# Patient Record
Sex: MALE | Race: WHITE | NOT HISPANIC OR LATINO | Employment: FULL TIME | URBAN - METROPOLITAN AREA
[De-identification: names, ages, dates, MRNs, and addresses within clinical notes are randomized per-mention and may not be internally consistent; named-entity substitution may affect disease eponyms.]

---

## 2017-02-16 ENCOUNTER — OFFICE VISIT (OUTPATIENT)
Dept: FAMILY MEDICINE | Facility: CLINIC | Age: 47
End: 2017-02-16

## 2017-02-16 VITALS
SYSTOLIC BLOOD PRESSURE: 130 MMHG | TEMPERATURE: 97.9 F | DIASTOLIC BLOOD PRESSURE: 81 MMHG | OXYGEN SATURATION: 97 % | WEIGHT: 195 LBS | HEART RATE: 58 BPM | BODY MASS INDEX: 28.18 KG/M2 | RESPIRATION RATE: 20 BRPM

## 2017-02-16 DIAGNOSIS — R05.9 COUGH: Primary | ICD-10-CM

## 2017-02-16 RX ORDER — PREDNISONE 20 MG/1
TABLET ORAL
Qty: 8 TABLET | Refills: 0 | Status: SHIPPED | OUTPATIENT
Start: 2017-02-16 | End: 2017-03-01

## 2017-02-16 NOTE — MR AVS SNAPSHOT
After Visit Summary   2/16/2017    Natalie Ny    MRN: [id number]           Patient Information     Date Of Birth          1970        Visit Information        Provider Department      2/16/2017 4:35 PM Jed Knox MD [facility]        Today's Diagnoses     Cough    -  1       Follow-ups after your visit        Who to contact     Please call your clinic at [phone] to:    Ask questions about your health    Make or cancel appointments    Discuss your medicines    Learn about your test results    Speak to your doctor   If you have compliments or concerns about an experience at your clinic, or if you wish to file a complaint, please contact [facility] Patient Relations at [phone] or email us at [email or web address]         Additional Information About Your Visit        MyChart Information     D[employer] gives you secure access to your electronic health record. If you see a primary care provider, you can also send messages to your care team and make appointments. If you have questions, please call your primary care clinic.  If you do not have a primary care provider, please call [phone] and they will assist you.      Poderopedia is an electronic gateway that provides easy, online access to your medical records. With P[employer], you can request a clinic appointment, read your test results, renew a prescription or communicate with your care team.     To access your existing account, please contact your [facility] or call [phone] for assistance.        Care EveryWhere ID     This is your Care EveryWhere ID. This could be used by other organizations to access your [place] medical records  [id number]        Your Vitals Were     Pulse Temperature Respirations Pulse Oximetry BMI (Body Mass Index)       58 97.9  F (36.6  C) (Oral) 20 97% 28.18 kg/m2        Blood Pressure from Last 3 Encounters:    02/16/17 130/81   10/25/16 135/81   10/19/16 147/84    Weight from Last 3 Encounters:   02/16/17 88.5 kg (195 lb)   10/25/16 89.8 kg (198 lb)   10/19/16 88.5 kg (195 lb)              Today, you had the following     No orders found for display         Today's Medication Changes          These changes are accurate as of: 2/16/17  5:03 PM.  If you have any questions, ask your nurse or doctor.               Start taking these medicines.        Dose/Directions    predniSONE 20 MG tablet   Commonly known as:  DELTASONE   Used for:  Cough        Take 2 po qd   Quantity:  8 tablet   Refills:  0         These medicines have changed or have updated prescriptions.        Dose/Directions    nadolol 20 MG tablet   Commonly known as:  CORGARD   This may have changed:    - when to take this  - reasons to take this   Used for:  Syncope        Dose:  20 mg   Take 1 tablet by mouth daily.   Quantity:  93 tablet   Refills:  3            Where to get your medicines      These medications were sent to [facility] [address] [place] [address] AT [address] [place] [id number]    Hours:  24-hours Phone:  [phone]     predniSONE 20 MG tablet                Primary Care Provider Office Phone # Fax #    Jed Knox [phone]       [facility] [address] [place] [address]        Thank you!     Thank you for choosing [facility]  for your care. Our goal is always to provide you with excellent care. Hearing back from our patients is one way we can continue to improve our services. Please take a few minutes to complete the written survey that you may receive in the mail after your visit with us. Thank you!             Your Updated Medication List - Protect others around you: Learn how to safely use, store and throw away your medicines at www.disposemymeds.org.          This list is accurate as of: 2/16/17   5:03 PM.  Always use your most recent med list.                   Brand Name Dispense Instructions for use    albuterol 108 (90 BASE) MCG/ACT Inhaler    PROAIR HFA/PROVENTIL HFA/VENTOLIN HFA    1 Inhaler    Inhale 2 puffs into the lungs every 6 hours as needed for shortness of breath / dyspnea or wheezing       finasteride 1 MG tablet    PROPECIA    90 tablet    Take 1 tablet (1 mg) by mouth daily       levothyroxine 200 MCG tablet    SYNTHROID/LEVOTHROID    90 tablet    Take 1 tablet daily       nadolol 20 MG tablet    CORGARD    93 tablet    Take 1 tablet by mouth daily.       predniSONE 20 MG tablet    DELTASONE    8 tablet    Take 2 po qd       ZANTAC 150 MG tablet   Generic drug:  ranitidine      Take 150 mg by mouth as needed.

## 2017-02-16 NOTE — NURSING NOTE
Chief Complaint   Patient presents with     Cough     Pt states he has had a cough for 3-4 weeks.      Cathryn Ochoa LPN February 16, 2017 4:40 PM

## 2017-02-16 NOTE — PROGRESS NOTES
SUBJECTIVE:    Pt is a 46 year old male with pmh of     Patient Active Problem List   Diagnosis     Grave's disease     Syncope and collapse       who is here for evaluation of had concerns including Cough.    Here for a cough.    About a mo ago in [place], had fever, runny nose, cough onset. Saw MD there, got Zpak times one, seemed to help.     No ear ache, no GI sx. At first fever and tired, those are gone.    Only remaining sx is cough. No SOB. Over the years, often takes a long time to stop coughing after virus.    Had dtap, flu shot both up to date; discussed could still get pertusis or flu lesser case, did take zpak.    Uses wifes proventil, helps partly/temporarily w/ cough.    Has coughed enough ribs hurt.    Allergies   Allergen Reactions     Amoxicillin Rash     Penicillins Rash           Current Outpatient Prescriptions   Medication Sig Dispense Refill     predniSONE (DELTASONE) 20 MG tablet Take 2 po qd 8 tablet 0     finasteride (PROPECIA) 1 MG tablet Take 1 tablet (1 mg) by mouth daily 90 tablet 3     levothyroxine (SYNTHROID, LEVOTHROID) 200 MCG tablet Take 1 tablet daily 90 tablet 1     albuterol (PROAIR HFA, PROVENTIL HFA, VENTOLIN HFA) 108 (90 BASE) MCG/ACT inhaler Inhale 2 puffs into the lungs every 6 hours as needed for shortness of breath / dyspnea or wheezing 1 Inhaler 0     nadolol (CORGARD) 20 MG tablet Take 1 tablet by mouth daily. (Patient taking differently: Take 20 mg by mouth as needed ) 93 tablet 3     ranitidine (ZANTAC) 150 MG tablet Take 150 mg by mouth as needed.         Social History   Substance Use Topics     Smoking status: Never Smoker     Smokeless tobacco: Never Used     Alcohol use Not on file         OBJECTIVE:  /81  Pulse 58  Temp 97.9  F (36.6  C) (Oral)  Resp 20  Wt 88.5 kg (195 lb)  SpO2 97%  BMI 28.18 kg/m2  GENERAL APPEARANCE: Alert, no acute distress  EYES: PERRL, EOM normal, conjunctiva and lids normal  HENT: Ears and TMs normal, oral mucosa and posterior  oropharynx normal  NECK: No adenopathy,masses or thyromegaly  RESP: lungs clear to auscultation   NEURO: Alert, oriented, speech and mentation normal  PSYCHE: mentation appears normal, affect and mood normal    ASSESSMENT/PLAN:    Cough: post viral vs pertussis (took zpak)  Discussed labs, cxr antoine not helpful  Pred burst, f/u prn, cont prn proventil inhaler    KAITLYNN ABREU MD

## 2017-02-22 DIAGNOSIS — R55 SYNCOPE, UNSPECIFIED SYNCOPE TYPE: Primary | ICD-10-CM

## 2017-02-22 RX ORDER — NADOLOL 20 MG/1
20 TABLET ORAL DAILY
Qty: 93 TABLET | Refills: 3 | Status: SHIPPED | OUTPATIENT
Start: 2017-02-22 | End: 2018-05-14

## 2017-02-24 DIAGNOSIS — R05.9 COUGH: Primary | ICD-10-CM

## 2017-03-01 ENCOUNTER — OFFICE VISIT (OUTPATIENT)
Dept: PULMONOLOGY | Facility: CLINIC | Age: 47
End: 2017-03-01
Attending: INTERNAL MEDICINE
Payer: COMMERCIAL

## 2017-03-01 VITALS — OXYGEN SATURATION: 99 % | SYSTOLIC BLOOD PRESSURE: 138 MMHG | HEART RATE: 64 BPM | DIASTOLIC BLOOD PRESSURE: 75 MMHG

## 2017-03-01 DIAGNOSIS — E03.9 HYPOTHYROIDISM: ICD-10-CM

## 2017-03-01 DIAGNOSIS — R05.9 COUGH: Primary | ICD-10-CM

## 2017-03-01 DIAGNOSIS — R05.9 COUGH: ICD-10-CM

## 2017-03-01 PROCEDURE — 99212 OFFICE O/P EST SF 10 MIN: CPT | Mod: ZF

## 2017-03-01 RX ORDER — FLUTICASONE PROPIONATE 220 UG/1
2 AEROSOL, METERED RESPIRATORY (INHALATION) 2 TIMES DAILY
Qty: 1 INHALER | Refills: 1 | Status: SHIPPED | OUTPATIENT
Start: 2017-03-01 | End: 2017-03-24

## 2017-03-01 RX ORDER — LEVOTHYROXINE SODIUM 200 UG/1
TABLET ORAL
Qty: 90 TABLET | Refills: 2 | Status: SHIPPED | OUTPATIENT
Start: 2017-03-01 | End: 2018-02-15

## 2017-03-01 ASSESSMENT — PAIN SCALES - GENERAL: PAINLEVEL: NO PAIN (0)

## 2017-03-01 NOTE — TELEPHONE ENCOUNTER
levothyroxine  Last Written Prescription Date:  9/6/16  Last Fill Quantity: 90,   # refills: 1  Last Office Visit : 10/25/16  Future Office visit:  No future visit

## 2017-03-01 NOTE — PATIENT INSTRUCTIONS
1. Cough most likely reactive airway disease following pneumonia   2. Flovent HFA 2puffs twice a day for 2 weeks. If better, down to 1puff twice a day   3. F/u in general pulmonary clinic in 4-6weeks   4. CT chest without contrast to r/o other causes   5. Oral rinse before and after inhaler use

## 2017-03-01 NOTE — LETTER
3/1/2017       RE: Natalie Ny  [address]  [place] [address]     Dear Colleague,    Thank you for referring your patient, Natalie Ny, to the [facility] at [facility]. Please see a copy of my visit note below.    [facility]   PULMONARY OUTPATIENT CONSULT NOTE    Natalie Ny MRN# [id number]   Age: 46 year old YOB: 1970     Reason for Consultation:     Chronic cough  Requesting Physician:         Jed Knox MD  [facility]  [address] [place] [address] [address]  [place] [address]       Assessment and Plan:    1. Chronic cough - most likely tracheobronchitis/RAD following bout of PNA. CXR without any airspace opacity. PFT are normal. At this point, it appears his sx are improving, although gradually, and interfering with work. Plan is to prescribe steroid inhaler for RAD (flovent HFA 2p BID) for 2 weeks then taper to 1p BID if sx are improving. Additionally, we will get chest ct w/o contrast to r/o other etiologies for cough. Plan is to see back in clinic in 4-6 weeks to re-evaluate sx. He is planning to get the ct done this week and I will call him with my impression.     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 He saw urgent care and was given z-liam with modest improvement.   - Over the past 2 weeks, his cough is dry and exacerbated by talking. He denies fever or chills. He was prescribed 40mg prednisone for 5 days which had some improvement however sx returned a few days after.   - He denies recollection of aspiration, fever/chills, chest pain or dyspnea.            Past Medical  History:    No past medical history on file.          Past Surgical History:    No past surgical history on file.         Social History:     Social History   Substance Use Topics     Smoking status: Never Smoker     Smokeless tobacco: Never Used     Alcohol use Not on file            Family History:   No family history on file.          Allergies:   Please see allergy list which was reviewed this admission.         Medications:     .  Current Outpatient Prescriptions   Medication     fluticasone (FLOVENT HFA) 220 MCG/ACT Inhaler     albuterol (PROAIR HFA/PROVENTIL HFA/VENTOLIN HFA) 108 (90 BASE) MCG/ACT Inhaler     nadolol (CORGARD) 20 MG tablet     finasteride (PROPECIA) 1 MG tablet     [DISCONTINUED] levothyroxine (SYNTHROID, LEVOTHROID) 200 MCG tablet     ranitidine (ZANTAC) 150 MG tablet     levothyroxine (SYNTHROID/LEVOTHROID) 200 MCG tablet     No current facility-administered medications for this visit.           Review of Systems:   CONSTITUTIONAL: negative for fever, chills, change in weight  INTEGUMENTARY/SKIN: no rash or obvious new lesions  ENT/MOUTH: no sore throat, new sinus pain or nasal drainage  RESP: see interval history  CV: negative for chest pain, palpitations or peripheral edema  GI: no nausea, vomiting, change in stools  : no dysuria  MUSCULOSKELETAL: no myalgias, arthralgias  ENDOCRINE: blood sugars with adequate control  PSYCHIATRIC: mood stable  LYMPHATIC: no new lymphadenopathy  HEME: no bleeding or easy bruisability  NEURO: no numbness, weakness, headaches         Physical Exam:   Pulse:  [64] 64  BP: (138)/(75) 138/75  SpO2:  [99 %] 99 %    Wt Readings from Last 4 Encounters:   02/16/17 88.5 kg (195 lb)   10/25/16 89.8 kg (198 lb)   10/19/16 88.5 kg (195 lb)   03/03/16 86.8 kg (191 lb 4.8 oz)       Constitutional:   Awake, alert and in no apparent distress     Eyes:   Nonicteric, CHATO     ENT:    oral mucosa moist without lesions     Neck:   Supple without supraclavicular or cervical  lymphadenopathy     Lungs:   Good air flow.  No crackles. No rhonchi.  No wheezes.     Cardiovascular:   Normal S1 and S2.  RRR.  No murmur, gallop or rub.  Radial, DP and PT pulses normal and symmetric     Abdomen:   NABS, soft, nontender, nondistended.  No HSM.     Musculoskeletal:   No edema. Strength 5/5 and symmetric     Neurologic:   Alert and conversant. Cranial nerves II-XII intact.       Skin:   Warm, dry.  No rash on limited exam.           Current Laboratory Data:   All laboratory and imaging data reviewed.    Results for orders placed or performed in visit on 03/01/17 (from the past 24 hour(s))   PFT General Lab Testing   Result Value Ref Range    FVC-Pred 5.12 L    FVC-Pre 5.10 L    FVC-%Pred-Pre 99 %    FEV1-Pre 3.87 L    FEV1-%Pred-Pre 95 %    FEV1FVC-Pred 80 %    FEV1FVC-Pre 76 %    FEFMax-Pred 10.01 L/sec    FEFMax-Pre 12.29 L/sec    FEFMax-%Pred-Pre 122 %    FEF2575-Pred 3.83 L/sec    FEF2575-Pre 3.01 L/sec    RGM5564-%Pred-Pre 78 %    FEF2575-Post 4.13 L/sec    HPA7220-%Pred-Post 107 %    ExpTime-Pre 6.67 sec    FIFMax-Pre 9.14 L/sec    VC-Pred 5.12 L    VC-Pre 5.48 L    VC-%Pred-Pre 107 %    IC-Pred 3.95 L    IC-Pre 4.41 L    IC-%Pred-Pre 111 %    ERV-Pred 1.17 L    ERV-Pre 1.07 L    ERV-%Pred-Pre 91 %    FEV1FEV6-Pred 81 %    FEV1FEV6-Pre 76 %    FRCPleth-Pred 3.48 L    FRCPleth-Pre 3.82 L    FRCPleth-%Pred-Pre 109 %    RVPleth-Pred 2.10 L    RVPleth-Pre 2.75 L    RVPleth-%Pred-Pre 131 %    TLCPleth-Pred 7.13 L    TLCPleth-Pre 8.23 L    TLCPleth-%Pred-Pre 115 %    DLCOunc-Pred 31.74 ml/min/mmHg    DLCOunc-Pre 38.42 ml/min/mmHg    DLCOunc-%Pred-Pre 121 %    VA-Pre 7.24 L    VA-%Pred-Pre 106 %    FEV1SVC-Pred 79 %    FEV1SVC-Pre 71 %          Previous Pulmonary Function Testing     FVC-Pred   Date Value Ref Range Status   03/01/2017 5.12 L      FVC-Pre   Date Value Ref Range Status   03/01/2017 5.10 L      FVC-%Pred-Pre   Date Value Ref Range Status   03/01/2017 99 %      FEV1-Pre   Date Value Ref  Range Status   03/01/2017 3.87 L      FEV1-%Pred-Pre   Date Value Ref Range Status   03/01/2017 95 %      FEV1FVC-Pred   Date Value Ref Range Status   03/01/2017 80 %      FEV1FVC-Pre   Date Value Ref Range Status   03/01/2017 76 %      No results found for: 987[address]  FEFMax-Pred   Date Value Ref Range Status   03/01/2017 10.01 L/sec      FEFMax-Pre   Date Value Ref Range Status   03/01/2017 12.29 L/sec      FEFMax-%Pred-Pre   Date Value Ref Range Status   03/01/2017 122 %      ExpTime-Pre   Date Value Ref Range Status   03/01/2017 6.67 sec      FIFMax-Pre   Date Value Ref Range Status   03/01/2017 9.14 L/sec      FEV1FEV6-Pred   Date Value Ref Range Status   03/01/2017 81 %      FEV1FEV6-Pre   Date Value Ref Range Status   03/01/2017 76 %            Previous Chest Imaging   Exam: XR CHEST 2 VW, 2/24/2017 2:46 PM     Indication: Cough     Comparison: None     Findings:   PA and lateral view of the chest. No pleural effusion. No  pneumothorax. Cardiac silhouette and pulmonary vasculature is within  normal limits. Visualized osseous structures and upper abdomen are  unremarkable.         Impression: No acute airspace disease.     I have personally reviewed the examination and initial interpretation  and I agree with the findings.     SCOOBY ANN MD         Again, thank you for allowing me to participate in the care of your patient.      Sincerely,    Pulmonary Consult

## 2017-03-01 NOTE — MR AVS SNAPSHOT
After Visit Summary   3/1/2017    Natalie Ny    MRN: [id number]           Patient Information     Date Of Birth          1970        Visit Information        Provider Department      3/1/2017 2:00 PM [facility] PULMONARY CONSULT [facility]        Today's Diagnoses     Cough    -  1      Care Instructions    1. Cough most likely reactive airway disease following pneumonia   2. Flovent HFA 2puffs twice a day for 2 weeks. If better, down to 1puff twice a day   3. F/u in general pulmonary clinic in 4-6weeks   4. CT chest without contrast to r/o other causes   5. Oral rinse before and after inhaler use             Follow-ups after your visit        Future tests that were ordered for you today     Open Future Orders        Priority Expected Expires Ordered    CT Chest w/o Contrast Routine 3/1/2018 3/1/2019 3/1/2017            Who to contact     If you have questions or need follow up information about today's clinic visit or your schedule please contact [facility] [employer] directly at [phone].  Normal or non-critical lab and imaging results will be communicated to you by Playbooxhart, letter or phone within 4 business days after the clinic has received the results. If you do not hear from us within 7 days, please contact the clinic through [employer] or phone. If you have a critical or abnormal lab result, we will notify you by phone as soon as possible.  Submit refill requests through [employer] or call your pharmacy and they will forward the refill request to us. Please allow 3 business days for your refill to be completed.          Additional Information About Your Visit        Playbooxhart Information     [employer] gives you secure access to your electronic health record. If you see a primary care provider, you can also send messages to your care team and make appointments. If you have questions, please call your primary care clinic.  If you do not have a  primary care provider, please call [phone] and they will assist you.        Care EveryWhere ID     This is your Care EveryWhere ID. This could be used by other organizations to access your [place] medical records  [id number]        Your Vitals Were     Pulse Pulse Oximetry                64 99%           Blood Pressure from Last 3 Encounters:   03/01/17 138/75   02/16/17 130/81   10/25/16 135/81    Weight from Last 3 Encounters:   02/16/17 88.5 kg (195 lb)   10/25/16 89.8 kg (198 lb)   10/19/16 88.5 kg (195 lb)                 Today's Medication Changes          These changes are accurate as of: 3/1/17  2:24 PM.  If you have any questions, ask your nurse or doctor.               Start taking these medicines.        Dose/Directions    fluticasone 220 MCG/ACT Inhaler   Commonly known as:  FLOVENT HFA   Used for:  Cough        Dose:  2 puff   Inhale 2 puffs into the lungs 2 times daily   Quantity:  1 Inhaler   Refills:  1         These medicines have changed or have updated prescriptions.        Dose/Directions    nadolol 20 MG tablet   Commonly known as:  CORGARD   This may have changed:    - when to take this  - reasons to take this   Used for:  Syncope, unspecified syncope type        Dose:  20 mg   Take 1 tablet (20 mg) by mouth daily   Quantity:  93 tablet   Refills:  3            Where to get your medicines      These medications were sent to [facility] [address] [place] [address] AT [address] [place] [id number]    Hours:  24-hours Phone:  [phone]     fluticasone 220 MCG/ACT Inhaler                Primary Care Provider Office Phone # Fax #    Jed Knox [phone]       [facility] [address] [place] [address]        Thank you!     Thank you for choosing [facility]  for your care. Our goal is always to provide you with excellent care. Hearing back  from our patients is one way we can continue to improve our services. Please take a few minutes to complete the written survey that you may receive in the mail after your visit with us. Thank you!             Your Updated Medication List - Protect others around you: Learn how to safely use, store and throw away your medicines at www.disposemymeds.org.          This list is accurate as of: 3/1/17  2:24 PM.  Always use your most recent med list.                   Brand Name Dispense Instructions for use    albuterol 108 (90 BASE) MCG/ACT Inhaler    PROAIR HFA/PROVENTIL HFA/VENTOLIN HFA    1 Inhaler    Inhale 2 puffs into the lungs every 6 hours as needed for shortness of breath / dyspnea or wheezing       finasteride 1 MG tablet    PROPECIA    90 tablet    Take 1 tablet (1 mg) by mouth daily       fluticasone 220 MCG/ACT Inhaler    FLOVENT HFA    1 Inhaler    Inhale 2 puffs into the lungs 2 times daily       levothyroxine 200 MCG tablet    SYNTHROID/LEVOTHROID    90 tablet    Take 1 tablet daily       nadolol 20 MG tablet    CORGARD    93 tablet    Take 1 tablet (20 mg) by mouth daily       ZANTAC 150 MG tablet   Generic drug:  ranitidine      Take 150 mg by mouth as needed.

## 2017-03-01 NOTE — PROGRESS NOTES
[facility]   PULMONARY OUTPATIENT CONSULT NOTE    Natalie Ny MRN# [id number]   Age: 46 year old YOB: 1970     Reason for Consultation:     Chronic cough  Requesting Physician:         Jed Knox MD  [facility]  [address] [place] [id number]       Assessment and Plan:    1. Chronic cough - most likely tracheobronchitis/RAD following bout of PNA. CXR without any airspace opacity. PFT are normal. At this point, it appears his sx are improving, although gradually, and interfering with work. Plan is to prescribe steroid inhaler for RAD (flovent HFA 2p BID) for 2 weeks then taper to 1p BID if sx are improving. Additionally, we will get chest ct w/o contrast to r/o other etiologies for cough. Plan is to see back in clinic in 4-6 weeks to re-evaluate sx. He is planning to get the ct done this week and I will call him with my impression.     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 He saw urgent care and was given z-liam with modest improvement.   - Over the past 2 weeks, his cough is dry and exacerbated by talking. He denies fever or chills. He was prescribed 40mg prednisone for 5 days which had some improvement however sx returned a few days after.   - He denies recollection of aspiration, fever/chills, chest pain or dyspnea.            Past Medical History:    No past medical history on file.          Past Surgical History:    No past surgical history on file.         Social History:     Social History   Substance Use Topics     Smoking status: Never Smoker     Smokeless tobacco: Never Used     Alcohol use Not on file            Family History:   No family history  on file.          Allergies:   Please see allergy list which was reviewed this admission.         Medications:     .  Current Outpatient Prescriptions   Medication     fluticasone (FLOVENT HFA) 220 MCG/ACT Inhaler     albuterol (PROAIR HFA/PROVENTIL HFA/VENTOLIN HFA) 108 (90 BASE) MCG/ACT Inhaler     nadolol (CORGARD) 20 MG tablet     finasteride (PROPECIA) 1 MG tablet     [DISCONTINUED] levothyroxine (SYNTHROID, LEVOTHROID) 200 MCG tablet     ranitidine (ZANTAC) 150 MG tablet     levothyroxine (SYNTHROID/LEVOTHROID) 200 MCG tablet     No current facility-administered medications for this visit.           Review of Systems:   CONSTITUTIONAL: negative for fever, chills, change in weight  INTEGUMENTARY/SKIN: no rash or obvious new lesions  ENT/MOUTH: no sore throat, new sinus pain or nasal drainage  RESP: see interval history  CV: negative for chest pain, palpitations or peripheral edema  GI: no nausea, vomiting, change in stools  : no dysuria  MUSCULOSKELETAL: no myalgias, arthralgias  ENDOCRINE: blood sugars with adequate control  PSYCHIATRIC: mood stable  LYMPHATIC: no new lymphadenopathy  HEME: no bleeding or easy bruisability  NEURO: no numbness, weakness, headaches         Physical Exam:   Pulse:  [64] 64  BP: (138)/(75) 138/75  SpO2:  [99 %] 99 %    Wt Readings from Last 4 Encounters:   02/16/17 88.5 kg (195 lb)   10/25/16 89.8 kg (198 lb)   10/19/16 88.5 kg (195 lb)   03/03/16 86.8 kg (191 lb 4.8 oz)       Constitutional:   Awake, alert and in no apparent distress     Eyes:   Nonicteric, CHATO     ENT:    oral mucosa moist without lesions     Neck:   Supple without supraclavicular or cervical lymphadenopathy     Lungs:   Good air flow.  No crackles. No rhonchi.  No wheezes.     Cardiovascular:   Normal S1 and S2.  RRR.  No murmur, gallop or rub.  Radial, DP and PT pulses normal and symmetric     Abdomen:   NABS, soft, nontender, nondistended.  No HSM.     Musculoskeletal:   No edema. Strength 5/5 and  symmetric     Neurologic:   Alert and conversant. Cranial nerves II-XII intact.       Skin:   Warm, dry.  No rash on limited exam.           Current Laboratory Data:   All laboratory and imaging data reviewed.    Results for orders placed or performed in visit on 03/01/17 (from the past 24 hour(s))   PFT General Lab Testing   Result Value Ref Range    FVC-Pred 5.12 L    FVC-Pre 5.10 L    FVC-%Pred-Pre 99 %    FEV1-Pre 3.87 L    FEV1-%Pred-Pre 95 %    FEV1FVC-Pred 80 %    FEV1FVC-Pre 76 %    FEFMax-Pred 10.01 L/sec    FEFMax-Pre 12.29 L/sec    FEFMax-%Pred-Pre 122 %    FEF2575-Pred 3.83 L/sec    FEF2575-Pre 3.01 L/sec    LCR5737-%Pred-Pre 78 %    FEF2575-Post 4.13 L/sec    CIC0654-%Pred-Post 107 %    ExpTime-Pre 6.67 sec    FIFMax-Pre 9.14 L/sec    VC-Pred 5.12 L    VC-Pre 5.48 L    VC-%Pred-Pre 107 %    IC-Pred 3.95 L    IC-Pre 4.41 L    IC-%Pred-Pre 111 %    ERV-Pred 1.17 L    ERV-Pre 1.07 L    ERV-%Pred-Pre 91 %    FEV1FEV6-Pred 81 %    FEV1FEV6-Pre 76 %    FRCPleth-Pred 3.48 L    FRCPleth-Pre 3.82 L    FRCPleth-%Pred-Pre 109 %    RVPleth-Pred 2.10 L    RVPleth-Pre 2.75 L    RVPleth-%Pred-Pre 131 %    TLCPleth-Pred 7.13 L    TLCPleth-Pre 8.23 L    TLCPleth-%Pred-Pre 115 %    DLCOunc-Pred 31.74 ml/min/mmHg    DLCOunc-Pre 38.42 ml/min/mmHg    DLCOunc-%Pred-Pre 121 %    VA-Pre 7.24 L    VA-%Pred-Pre 106 %    FEV1SVC-Pred 79 %    FEV1SVC-Pre 71 %          Previous Pulmonary Function Testing     FVC-Pred   Date Value Ref Range Status   03/01/2017 5.12 L      FVC-Pre   Date Value Ref Range Status   03/01/2017 5.10 L      FVC-%Pred-Pre   Date Value Ref Range Status   03/01/2017 99 %      FEV1-Pre   Date Value Ref Range Status   03/01/2017 3.87 L      FEV1-%Pred-Pre   Date Value Ref Range Status   03/01/2017 95 %      FEV1FVC-Pred   Date Value Ref Range Status   03/01/2017 80 %      FEV1FVC-Pre   Date Value Ref Range Status   03/01/2017 76 %      No results found for: 20029  FEFMax-Pred   Date Value Ref Range Status    03/01/2017 10.01 L/sec      FEFMax-Pre   Date Value Ref Range Status   03/01/2017 12.29 L/sec      FEFMax-%Pred-Pre   Date Value Ref Range Status   03/01/2017 122 %      ExpTime-Pre   Date Value Ref Range Status   03/01/2017 6.67 sec      FIFMax-Pre   Date Value Ref Range Status   03/01/2017 9.14 L/sec      FEV1FEV6-Pred   Date Value Ref Range Status   03/01/2017 81 %      FEV1FEV6-Pre   Date Value Ref Range Status   03/01/2017 76 %            Previous Chest Imaging   Exam: XR CHEST 2 VW, 2/24/2017 2:46 PM     Indication: Cough     Comparison: None     Findings:   PA and lateral view of the chest. No pleural effusion. No  pneumothorax. Cardiac silhouette and pulmonary vasculature is within  normal limits. Visualized osseous structures and upper abdomen are  unremarkable.         Impression: No acute airspace disease.     I have personally reviewed the examination and initial interpretation  and I agree with the findings.     SCOOBY ANN MD

## 2017-03-01 NOTE — NURSING NOTE
Chief Complaint   Patient presents with     Breathing Problem     Patient is being seen for follow up      Lakesha Juárez CMA at 4:45 PM on 3/1/2017

## 2017-03-15 LAB
DLCOUNC-%PRED-PRE: 121 %
DLCOUNC-PRE: 38.42 ML/MIN/MMHG
DLCOUNC-PRED: 31.74 ML/MIN/MMHG
ERV-%PRED-PRE: 91 %
ERV-PRE: 1.07 L
ERV-PRED: 1.17 L
EXPTIME-PRE: 6.67 SEC
FEF2575-%PRED-POST: 107 %
FEF2575-%PRED-PRE: 78 %
FEF2575-POST: 4.13 L/SEC
FEF2575-PRE: 3.01 L/SEC
FEF2575-PRED: 3.83 L/SEC
FEFMAX-%PRED-PRE: 122 %
FEFMAX-PRE: 12.29 L/SEC
FEFMAX-PRED: 10.01 L/SEC
FEV1-%PRED-PRE: 95 %
FEV1-PRE: 3.87 L
FEV1FEV6-PRE: 76 %
FEV1FEV6-PRED: 81 %
FEV1FVC-PRE: 76 %
FEV1FVC-PRED: 80 %
FEV1SVC-PRE: 71 %
FEV1SVC-PRED: 79 %
FIFMAX-PRE: 9.14 L/SEC
FRCPLETH-%PRED-PRE: 109 %
FRCPLETH-PRE: 3.82 L
FRCPLETH-PRED: 3.48 L
FVC-%PRED-PRE: 99 %
FVC-PRE: 5.1 L
FVC-PRED: 5.12 L
IC-%PRED-PRE: 111 %
IC-PRE: 4.41 L
IC-PRED: 3.95 L
RVPLETH-%PRED-PRE: 131 %
RVPLETH-PRE: 2.75 L
RVPLETH-PRED: 2.1 L
TLCPLETH-%PRED-PRE: 115 %
TLCPLETH-PRE: 8.23 L
TLCPLETH-PRED: 7.13 L
VA-%PRED-PRE: 106 %
VA-PRE: 7.24 L
VC-%PRED-PRE: 107 %
VC-PRE: 5.48 L
VC-PRED: 5.12 L

## 2017-03-24 ENCOUNTER — OFFICE VISIT (OUTPATIENT)
Dept: PULMONOLOGY | Facility: CLINIC | Age: 47
End: 2017-03-24
Attending: INTERNAL MEDICINE
Payer: COMMERCIAL

## 2017-03-24 VITALS
RESPIRATION RATE: 16 BRPM | HEART RATE: 58 BPM | OXYGEN SATURATION: 97 % | DIASTOLIC BLOOD PRESSURE: 73 MMHG | SYSTOLIC BLOOD PRESSURE: 118 MMHG

## 2017-03-24 DIAGNOSIS — R91.8 PULMONARY NODULES: Primary | ICD-10-CM

## 2017-03-24 DIAGNOSIS — R05.9 COUGH: ICD-10-CM

## 2017-03-24 PROCEDURE — 99212 OFFICE O/P EST SF 10 MIN: CPT | Mod: ZF

## 2017-03-24 RX ORDER — FLUTICASONE PROPIONATE 220 UG/1
2 AEROSOL, METERED RESPIRATORY (INHALATION) 2 TIMES DAILY
Qty: 1 INHALER | Refills: 3 | Status: SHIPPED | OUTPATIENT
Start: 2017-03-24 | End: 2020-10-19

## 2017-03-24 ASSESSMENT — PAIN SCALES - GENERAL: PAINLEVEL: NO PAIN (0)

## 2017-03-24 NOTE — LETTER
3/24/2017     RE: Natalie Ny  [address]  [facility] [id number]     Dear Colleague,    Thank you for referring your patient, Natalie Ny, to the [facility] at [facility]. Please see a copy of my visit note below.    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    ## Prolonged Cough  ## Reactive airways  Most likely tracheobronchitis/RAD following bout infectious bronchitis. Has a history of prolonged cough following infections. Normal imaging and PFT's. Symptoms improving, though not resolved yet.     - Continue flovent, titrate off as able   - Refilled today  - Resume flovent if infections occur in the future in an attempt to reduce this prolonged sequelae      ## Pulmonary nodules  Multiple sub 8mm nodules, incidentally noted on CT for evaluation of cough. Low risk patient.   - Repeat CT one year following initial CT (ordered for March 2018)        RTC: PRN      Patient seen and discussed with Dr. Lizandro Nolen M.D.  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    Attended conference in [place] in January and developed productive cough and fever. He saw urgent care and was given z-liam with modest improvement.     Since he was last seen his cough has substantially, but not completely improved. He was started on flovent 2puffs BID early in the course. Since then he has decreased  this to 1 puff QD to BID, with frequent missed days but despite this his symptoms have continued to resolve.   He has occasional mild cough but no dyspnea. He continues to swim laps without dyspnea or wheeze, though coughing does make swimming difficult on occasion.    Otherwise asymptomatic without fevers or malaise.    Intermittent mild post nasal drip which responds to nasal rinses and zyrtec. Also has mild GERD treated with ranitidine.               Review of Systems:     Review of Systems   Constitutional: Negative for chills and fever.   HENT: Negative for congestion.         Intermittent post nasal drip   Eyes: Negative.    Respiratory: Positive for cough. Negative for hemoptysis and shortness of breath.    Cardiovascular: Negative for chest pain.   Gastrointestinal:        Intermittent GERD well controlled with pantoprazole   Genitourinary: Negative.    Musculoskeletal: Negative.    Skin: Negative.    Neurological: Negative.    Endo/Heme/Allergies: Negative.             Physical Examination:     /73 (BP Location: Left arm, Patient Position: Chair, Cuff Size: Adult Regular)  Pulse 58  Resp 16  SpO2 97%    Physical Exam   Constitutional: He is oriented to person, place, and time and well-developed, well-nourished, and in no distress. No distress.   HENT:   Head: Normocephalic and atraumatic.   Mouth/Throat: Oropharynx is clear and moist. No oropharyngeal exudate.   Eyes: Conjunctivae and EOM are normal. Pupils are equal, round, and reactive to light. No scleral icterus.   Neck: Normal range of motion.   Cardiovascular: Normal rate and regular rhythm.    No murmur heard.  Pulmonary/Chest: Effort normal and breath sounds normal.   Lymphadenopathy:     He has no cervical adenopathy.   Neurological: He is alert and oriented to person, place, and time.   Skin: Skin is warm and dry.   Psychiatric: Affect and judgment normal.   Nursing note and vitals reviewed.          Data:     PFTs reviewed           Medications:     Current Outpatient Prescriptions   Medication     fluticasone (FLOVENT HFA) 220 MCG/ACT Inhaler     levothyroxine (SYNTHROID/LEVOTHROID) 200 MCG tablet     albuterol (PROAIR HFA/PROVENTIL HFA/VENTOLIN HFA) 108 (90 BASE) MCG/ACT Inhaler     nadolol (CORGARD) 20 MG tablet     finasteride (PROPECIA) 1 MG tablet     ranitidine (ZANTAC) 150 MG tablet     No current facility-administered medications for this visit.      Attending statement:    The patient was seen and examined by me with the pulmonary fellow, Dr Nolen.  The case was discussed at length.  Vitals, lab results and imaging from our visit were reviewed.  The note written by Dr Nolen above reflects our joint assessment and plan.    Govind Bone MD                          Again, thank you for allowing me to participate in the care of your patient.      Sincerely,    Cristian Nolen MD

## 2017-03-24 NOTE — PROGRESS NOTES
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    ## Prolonged Cough  ## Reactive airways  Most likely tracheobronchitis/RAD following bout infectious bronchitis. Has a history of prolonged cough following infections. Normal imaging and PFT's. Symptoms improving, though not resolved yet.     - Continue flovent, titrate off as able   - Refilled today  - Resume flovent if infections occur in the future in an attempt to reduce this prolonged sequelae      ## Pulmonary nodules  Multiple sub 8mm nodules, incidentally noted on CT for evaluation of cough. Low risk patient.   - Repeat CT one year following initial CT (ordered for March 2018)        RTC: PRN      Patient seen and discussed with Dr. Lizandro Nolen M.D.  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    Attended conference in [place] in January and developed productive cough and fever. He saw urgent care and was given z-liam with modest improvement.     Since he was last seen his cough has substantially, but not completely improved. He was started on flovent 2puffs BID early in the course. Since then he has decreased this to 1 puff QD to BID, with frequent missed days but despite this his symptoms have continued to resolve.   He has occasional mild cough but no dyspnea. He continues to swim laps without dyspnea or wheeze, though coughing does make swimming difficult on occasion.    Otherwise asymptomatic without fevers or  malaise.    Intermittent mild post nasal drip which responds to nasal rinses and zyrtec. Also has mild GERD treated with ranitidine.               Review of Systems:     Review of Systems   Constitutional: Negative for chills and fever.   HENT: Negative for congestion.         Intermittent post nasal drip   Eyes: Negative.    Respiratory: Positive for cough. Negative for hemoptysis and shortness of breath.    Cardiovascular: Negative for chest pain.   Gastrointestinal:        Intermittent GERD well controlled with pantoprazole   Genitourinary: Negative.    Musculoskeletal: Negative.    Skin: Negative.    Neurological: Negative.    Endo/Heme/Allergies: Negative.             Physical Examination:     /73 (BP Location: Left arm, Patient Position: Chair, Cuff Size: Adult Regular)  Pulse 58  Resp 16  SpO2 97%    Physical Exam   Constitutional: He is oriented to person, place, and time and well-developed, well-nourished, and in no distress. No distress.   HENT:   Head: Normocephalic and atraumatic.   Mouth/Throat: Oropharynx is clear and moist. No oropharyngeal exudate.   Eyes: Conjunctivae and EOM are normal. Pupils are equal, round, and reactive to light. No scleral icterus.   Neck: Normal range of motion.   Cardiovascular: Normal rate and regular rhythm.    No murmur heard.  Pulmonary/Chest: Effort normal and breath sounds normal.   Lymphadenopathy:     He has no cervical adenopathy.   Neurological: He is alert and oriented to person, place, and time.   Skin: Skin is warm and dry.   Psychiatric: Affect and judgment normal.   Nursing note and vitals reviewed.          Data:     PFTs reviewed          Medications:     Current Outpatient Prescriptions   Medication     fluticasone (FLOVENT HFA) 220 MCG/ACT Inhaler     levothyroxine (SYNTHROID/LEVOTHROID) 200 MCG tablet     albuterol (PROAIR HFA/PROVENTIL HFA/VENTOLIN HFA) 108 (90 BASE) MCG/ACT Inhaler     nadolol (CORGARD) 20 MG tablet     finasteride (PROPECIA) 1  MG tablet     ranitidine (ZANTAC) 150 MG tablet     No current facility-administered medications for this visit.          Attending statement:    The patient was seen and examined by me with the pulmonary fellow, Dr Nolen.  The case was discussed at length.  Vitals, lab results and imaging from our visit were reviewed.  The note written by Dr Nolen above reflects our joint assessment and plan.    Govind Bone MD

## 2017-03-24 NOTE — NURSING NOTE
Chief Complaint   Patient presents with     Breathing Problem     Patient is being seen for follow up of breathing issues      Lakesha Juárez CMA at 4:53 PM on 3/24/2017

## 2017-03-24 NOTE — MR AVS SNAPSHOT
After Visit Summary   3/24/2017    Natalie Ny    MRN: [id number]           Patient Information     Date Of Birth          1970        Visit Information        Provider Department      3/24/2017 4:30 PM Cristian Nolen MD [facility]        Today's Diagnoses     Pulmonary nodules    -  1    Cough           Follow-ups after your visit        Follow-up notes from your care team     Return if symptoms worsen or fail to improve.      Future tests that were ordered for you today     Open Future Orders        Priority Expected Expires Ordered    CT Chest w/o contrast Routine 3/1/2018 3/24/2018 3/24/2017            Who to contact     If you have questions or need follow up information about today's clinic visit or your schedule please contact [facility] directly at [phone].  Normal or non-critical lab and imaging results will be communicated to you by [employer], letter or phone within 4 business days after the clinic has received the results. If you do not hear from us within 7 days, please contact the clinic through [employer] or phone. If you have a critical or abnormal lab result, we will notify you by phone as soon as possible.  Submit refill requests through [employer] or call your pharmacy and they will forward the refill request to us. Please allow 3 business days for your refill to be completed.          Additional Information About Your Visit        MyChart Information     [employer] gives you secure access to your electronic health record. If you see a primary care provider, you can also send messages to your care team and make appointments. If you have questions, please call your primary care clinic.  If you do not have a primary care provider, please call [phone] and they will assist you.        Care EveryWhere ID     This is your Care EveryWhere ID. This could be used by other organizations to access your [place]  medical records  [id number]        Your Vitals Were     Pulse Respirations Pulse Oximetry             58 16 97%          Blood Pressure from Last 3 Encounters:   03/24/17 118/73   03/01/17 138/75   02/16/17 130/81    Weight from Last 3 Encounters:   02/16/17 88.5 kg (195 lb)   10/25/16 89.8 kg (198 lb)   10/19/16 88.5 kg (195 lb)                 Today's Medication Changes          These changes are accurate as of: 3/24/17  5:02 PM.  If you have any questions, ask your nurse or doctor.               These medicines have changed or have updated prescriptions.        Dose/Directions    nadolol 20 MG tablet   Commonly known as:  CORGARD   This may have changed:    - when to take this  - reasons to take this   Used for:  Syncope, unspecified syncope type        Dose:  20 mg   Take 1 tablet (20 mg) by mouth daily   Quantity:  93 tablet   Refills:  3            Where to get your medicines      Some of these will need a paper prescription and others can be bought over the counter.  Ask your nurse if you have questions.     Bring a paper prescription for each of these medications     fluticasone 220 MCG/ACT Inhaler                Primary Care Provider Office Phone # Fax #    Jed Knox [phone]       [facility] [address] [place] [id number]        Thank you!     Thank you for choosing [facility]  for your care. Our goal is always to provide you with excellent care. Hearing back from our patients is one way we can continue to improve our services. Please take a few minutes to complete the written survey that you may receive in the mail after your visit with us. Thank you!             Your Updated Medication List - Protect others around you: Learn how to safely use, store and throw away your medicines at www.disposemymeds.org.          This list is accurate as of: 3/24/17  5:02 PM.  Always use your most recent med list.                    Brand Name Dispense Instructions for use    albuterol 108 (90 BASE) MCG/ACT Inhaler    PROAIR HFA/PROVENTIL HFA/VENTOLIN HFA    1 Inhaler    Inhale 2 puffs into the lungs every 6 hours as needed for shortness of breath / dyspnea or wheezing       finasteride 1 MG tablet    PROPECIA    90 tablet    Take 1 tablet (1 mg) by mouth daily       fluticasone 220 MCG/ACT Inhaler    FLOVENT HFA    1 Inhaler    Inhale 2 puffs into the lungs 2 times daily       levothyroxine 200 MCG tablet    SYNTHROID/LEVOTHROID    90 tablet    Take 1 tablet daily       nadolol 20 MG tablet    CORGARD    93 tablet    Take 1 tablet (20 mg) by mouth daily       ZANTAC 150 MG tablet   Generic drug:  ranitidine      Take 150 mg by mouth as needed.

## 2017-03-26 ASSESSMENT — ENCOUNTER SYMPTOMS
EYES NEGATIVE: 1
FEVER: 0
MUSCULOSKELETAL NEGATIVE: 1
NEUROLOGICAL NEGATIVE: 1
COUGH: 1
SHORTNESS OF BREATH: 0
CHILLS: 0
HEMOPTYSIS: 0

## 2017-10-16 DIAGNOSIS — E05.00 GRAVES DISEASE: ICD-10-CM

## 2017-10-16 LAB
T4 FREE SERPL-MCNC: 0.94 NG/DL (ref 0.76–1.46)
TSH SERPL DL<=0.005 MIU/L-ACNC: 31.86 MU/L (ref 0.4–4)

## 2017-11-18 DIAGNOSIS — E03.4 HYPOTHYROIDISM DUE TO ACQUIRED ATROPHY OF THYROID: Primary | ICD-10-CM

## 2017-12-04 DIAGNOSIS — R05.9 COUGH: Primary | ICD-10-CM

## 2017-12-07 ENCOUNTER — OFFICE VISIT (OUTPATIENT)
Dept: PULMONOLOGY | Facility: CLINIC | Age: 47
End: 2017-12-07
Payer: COMMERCIAL

## 2017-12-07 VITALS
SYSTOLIC BLOOD PRESSURE: 127 MMHG | OXYGEN SATURATION: 94 % | RESPIRATION RATE: 16 BRPM | BODY MASS INDEX: 28.02 KG/M2 | DIASTOLIC BLOOD PRESSURE: 73 MMHG | HEART RATE: 85 BPM | WEIGHT: 195.7 LBS | HEIGHT: 70 IN

## 2017-12-07 DIAGNOSIS — R05.8 POST-VIRAL COUGH SYNDROME: Primary | ICD-10-CM

## 2017-12-07 DIAGNOSIS — Z87.19 HISTORY OF GASTROESOPHAGEAL REFLUX (GERD): ICD-10-CM

## 2017-12-07 DIAGNOSIS — R05.9 COUGH: ICD-10-CM

## 2017-12-07 DIAGNOSIS — R91.8 PULMONARY NODULES: ICD-10-CM

## 2017-12-07 PROCEDURE — 99212 OFFICE O/P EST SF 10 MIN: CPT | Mod: ZF

## 2017-12-07 ASSESSMENT — PAIN SCALES - GENERAL: PAINLEVEL: NO PAIN (0)

## 2017-12-07 NOTE — PROGRESS NOTES
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     He was last seen in the clinic in March 2017 for prolonged cough after respiratory infection in January. The cough was thought to be from reactive airways secondary to URI. Since March, the symptoms seem to improve. However, he had another URI in September and the cough has started again. It is a non-productive cough, better in the morning and worsens in the afternoon. There is no difference of cough between workplace and home. He initially tried Flovent but his cough got worse so he stopped using it. There is no definite trigger of his cough that he can identify except nadolol which he needs to take it before performing the surgery to reduce his tremor. He denied fever, chills, SOB, rash or postnasal drip. He reports occasional chest tightness. He tolerates exercise well without symptoms. His GERD has worsens recently. However, his cough does not get worse after waking up.    He does not smoke and has no pets.    Review Of Systems  Skin: negative  Eyes: negative  Ears/Nose/Throat: positive for dry nose, negative for sinusitis symptoms  Respiratory: per HPI  Cardiovascular: negative  Gastrointestinal: positive for GERD  Genitourinary: negative  Musculoskeletal: negative  Neurologic: negative  Psychiatric: negative  Hematologic/Lymphatic/Immunologic: negative  Endocrine: negative    Current Outpatient Prescriptions   Medication     levothyroxine (SYNTHROID/LEVOTHROID) 200 MCG tablet     finasteride (PROPECIA) 1 MG tablet     ranitidine (ZANTAC) 150 MG tablet     fluticasone (FLOVENT HFA) 220 MCG/ACT Inhaler     albuterol (PROAIR HFA/PROVENTIL HFA/VENTOLIN HFA) 108 (90 BASE) MCG/ACT Inhaler     nadolol (CORGARD) 20 MG tablet     No current facility-administered medications for this visit.      Physical Exam    Constitutional: He is oriented to person, place, and time and well-developed, well-nourished, and in no distress. No distress.   HENT:   Head: Normocephalic and atraumatic.   Right Ear: External ear normal.   Left Ear: External ear normal.   Nose: Nose normal.   Mouth/Throat: Oropharynx is clear and moist.   Eyes: Conjunctivae are normal. Pupils are equal, round, and reactive to light. Left eye exhibits no discharge. No scleral icterus.   Cardiovascular: Normal rate, regular rhythm and normal heart sounds.  Exam reveals no gallop and no friction rub.    No murmur heard.  Pulmonary/Chest: Effort normal and breath sounds normal. No respiratory distress. He has no wheezes. He has no rales. He exhibits no tenderness.   Neurological: He is alert and oriented to person, place, and time.   Skin: No rash noted. No cyanosis. Nails show no clubbing.     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     # Chronic non-productive cough   usually has cough following URI. The most likely cause of his cough so far is a postviral infection cough which can prolonged in some individuals. His today's PFT is normal. In 03/2017, he demonstrated some response to bronchodilator, however, the improvement in his FEV1 did not meet the criteria to diagnose asthma. At this time, we plan to observe his symptoms and if cough persists, we may consider obtaining methacholine challenge test. If asthma is diagnosed, he may need an inhale corticosteroid. GERD is also another factor that worsens his postviral cough. However, it usually cause worsening cough in the morning which he does not experience that.    # Pulmonary nodule  2 solid pulmonary nodules in the lower lobes, measuring up to 8 mm were noted on CT chest in 03/2017. He is a  non-smoker and symptomatic other than postviral cough. We plan to follow up CT chest again in February 2018.     We plan to see him back in the clinic in 3 months.    Patient seen and discussed with .    Emily Major MD  PGY-1 Internal Medicine  Pager [phone]      Physician Attestation   I, Thad Gamez, saw this patient with the resident and agree with the resident s findings and plan of care as documented in the resident s note.      I personally reviewed vital signs, medications, labs and imaging.    Key findings:   Chronic cough - mostly likely post-viral. We will need to continue to follow this.  At this point, not sure that he has diagnosis of asthma. Airway reversibility/hyperresponsiveness needs to be shown. Will continue to follow.  Plan to repeat chest CT in February to follow nodules.     Thad Gamez  Date of Service (when I saw the patient): 12/7/17

## 2017-12-07 NOTE — MR AVS SNAPSHOT
"              After Visit Summary   12/7/2017    Natalie Ny    MRN: [id number]           Patient Information     Date Of Birth          1970        Visit Information        Provider Department      12/7/2017 4:35 PM Thad Gamez MD [facility]        Today's Diagnoses     Post-viral cough syndrome    -  1    Pulmonary nodules        History of gastroesophageal reflux (GERD)           Follow-ups after your visit        Who to contact     If you have questions or need follow up information about today's clinic visit or your schedule please contact [facility] directly at [phone].  Normal or non-critical lab and imaging results will be communicated to you by [employer], letter or phone within 4 business days after the clinic has received the results. If you do not hear from us within 7 days, please contact the clinic through [employer] or phone. If you have a critical or abnormal lab result, we will notify you by phone as soon as possible.  Submit refill requests through [employer] or call your pharmacy and they will forward the refill request to us. Please allow 3 business days for your refill to be completed.          Additional Information About Your Visit        MyChart Information     [employer] gives you secure access to your electronic health record. If you see a primary care provider, you can also send messages to your care team and make appointments. If you have questions, please call your primary care clinic.  If you do not have a primary care provider, please call [phone] and they will assist you.        Care EveryWhere ID     This is your Care EveryWhere ID. This could be used by other organizations to access your [place] medical records  [id number]        Your Vitals Were     Pulse Respirations Height Pulse Oximetry BMI (Body Mass Index)       85 16 1.778 m (5' 10\") 94% 28.08 kg/m2        Blood Pressure from Last 3 " Encounters:   12/07/17 127/73   03/24/17 118/73   03/01/17 138/75    Weight from Last 3 Encounters:   12/07/17 88.8 kg (195 lb 11.2 oz)   02/16/17 88.5 kg (195 lb)   10/25/16 89.8 kg (198 lb)              Today, you had the following     No orders found for display       Primary Care Provider Office Phone # Fax #    Jed Knox [phone]       [address] [address]        Equal Access to Services     [facility]: Hadii aad ku hadasho Soomaali, waaxda luqadaha, qaybta kaalmada adegerald, handy isaac . So [facility] [phone].    ATENCIÓN: Si habla español, tiene a ribera disposición servicios gratuitos de asistencia lingüística. [facility] [phone].    We comply with applicable federal civil rights laws and [place] laws. We do not discriminate on the basis of race, color, national origin, age, disability, sex, sexual orientation, or gender identity.            Thank you!     Thank you for choosing [facility]  for your care. Our goal is always to provide you with excellent care. Hearing back from our patients is one way we can continue to improve our services. Please take a few minutes to complete the written survey that you may receive in the mail after your visit with us. Thank you!             Your Updated Medication List - Protect others around you: Learn how to safely use, store and throw away your medicines at www.disposemymeds.org.          This list is accurate as of: 12/7/17 11:59 PM.  Always use your most recent med list.                   Brand Name Dispense Instructions for use Diagnosis    albuterol 108 (90 BASE) MCG/ACT Inhaler    PROAIR HFA/PROVENTIL HFA/VENTOLIN HFA    1 Inhaler    Inhale 2 puffs into the lungs every 6 hours as needed for shortness of breath / dyspnea or wheezing    Cough       finasteride 1 MG tablet    PROPECIA    90 tablet    Take 1 tablet (1 mg) by mouth daily    Hair loss,  Acquired hypothyroidism, High blood cholesterol       fluticasone 220 MCG/ACT Inhaler    FLOVENT HFA    1 Inhaler    Inhale 2 puffs into the lungs 2 times daily    Cough       levothyroxine 200 MCG tablet    SYNTHROID/LEVOTHROID    90 tablet    Take 1 tablet daily    Hypothyroidism       nadolol 20 MG tablet    CORGARD    93 tablet    Take 1 tablet (20 mg) by mouth daily    Syncope, unspecified syncope type       ZANTAC 150 MG tablet   Generic drug:  ranitidine      Take 150 mg by mouth as needed.

## 2017-12-07 NOTE — LETTER
12/7/2017       RE: Natalie Ny  [address]  [facility] [address]     Dear Colleague,    Thank you for referring your patient, Natalie Ny, to the [facility] at [facility]. Please see a copy of my visit note below.    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     He was last seen in the clinic in March 2017 for prolonged cough after respiratory infection in January. The cough was thought to be from reactive airways secondary to URI. Since March, the symptoms seem to improve. However, he had another URI in September and the cough has started again. It is a non-productive cough, better in the morning and worsens in the afternoon. There is no difference of cough between workplace and home. He initially tried Flovent but his cough got worse so he stopped using it. There is no definite trigger of his cough that he can identify except nadolol which he needs to take it before performing the surgery to reduce his tremor. He denied fever, chills, SOB, rash or postnasal drip. He reports occasional chest tightness. He tolerates exercise well without symptoms. His GERD has worsens recently. However, his cough does not get worse after waking up.    He does not smoke and has no pets.    Review Of Systems  Skin: negative  Eyes: negative  Ears/Nose/Throat: positive for dry nose, negative for sinusitis symptoms  Respiratory: per HPI  Cardiovascular: negative  Gastrointestinal: positive for GERD  Genitourinary: negative  Musculoskeletal: negative  Neurologic: negative  Psychiatric: negative  Hematologic/Lymphatic/Immunologic: negative  Endocrine: negative    Current Outpatient Prescriptions   Medication     levothyroxine (SYNTHROID/LEVOTHROID) 200 MCG tablet      finasteride (PROPECIA) 1 MG tablet     ranitidine (ZANTAC) 150 MG tablet     fluticasone (FLOVENT HFA) 220 MCG/ACT Inhaler     albuterol (PROAIR HFA/PROVENTIL HFA/VENTOLIN HFA) 108 (90 BASE) MCG/ACT Inhaler     nadolol (CORGARD) 20 MG tablet     No current facility-administered medications for this visit.      Physical Exam   Constitutional: He is oriented to person, place, and time and well-developed, well-nourished, and in no distress. No distress.   HENT:   Head: Normocephalic and atraumatic.   Right Ear: External ear normal.   Left Ear: External ear normal.   Nose: Nose normal.   Mouth/Throat: Oropharynx is clear and moist.   Eyes: Conjunctivae are normal. Pupils are equal, round, and reactive to light. Left eye exhibits no discharge. No scleral icterus.   Cardiovascular: Normal rate, regular rhythm and normal heart sounds.  Exam reveals no gallop and no friction rub.    No murmur heard.  Pulmonary/Chest: Effort normal and breath sounds normal. No respiratory distress. He has no wheezes. He has no rales. He exhibits no tenderness.   Neurological: He is alert and oriented to person, place, and time.   Skin: No rash noted. No cyanosis. Nails show no clubbing.     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     # Chronic non-productive cough   usually has cough following URI. The most likely cause of his cough so far is a postviral infection cough which can prolonged in some individuals. His today's PFT is normal. In 03/2017, he demonstrated some response to bronchodilator, however, the improvement in his FEV1 did not meet the criteria to diagnose asthma. At this time, we plan to observe his symptoms and if cough persists, we may consider obtaining methacholine challenge test. If asthma is diagnosed, he may  need an inhale corticosteroid. GERD is also another factor that worsens his postviral cough. However, it usually cause worsening cough in the morning which he does not experience that.    # Pulmonary nodule  2 solid pulmonary nodules in the lower lobes, measuring up to 8 mm were noted on CT chest in 03/2017. He is a non-smoker and symptomatic other than postviral cough. We plan to follow up CT chest again in February 2018.     We plan to see him back in the clinic in 3 months.    Patient seen and discussed with .    Emily Major MD  PGY-1 Internal Medicine  Pager [phone]      Physician Attestation   I, Thad Gamez, saw this patient with the resident and agree with the resident s findings and plan of care as documented in the resident s note.      I personally reviewed vital signs, medications, labs and imaging.    Key findings:   Chronic cough - mostly likely post-viral. We will need to continue to follow this.  At this point, not sure that he has diagnosis of asthma. Airway reversibility/hyperresponsiveness needs to be shown. Will continue to follow.  Plan to repeat chest CT in February to follow nodules.     Thad Gamez  Date of Service (when I saw the patient): 12/7/17      Again, thank you for allowing me to participate in the care of your patient.      Sincerely,    Thad Gamez MD

## 2017-12-07 NOTE — NURSING NOTE
Chief Complaint   Patient presents with     Cough     Patient is being seen for follow up of cough.      Lakesha Juárez  CMA at 4:00 PM on 12/7/2017

## 2017-12-18 LAB
DLCOUNC-%PRED-PRE: 131 %
DLCOUNC-PRE: 41.51 ML/MIN/MMHG
DLCOUNC-PRED: 31.56 ML/MIN/MMHG
ERV-%PRED-PRE: 101 %
ERV-PRE: 1.39 L
ERV-PRED: 1.37 L
EXPTIME-PRE: 7.53 SEC
FEF2575-%PRED-PRE: 94 %
FEF2575-PRE: 3.56 L/SEC
FEF2575-PRED: 3.78 L/SEC
FEFMAX-%PRED-PRE: 128 %
FEFMAX-PRE: 12.82 L/SEC
FEFMAX-PRED: 9.98 L/SEC
FEV1-%PRED-PRE: 100 %
FEV1-PRE: 4.06 L
FEV1FEV6-PRE: 80 %
FEV1FEV6-PRED: 81 %
FEV1FVC-PRE: 79 %
FEV1FVC-PRED: 80 %
FEV1SVC-PRE: 72 %
FEV1SVC-PRED: 76 %
FIFMAX-PRE: 9.87 L/SEC
FVC-%PRED-PRE: 101 %
FVC-PRE: 5.16 L
FVC-PRED: 5.09 L
IC-%PRED-PRE: 107 %
IC-PRE: 4.24 L
IC-PRED: 3.94 L
VA-%PRED-PRE: 113 %
VA-PRE: 7.72 L
VC-%PRED-PRE: 106 %
VC-PRE: 5.63 L
VC-PRED: 5.31 L

## 2017-12-19 DIAGNOSIS — R05.9 COUGH: Primary | ICD-10-CM

## 2017-12-19 DIAGNOSIS — R93.89 ABNORMAL FINDING ON IMAGING: ICD-10-CM

## 2018-01-22 ENCOUNTER — RADIANT APPOINTMENT (OUTPATIENT)
Dept: CT IMAGING | Facility: CLINIC | Age: 48
End: 2018-01-22
Payer: COMMERCIAL

## 2018-01-22 DIAGNOSIS — E03.4 HYPOTHYROIDISM DUE TO ACQUIRED ATROPHY OF THYROID: ICD-10-CM

## 2018-01-22 DIAGNOSIS — R05.9 COUGH: ICD-10-CM

## 2018-01-22 DIAGNOSIS — R93.89 ABNORMAL FINDING ON IMAGING: ICD-10-CM

## 2018-01-22 LAB — TSH SERPL DL<=0.005 MIU/L-ACNC: 4.14 MU/L (ref 0.4–4)

## 2018-01-23 DIAGNOSIS — L65.9 HAIR LOSS: ICD-10-CM

## 2018-01-23 DIAGNOSIS — E03.9 ACQUIRED HYPOTHYROIDISM: ICD-10-CM

## 2018-01-23 DIAGNOSIS — E78.00 HIGH BLOOD CHOLESTEROL: ICD-10-CM

## 2018-01-24 RX ORDER — FINASTERIDE 1 MG/1
1 TABLET, FILM COATED ORAL DAILY
Qty: 30 TABLET | Refills: 0 | Status: SHIPPED | OUTPATIENT
Start: 2018-01-24 | End: 2018-01-30

## 2018-01-26 ENCOUNTER — MYC MEDICAL ADVICE (OUTPATIENT)
Dept: FAMILY MEDICINE | Facility: CLINIC | Age: 48
End: 2018-01-26

## 2018-01-26 DIAGNOSIS — E78.00 HIGH BLOOD CHOLESTEROL: ICD-10-CM

## 2018-01-26 DIAGNOSIS — L65.9 HAIR LOSS: ICD-10-CM

## 2018-01-26 DIAGNOSIS — E03.4 HYPOTHYROIDISM DUE TO ACQUIRED ATROPHY OF THYROID: Primary | ICD-10-CM

## 2018-01-26 DIAGNOSIS — E03.9 ACQUIRED HYPOTHYROIDISM: ICD-10-CM

## 2018-01-29 RX ORDER — FINASTERIDE 1 MG/1
1 TABLET, FILM COATED ORAL DAILY
Qty: 90 TABLET | Refills: 0 | Status: CANCELLED | OUTPATIENT
Start: 2018-01-29

## 2018-01-29 NOTE — TELEPHONE ENCOUNTER
finasteride (PROPECIA) 1 MG tablet      Last Written Prescription Date:  1/24/18  Last Fill Quantity: 30,   # refills: 0  Last Office Visit : 2/16/17  Future Office visit:  none    Routing refill request to provider for review/approval because:  Drug not on the [facility] protocol

## 2018-02-01 RX ORDER — FINASTERIDE 1 MG/1
1 TABLET, FILM COATED ORAL DAILY
Qty: 90 TABLET | Refills: 0 | Status: SHIPPED | OUTPATIENT
Start: 2018-02-01 | End: 2018-04-04

## 2018-02-15 DIAGNOSIS — E03.9 HYPOTHYROIDISM: ICD-10-CM

## 2018-02-15 RX ORDER — LEVOTHYROXINE SODIUM 200 UG/1
TABLET ORAL
Qty: 90 TABLET | Refills: 2 | Status: SHIPPED | OUTPATIENT
Start: 2018-02-15 | End: 2018-02-15

## 2018-02-15 RX ORDER — LEVOTHYROXINE SODIUM 200 UG/1
TABLET ORAL
Qty: 90 TABLET | Refills: 2 | Status: SHIPPED | OUTPATIENT
Start: 2018-02-15 | End: 2019-05-01

## 2018-02-15 RX ORDER — LEVOTHYROXINE SODIUM 200 UG/1
TABLET ORAL
Qty: 90 TABLET | Refills: 2 | Status: SHIPPED | OUTPATIENT
Start: 2018-02-15 | End: 2019-02-07

## 2018-04-04 ENCOUNTER — OFFICE VISIT (OUTPATIENT)
Dept: FAMILY MEDICINE | Facility: CLINIC | Age: 48
End: 2018-04-04
Payer: COMMERCIAL

## 2018-04-04 VITALS
BODY MASS INDEX: 27.43 KG/M2 | HEART RATE: 82 BPM | WEIGHT: 191.6 LBS | DIASTOLIC BLOOD PRESSURE: 71 MMHG | SYSTOLIC BLOOD PRESSURE: 107 MMHG | HEIGHT: 70 IN

## 2018-04-04 DIAGNOSIS — E03.9 ACQUIRED HYPOTHYROIDISM: ICD-10-CM

## 2018-04-04 DIAGNOSIS — Z00.00 ROUTINE GENERAL MEDICAL EXAMINATION AT A HEALTH CARE FACILITY: Primary | ICD-10-CM

## 2018-04-04 DIAGNOSIS — E78.00 HIGH BLOOD CHOLESTEROL: ICD-10-CM

## 2018-04-04 DIAGNOSIS — L65.9 HAIR LOSS: ICD-10-CM

## 2018-04-04 RX ORDER — FINASTERIDE 1 MG/1
1 TABLET, FILM COATED ORAL DAILY
Qty: 90 TABLET | Refills: 3 | Status: SHIPPED | OUTPATIENT
Start: 2018-04-04 | End: 2019-04-09

## 2018-04-04 ASSESSMENT — PAIN SCALES - GENERAL: PAINLEVEL: NO PAIN (0)

## 2018-04-04 NOTE — NURSING NOTE
Chief Complaint   Patient presents with     Physical     pt here for physical     Rosenda Spears CMA at 2:44 PM on 4/4/2018.

## 2018-04-04 NOTE — MR AVS SNAPSHOT
After Visit Summary   4/4/2018    Natalie Ny    MRN: [id number]           Patient Information     Date Of Birth          1970        Visit Information        Provider Department      4/4/2018 2:35 PM Jed Knox MD [facility]        Today's Diagnoses     Routine general medical examination at a health care facility    -  1    Hair loss        Acquired hypothyroidism        High blood cholesterol          Care Instructions    Primary Care Center: [phone]     Primary Care Center Medication Refill Request Information:  * Please contact your pharmacy regarding ANY request for medication refills.  ** [facility] Prescription Fax = [phone]  * Please allow 3 business days for routine medication refills.  * Please allow 5 business days for controlled substance medication refills.     Primary Care Center Test Result notification information:  *You will be notified with in 7-10 days of your appointment day regarding the results of your test.  If you are on MyChart you will be notified as soon as the provider has reviewed the results and signed off on them.            Follow-ups after your visit        Who to contact     Please call your clinic at [phone] to:    Ask questions about your health    Make or cancel appointments    Discuss your medicines    Learn about your test results    Speak to your doctor            Additional Information About Your Visit        MyChart Information     [employer] gives you secure access to your electronic health record. If you see a primary care provider, you can also send messages to your care team and make appointments. If you have questions, please call your primary care clinic.  If you do not have a primary care provider, please call [phone] and they will assist you.      [employer] is an electronic gateway that provides easy, online access to your medical records. With [employer], you can request a clinic appointment, read your test  "results, renew a prescription or communicate with your care team.     To access your existing account, please contact your [facility] or call [phone] for assistance.        Care EveryWhere ID     This is your Care EveryWhere ID. This could be used by other organizations to access your [place] medical records  [id number]        Your Vitals Were     Pulse Height BMI (Body Mass Index)             82 1.77 m (5' 9.69\") 27.74 kg/m2          Blood Pressure from Last 3 Encounters:   04/04/18 107/71   12/07/17 127/73   03/24/17 118/73    Weight from Last 3 Encounters:   04/04/18 86.9 kg (191 lb 9.6 oz)   12/07/17 88.8 kg (195 lb 11.2 oz)   02/16/17 88.5 kg (195 lb)                 Today's Medication Changes          These changes are accurate as of 4/4/18 11:59 PM.  If you have any questions, ask your nurse or doctor.               These medicines have changed or have updated prescriptions.        Dose/Directions    finasteride 1 MG tablet   Commonly known as:  PROPECIA   This may have changed:  additional instructions   Used for:  Hair loss, Acquired hypothyroidism, High blood cholesterol   Changed by:  Jed Knox MD        Dose:  1 mg   Take 1 tablet (1 mg) by mouth daily   Quantity:  90 tablet   Refills:  3            Where to get your medicines      These medications were sent to [facility] - [place], [place] - [address]  [address] [facility] [address]     Phone:  [phone]     finasteride 1 MG tablet                Primary Care Provider Office Phone # Fax #    Jed Knox [phone]       [address] [address]        Equal Access to Services     [facility] : Samantha Iniguez, trav andino, handy romo. So [facility] [phone].    ATENCIÓN: Si habla español, tiene a ribera disposición " servicios gratuitos de asistencia lingüística. Donald myers [phone].    We comply with applicable federal civil rights laws and [place] laws. We do not discriminate on the basis of race, color, national origin, age, disability, sex, sexual orientation, or gender identity.            Thank you!     Thank you for choosing [facility]  for your care. Our goal is always to provide you with excellent care. Hearing back from our patients is one way we can continue to improve our services. Please take a few minutes to complete the written survey that you may receive in the mail after your visit with us. Thank you!             Your Updated Medication List - Protect others around you: Learn how to safely use, store and throw away your medicines at www.disposemymeds.org.          This list is accurate as of 4/4/18 11:59 PM.  Always use your most recent med list.                   Brand Name Dispense Instructions for use Diagnosis    albuterol 108 (90 Base) MCG/ACT Inhaler    PROAIR HFA/PROVENTIL HFA/VENTOLIN HFA    1 Inhaler    Inhale 2 puffs into the lungs every 6 hours as needed for shortness of breath / dyspnea or wheezing    Cough       finasteride 1 MG tablet    PROPECIA    90 tablet    Take 1 tablet (1 mg) by mouth daily    Hair loss, Acquired hypothyroidism, High blood cholesterol       fluticasone 220 MCG/ACT Inhaler    FLOVENT HFA    1 Inhaler    Inhale 2 puffs into the lungs 2 times daily    Cough       * levothyroxine 200 MCG tablet    SYNTHROID/LEVOTHROID    90 tablet    Take 1 tablet daily    Hypothyroidism       * levothyroxine 200 MCG tablet    SYNTHROID/LEVOTHROID    90 tablet    Take 1 tablet daily    Hypothyroidism       nadolol 20 MG tablet    CORGARD    93 tablet    Take 1 tablet (20 mg) by mouth daily    Syncope, unspecified syncope type       ZANTAC 150 MG tablet   Generic drug:  ranitidine      Take 150 mg by mouth as needed.        * Notice:  This list has 2 medication(s) that are  the same as other medications prescribed for you. Read the directions carefully, and ask your doctor or other care provider to review them with you.

## 2018-04-04 NOTE — PROGRESS NOTES
[facility]  Primary Care Center   Jed Knox MD  04/04/2018      Chief Complaint:   Physical Examination.     History of Present Illness:   Natalie Ny is a 47 year old male with a history of GERD and grave's disease who presents alone for a physical examination.     Cough: The patient and I last met on 2/16/17 for a cough. He went to the pulmonologist where CT imaging was performed and incidental nodules were found. He had a repeat scan on 1/22/18 and found his nodules were stable. He has had multiple PPDs since. He was prescribed fluticasone in March of 2018 and he reports feeling improvement, but tests, per his report, showed no actual improvement. He experienced a prolonged cough after a respiratory infection in December of 2017, but is no longer taking fluticasone.    Thyroid: The patient's TSH was check on 1/22/18 in which his value was a 4.14; he reports not taking his levothyroxin as frequently as he should have. He is scheduled to have this repeated in May.     Exercise: He exercises 1-2 times a week, often times swimming.    Medications: The patient reports using nadolol on days he does surgery.      Health Maintenance:  Lipid panel: every 5 years, yearly if risk factors - Recommended  Immunizations (Tetanus every 10 years, Influenza yearly, Pneumonia PPV-23 and PCV-13 age ? 65 or sooner if risk factors) - Up to Date   Immunization History   Administered Date(s) Administered     HEPA 08/12/2010, 01/11/2013     Influenza (IIV3) PF 09/22/2009, 12/01/2012     Meningococcal (Menomune ) 08/12/2010     TDAP Vaccine (Boostrix) 01/11/2013     Typhoid IM 08/12/2010      Review of Systems:   Pertinent items are noted in HPI, remainder of complete ROS is negative.      Active Medications:      levothyroxine (SYNTHROID/LEVOTHROID) 200 MCG tablet, Take 1 tablet daily, Disp: 90 tablet, Rfl: 2     levothyroxine (SYNTHROID/LEVOTHROID) 200 MCG tablet, Take 1 tablet daily, Disp: 90 tablet, Rfl: 2     finasteride  "(PROPECIA) 1 MG tablet, Take 1 tablet (1 mg) by mouth daily . Patient needs to see primary provider for further refills., Disp: 90 tablet, Rfl: 0     fluticasone (FLOVENT HFA) 220 MCG/ACT Inhaler, Inhale 2 puffs into the lungs 2 times daily (Patient not taking: Reported on 12/7/2017), Disp: 1 Inhaler, Rfl: 3     albuterol (PROAIR HFA/PROVENTIL HFA/VENTOLIN HFA) 108 (90 BASE) MCG/ACT Inhaler, Inhale 2 puffs into the lungs every 6 hours as needed for shortness of breath / dyspnea or wheezing (Patient not taking: Reported on 12/7/2017), Disp: 1 Inhaler, Rfl: 1     nadolol (CORGARD) 20 MG tablet, Take 1 tablet (20 mg) by mouth daily (Patient not taking: Reported on 12/7/2017), Disp: 93 tablet, Rfl: 3     ranitidine (ZANTAC) 150 MG tablet, Take 150 mg by mouth as needed., Disp: , Rfl:       Allergies:   Amoxicillin and Penicillins      Past Medical History:  Grave's disease  Syncope and collapse    Past Surgical History:   The patient reports no past surgeries.    Family History:   The patient reports no notable family history.      Social History:   Patient is  with 3 kids and a non-smoker. Denies alcohol use.      Physical Exam:   /71  Pulse 82  Ht 1.77 m (5' 9.69\")  Wt 86.9 kg (191 lb 9.6 oz)  BMI 27.74 kg/m2   Constitutional: Alert. In no distress.  Head: Normocephalic. No masses, lesions, tenderness or abnormalities.  ENT: No neck nodes or sinus tenderness.  Cardiovascular: RRR. No murmurs, clicks, gallops, or rub.  Respiratory: Clear to auscultation bilaterally, no wheezes or crackles.  Gastrointestinal: Abdomen soft. Non-tender. BS normal. No masses or organomegaly.  Musculoskeletal: Extremities normal. No gross deformities noted. Normal muscle tone.  Skin: No suspicious lesions. No rashes.  Neurologic: Gait normal. Reflexes normal and symmetric. Sensation grossly WNL.  Psychiatric: Mentation appears normal. Normal affect.   Hematologic/Lymphatic/Immunologic: Normal cervical lymph nodes. "   Genitourinary: Penis and testes normal. No inguinal hernia noted.      Assessment and Plan:    Hair loss  Acquired hypothyroidism  High blood cholesterol  Routine general medical examination at a health care facility    Refill of finasteride was provided. We will obtain routine laboratory testing, including a hemoglobin A1c and a lipid panel. He follows with endocrinology for management of hypothyroidism.     - Lipid panel reflex to direct LDL Fasting  - Hemoglobin A1c  - finasteride (PROPECIA) 1 MG tablet  Dispense: 90 tablet; Refill: 3    Follow-up: One year for routine physical examination.          Scribe Disclosure:   I, Dylan Sinha, am serving as a scribe to document services personally performed by Jed Knox MD at this visit, based upon the provider's statements to me. All documentation has been reviewed by the aforementioned provider prior to being entered into the official medical record.     Portions of this medical record were completed by a scribe. UPON MY REVIEW AND AUTHENTICATION BY ELECTRONIC SIGNATURE, this confirms (a) I performed the applicable clinical services, and (b) the record is accurate.   Jed Knox MD

## 2018-04-04 NOTE — PATIENT INSTRUCTIONS
[facility]: [phone]     [facility] Medication Refill Request Information:  * Please contact your pharmacy regarding ANY request for medication refills.  ** [facility] Prescription Fax = [phone]  * Please allow 3 business days for routine medication refills.  * Please allow 5 business days for controlled substance medication refills.     [facility] Test Result notification information:  *You will be notified with in 7-10 days of your appointment day regarding the results of your test.  If you are on MyChart you will be notified as soon as the provider has reviewed the results and signed off on them.

## 2018-05-14 DIAGNOSIS — R55 SYNCOPE, UNSPECIFIED SYNCOPE TYPE: ICD-10-CM

## 2018-05-14 RX ORDER — NADOLOL 20 MG/1
20 TABLET ORAL DAILY
Qty: 93 TABLET | Refills: 3 | Status: SHIPPED | OUTPATIENT
Start: 2018-05-14 | End: 2019-04-30

## 2018-10-19 ENCOUNTER — MYC MEDICAL ADVICE (OUTPATIENT)
Dept: FAMILY MEDICINE | Facility: CLINIC | Age: 48
End: 2018-10-19

## 2018-10-19 DIAGNOSIS — K62.5 RECTAL BLEEDING: Primary | ICD-10-CM

## 2018-10-19 NOTE — TELEPHONE ENCOUNTER
GI could see him but usually takes a while to get in. Could just be hemorrhoids, but quicker would be (assuming it was red blood, not melena or black), to get into Colorectal, they could check for those first and are usually quick to see. In meantime could do CBC/diff, INR labs for rectal bleed. I'd be happy to see him too if anything takes long.

## 2018-10-22 DIAGNOSIS — K62.5 RECTAL BLEEDING: ICD-10-CM

## 2018-10-22 DIAGNOSIS — E03.4 HYPOTHYROIDISM DUE TO ACQUIRED ATROPHY OF THYROID: ICD-10-CM

## 2018-10-22 LAB
BASOPHILS # BLD AUTO: 0 10E9/L (ref 0–0.2)
BASOPHILS NFR BLD AUTO: 0.5 %
DIFFERENTIAL METHOD BLD: ABNORMAL
EOSINOPHIL # BLD AUTO: 0.1 10E9/L (ref 0–0.7)
EOSINOPHIL NFR BLD AUTO: 1.8 %
ERYTHROCYTE [DISTWIDTH] IN BLOOD BY AUTOMATED COUNT: 12.3 % (ref 10–15)
HCT VFR BLD AUTO: 42.1 % (ref 40–53)
HGB BLD-MCNC: 14.1 G/DL (ref 13.3–17.7)
IMM GRANULOCYTES # BLD: 0 10E9/L (ref 0–0.4)
IMM GRANULOCYTES NFR BLD: 0.2 %
INR PPP: 1.14 (ref 0.86–1.14)
LYMPHOCYTES # BLD AUTO: 2 10E9/L (ref 0.8–5.3)
LYMPHOCYTES NFR BLD AUTO: 33.7 %
MCH RBC QN AUTO: 30.3 PG (ref 26.5–33)
MCHC RBC AUTO-ENTMCNC: 33.5 G/DL (ref 31.5–36.5)
MCV RBC AUTO: 90 FL (ref 78–100)
MONOCYTES # BLD AUTO: 0.4 10E9/L (ref 0–1.3)
MONOCYTES NFR BLD AUTO: 7.1 %
NEUTROPHILS # BLD AUTO: 3.4 10E9/L (ref 1.6–8.3)
NEUTROPHILS NFR BLD AUTO: 56.7 %
NRBC # BLD AUTO: 0 10*3/UL
NRBC BLD AUTO-RTO: 0 /100
PLATELET # BLD AUTO: 139 10E9/L (ref 150–450)
RBC # BLD AUTO: 4.66 10E12/L (ref 4.4–5.9)
T4 FREE SERPL-MCNC: 1.33 NG/DL (ref 0.76–1.46)
TSH SERPL DL<=0.005 MIU/L-ACNC: 4.05 MU/L (ref 0.4–4)
WBC # BLD AUTO: 6.1 10E9/L (ref 4–11)

## 2019-02-05 ENCOUNTER — MYC MEDICAL ADVICE (OUTPATIENT)
Dept: FAMILY MEDICINE | Facility: CLINIC | Age: 49
End: 2019-02-05

## 2019-02-07 ENCOUNTER — ANCILLARY PROCEDURE (OUTPATIENT)
Dept: GENERAL RADIOLOGY | Facility: CLINIC | Age: 49
End: 2019-02-07
Attending: FAMILY MEDICINE
Payer: COMMERCIAL

## 2019-02-07 ENCOUNTER — OFFICE VISIT (OUTPATIENT)
Dept: FAMILY MEDICINE | Facility: CLINIC | Age: 49
End: 2019-02-07
Payer: COMMERCIAL

## 2019-02-07 VITALS
BODY MASS INDEX: 28.42 KG/M2 | DIASTOLIC BLOOD PRESSURE: 85 MMHG | HEART RATE: 56 BPM | RESPIRATION RATE: 20 BRPM | OXYGEN SATURATION: 100 % | WEIGHT: 196.3 LBS | SYSTOLIC BLOOD PRESSURE: 132 MMHG

## 2019-02-07 DIAGNOSIS — R07.89 CHEST WALL PAIN: Primary | ICD-10-CM

## 2019-02-07 DIAGNOSIS — R07.89 CHEST WALL PAIN: ICD-10-CM

## 2019-02-07 ASSESSMENT — PAIN SCALES - GENERAL: PAINLEVEL: NO PAIN (0)

## 2019-02-07 NOTE — NURSING NOTE
"Chief Complaint   Patient presents with     Fall     Fell at home and \" Hit center of my chest and left side of chest.\"   Soila Vicente LPN 11:25 AM on 2/7/2019      "

## 2019-02-07 NOTE — PROGRESS NOTES
[facility]  Primary Care Center   Jed Knox MD  02/07/2019      Chief Complaint:   Fall       History of Present Illness:   Natalie Ny is a 48 year old male with a history of GERD and Grave's disease who presents for evaluation of chest pain secondary to a fall. Last Tuesday, 1/29, the patient was playing with his children when he spun around trying to catch his kid and tripped over a piece of furniture, which resulted in him hitting his chest on the arm of the couch. At first he felt okay, just stunned. Throughout the week he went swimming and felt okay doing this, just a little bit of pain. Over the weekend, he felt it started to get worse. The pain increases with coughing or sneezing, extending of the back, lifting his kids and bending down to tie shoes. It is also hard for him to sleep at night trying to find a comfortable position. He reports no SOB or other breathing problems. He has been taking one or two tablets of Aleve every 12 hours. He is concerned today it might be a hairline fracture.    The patient got a colonoscopy last week. Discussed results with him and impression is below.     Colonoscopy Impression 1/28/2019:           - Normal colon and terminal ileum.  - The bleeding may have been from a hemorrhoid or anal fissure which has now healed.  - No specimens collected.  The codes documented in this report are preliminary and   upon  review may be revised to meet current   compliance requirements.  Nacho Fritz MD       Review of Systems:   Pertinent items are noted in HPI, remainder of complete ROS is negative.      Active Medications:     Current Outpatient Medications:      albuterol (PROAIR HFA/PROVENTIL HFA/VENTOLIN HFA) 108 (90 BASE) MCG/ACT Inhaler, Inhale 2 puffs into the lungs every 6 hours as needed for shortness of breath / dyspnea or wheezing, Disp: 1 Inhaler, Rfl: 1     finasteride (PROPECIA) 1 MG tablet, Take 1 tablet (1 mg) by mouth daily, Disp: 90 tablet, Rfl:  3     fluticasone (FLOVENT HFA) 220 MCG/ACT Inhaler, Inhale 2 puffs into the lungs 2 times daily, Disp: 1 Inhaler, Rfl: 3     levothyroxine (SYNTHROID/LEVOTHROID) 200 MCG tablet, Take 1 tablet daily, Disp: 90 tablet, Rfl: 2     nadolol (CORGARD) 20 MG tablet, Take 1 tablet (20 mg) by mouth daily, Disp: 93 tablet, Rfl: 3     ranitidine (ZANTAC) 150 MG tablet, Take 150 mg by mouth as needed., Disp: , Rfl:       Allergies:   Amoxicillin and Penicillins      Past Medical History:  Grave's disease  Syncope and collapse     Past Surgical History:  No past surgical histories.    Family History:   No chronic family history.      Social History:   Tobacco Use: none  Alcohol Use: none  PCP: Jed Knox      Physical Exam:   /85 (BP Location: Right arm, Patient Position: Sitting, Cuff Size: Adult Regular)   Pulse 56   Resp 20   Wt 89 kg (196 lb 4.8 oz)   SpO2 100%   BMI 28.42 kg/m     Constitutional: Alert. In no distress.  Head: Normocephalic. No masses, lesions, tenderness or abnormalities.  Cardiovascular: RRR. No murmurs, clicks, gallops, or rub.  Respiratory: Clear to auscultation bilaterally, no wheezes or crackles.  Musculoskeletal: Extremities normal. No gross deformities noted. Normal muscle tone. Mild tenderness over anterior mid chest, mild bruising left anterior chest along exterior border   Neurologic: Gait normal. Reflexes normal and symmetric. Sensation grossly WNL.  Psychiatric: Mentation appears normal. Normal affect.      Assessment and Plan:  Chest wall pain  Contusion vs fracture. Awaiting X-ray report. Plan is to treat conservatively.   - XR Chest 2 Views  - XR Sternum 2 Views       Follow-up: If pain worsens.          Scribe Disclosure:   Regina VALLEJO, am serving as a scribe to document services personally performed by Jed Knox MD at this visit, based upon the provider's statements to me. All documentation has been reviewed by the aforementioned provider prior to being  entered into the official medical record.     Portions of this medical record were completed by a scribe. UPON MY REVIEW AND AUTHENTICATION BY ELECTRONIC SIGNATURE, this confirms (a) I performed the applicable clinical services, and (b) the record is accurate.     Discussed by phone w/ radiology then w/ Sports Med curbside then w/ pt by phone, if no better by next week he will let me know and I'll arrange non contrast chest ct (could double check nodules then too). Avoid strenous motions eg swimming. This was all after pt left as xray shows fracture.  Jed Knox MD

## 2019-04-09 DIAGNOSIS — E03.9 ACQUIRED HYPOTHYROIDISM: ICD-10-CM

## 2019-04-09 DIAGNOSIS — L65.9 HAIR LOSS: ICD-10-CM

## 2019-04-09 DIAGNOSIS — E78.00 HIGH BLOOD CHOLESTEROL: ICD-10-CM

## 2019-04-13 NOTE — TELEPHONE ENCOUNTER
" finasteride (PROPECIA) 1 MG tablet  Last Written Prescription Date:  4/4/18  Last Fill Quantity: 90,   # refills: 3  Last Office Visit :2/7/19  Future Office visit:  None    4/4/18  Abilio. Refill of finasteride was provided. We will obtain routine laboratory testing, including a hemoglobin A1c and a lipid panel    \" Refill request is not for Imiquimod, 5-Fluorouracil, or Finasteride       If Imiquimod, 5-Fluorouracil, or Finasteride, may refill if indicated in progress notes.        Routing because: ,mentioned in note 4/4/18. Labs needed for rf?  "

## 2019-04-16 RX ORDER — FINASTERIDE 1 MG/1
1 TABLET, FILM COATED ORAL DAILY
Qty: 90 TABLET | Refills: 0 | Status: SHIPPED | OUTPATIENT
Start: 2019-04-16 | End: 2020-04-20

## 2019-04-30 DIAGNOSIS — R55 SYNCOPE, UNSPECIFIED SYNCOPE TYPE: ICD-10-CM

## 2019-04-30 RX ORDER — NADOLOL 20 MG/1
20 TABLET ORAL DAILY
Qty: 93 TABLET | Refills: 0 | Status: SHIPPED | OUTPATIENT
Start: 2019-04-30 | End: 2019-07-27

## 2019-04-30 NOTE — TELEPHONE ENCOUNTER
Last Clinic Visit: 3/3/2016  [facility]  Plan RTC in 2 years.  Message sent to clinic coordinator for scheduling.

## 2019-05-02 ENCOUNTER — TELEPHONE (OUTPATIENT)
Dept: ENDOCRINOLOGY | Facility: CLINIC | Age: 49
End: 2019-05-02

## 2019-05-02 NOTE — TELEPHONE ENCOUNTER
----- Message from Char ROBLES Link sent at 5/2/2019  1:27 PM CDT -----  Regarding: RE: needs to be seen   Sent mychart message.  ----- Message -----  From: Annika Linton RN  Sent: 5/2/2019   9:46 AM  To: Clinic [id number]  Subject: needs to be seen                                 Last saw Lubna 2016  Please help schedule with Lubna stokes or  maybe his PCP is managing.

## 2019-05-07 ENCOUNTER — TELEPHONE (OUTPATIENT)
Dept: CARDIOLOGY | Facility: CLINIC | Age: 49
End: 2019-05-07

## 2019-05-07 NOTE — TELEPHONE ENCOUNTER
----- Message from Vy Emery RN sent at 4/30/2019  2:54 PM CDT -----  Regarding: Appoiontment  Please call to schedule.  Last Clinic Visit: 3/3/2016  [facility]    [facility] RTC in 2 years.       Thanks    I do not need any follow up on the scheduling of this appointment unless the patient will no longer be receiving care in our clinic.

## 2019-05-07 NOTE — TELEPHONE ENCOUNTER
Patient due to follow up with Dr. Kirkland in clinic prior to receiving additional medication refills. Left message on 5/7/19 requesting patient call main call center to schedule.

## 2019-06-13 ENCOUNTER — OFFICE VISIT (OUTPATIENT)
Dept: CARDIOLOGY | Facility: CLINIC | Age: 49
End: 2019-06-13
Attending: INTERNAL MEDICINE
Payer: COMMERCIAL

## 2019-06-13 ENCOUNTER — PRE VISIT (OUTPATIENT)
Dept: CARDIOLOGY | Facility: CLINIC | Age: 49
End: 2019-06-13

## 2019-06-13 VITALS
WEIGHT: 195.4 LBS | BODY MASS INDEX: 27.97 KG/M2 | SYSTOLIC BLOOD PRESSURE: 118 MMHG | HEART RATE: 63 BPM | HEIGHT: 70 IN | DIASTOLIC BLOOD PRESSURE: 78 MMHG | OXYGEN SATURATION: 96 %

## 2019-06-13 DIAGNOSIS — R55 VASOVAGAL SYNCOPE: Primary | ICD-10-CM

## 2019-06-13 PROCEDURE — 93005 ELECTROCARDIOGRAM TRACING: CPT | Mod: ZF

## 2019-06-13 PROCEDURE — G0463 HOSPITAL OUTPT CLINIC VISIT: HCPCS | Mod: 25,ZF

## 2019-06-13 PROCEDURE — 93010 ELECTROCARDIOGRAM REPORT: CPT | Mod: ZP | Performed by: INTERNAL MEDICINE

## 2019-06-13 PROCEDURE — 99213 OFFICE O/P EST LOW 20 MIN: CPT | Mod: 25 | Performed by: INTERNAL MEDICINE

## 2019-06-13 ASSESSMENT — PAIN SCALES - GENERAL: PAINLEVEL: NO PAIN (0)

## 2019-06-13 ASSESSMENT — MIFFLIN-ST. JEOR: SCORE: 1767.58

## 2019-06-13 NOTE — PROGRESS NOTES
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    Continues to use for tremor control nadolol on days that he operates     ECG today WNL     No intercurrent illnesses of note    PAST MEDICAL HISTORY:  No past medical history on file.    CURRENT MEDICATIONS:  Current Outpatient Medications   Medication Sig Dispense Refill     albuterol (PROAIR HFA/PROVENTIL HFA/VENTOLIN HFA) 108 (90 BASE) MCG/ACT Inhaler Inhale 2 puffs into the lungs every 6 hours as needed for shortness of breath / dyspnea or wheezing 1 Inhaler 1     finasteride (PROPECIA) 1 MG tablet Take 1 tablet (1 mg) by mouth daily Please have annual check up with primary provider for further refills. 90 tablet 0     fluticasone (FLOVENT HFA) 220 MCG/ACT Inhaler Inhale 2 puffs into the lungs 2 times daily 1 Inhaler 3     levothyroxine (SYNTHROID/LEVOTHROID) 200 MCG tablet Take 1 tablet daily 90 tablet 0     nadolol (CORGARD) 20 MG tablet Take 1 tablet (20 mg) by mouth daily 93 tablet 0     ranitidine (ZANTAC) 150 MG tablet Take 150 mg by mouth as needed.         PAST SURGICAL HISTORY:  No past surgical history on file.    ALLERGIES:     Allergies   Allergen Reactions     Amoxicillin Rash     Penicillins Rash       SOCIAL HISTORY:  Social History     Tobacco Use     Smoking status: Never Smoker     Smokeless tobacco: Never Used   Substance Use Topics     Alcohol use: None     Drug use: None       ROS:   Constitutional: No fever, chills, or sweats. Weight stable.   ENT: No visual disturbance, ear ache, epistaxis, sore throat.   Cardiovascular: As per HPI.   Respiratory: No cough, hemoptysis.    GI: No nausea, vomiting, hematemesis, melena, or  "hematochezia.   : No hematuria.   Integument: Negative.   Psychiatric: Negative.   Hematologic: no easy bleeding.  Neuro: Negative.   Endocrinology: No significant heat or cold intolerance   Musculoskeletal: No myalgia.    Exam:  /78 (BP Location: Left arm, Patient Position: Chair, Cuff Size: Adult Regular)   Pulse 63   Ht 1.778 m (5' 10\")   Wt 88.6 kg (195 lb 6.4 oz)   SpO2 96%   BMI 28.04 kg/m   Repeat Blood Pressure:  BP Pulse Site Cuff Size Time Date   118/78 63 Left arm Adult Regular  4:39 PM 6/13/2019     Pain Information  Score Location Time Date   No Pain (0) ---  4:39 PM 6/13/2019   Comment: No SOB   No orthostatic vitals data filed.  No peak flow data filed.    GENERAL APPEARANCE: healthy appearance, alert and no distress  HEENT: no icterus, no xanthelasmas, normal pupil size  NECK: no adenopathy, no asymmetry, masses, or scars, thyroid normal JVP not elevated  RESPIRATORY: lungs clear to auscultation - no rales, rhonchi or wheezes   CARDIOVASCULAR: regular rhythm, normal S1 with physiologic split S2  ABDOMEN:non tender,bowel sounds normal, aorta not enlarged by palpation, no abdominal bruits  EXTREMITIES: peripheral pulses normal, no edema, no bruits  NEURO: normal speech, gait and affect  VASC: pulses are normalNo bruits are heard.  SKIN: no ecchymoses, no rashes  Constitutional system no abnormalities    Labs:  CBC RESULTS:   Lab Results   Component Value Date    WBC 6.1 10/22/2018    RBC 4.66 10/22/2018    HGB 14.1 10/22/2018    HCT 42.1 10/22/2018    MCV 90 10/22/2018    MCH 30.3 10/22/2018    MCHC 33.5 10/22/2018    RDW 12.3 10/22/2018     (L) 10/22/2018       BMP RESULTS:  Lab Results   Component Value Date     01/29/2013    POTASSIUM 4.8 01/29/2013    CHLORIDE 102 01/29/2013    CO2 30 01/29/2013    ANIONGAP 11 01/29/2013     (H) 01/29/2013    BUN 13 01/29/2013    CR 0.92 01/29/2013    GFRESTIMATED >90 01/29/2013    GFRESTBLACK >90 01/29/2013    KOBY 9.2 01/29/2013    "     INR RESULTS:  Lab Results   Component Value Date    INR 1.14 10/22/2018       Procedures:  No echocardiogram in EMR      Assessment and Plan:   1. Remote solitary VVS......No recurrence  2.  Remains on low dose beta-blocker for surgical instrument control  3. Follow-up for reasons noted above approximately 2 years    RTC 2 year or earlier if circumstances warrant    I very much appreciated the opportunity to see and assess Dr Natalie Ny in the clinic today. Please do not hesitate to contact my office if you have any questions or concerns.      Javad Kirkland MD  Cardiac Arrhythmia Service  [facility]  [phone]      CC  Patient Care Team:  Jed Knox MD as PCP - General (Family Practice)  Jane Calvo MD as MD (Internal Medicine)  Pavithra Muñiz, RN as Specialty Care Coordinator (Cardiology)  REFERRING AVANI GUERRERO

## 2019-06-13 NOTE — PATIENT INSTRUCTIONS
You were seen in the Electrophysiology Clinic today by: Dr. Kirkland    Plan:       Follow up visit: Follow up in 2 years    Further Instructions:    Your Care Team:  EP Cardiology   Telephone Number     Pavithra Muñiz RN [phone]     For scheduling appts or procedures:    Sehila Osorio   [phone]   For the Device Clinic (Pacemakers, ICDs, Loop Recorders)    During business hours: [phone]  After business hours:   [phone]- select option 4 and ask for job code [phone].       Cardiovascular Clinic:   [address] [place] [address]. [place] [address]      As always, Thank you for trusting us with your health care needs!

## 2019-06-13 NOTE — LETTER
6/13/2019      RE: Natalie Ny  [address] [address]       Dear Colleague,    Thank you for the opportunity to participate in the care of your patient, Natalie Ny, at the [facility]. Please see a copy of my visit note below.    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    Continues to use for tremor control nadolol on days that he operates     ECG today WNL     No intercurrent illnesses of note    PAST MEDICAL HISTORY:  No past medical history on file.    CURRENT MEDICATIONS:  Current Outpatient Medications   Medication Sig Dispense Refill     albuterol (PROAIR HFA/PROVENTIL HFA/VENTOLIN HFA) 108 (90 BASE) MCG/ACT Inhaler Inhale 2 puffs into the lungs every 6 hours as needed for shortness of breath / dyspnea or wheezing 1 Inhaler 1     finasteride (PROPECIA) 1 MG tablet Take 1 tablet (1 mg) by mouth daily Please have annual check up with primary provider for further refills. 90 tablet 0     fluticasone (FLOVENT HFA) 220 MCG/ACT Inhaler Inhale 2 puffs into the lungs 2 times daily 1 Inhaler 3     levothyroxine (SYNTHROID/LEVOTHROID) 200 MCG tablet Take 1 tablet daily 90 tablet 0     nadolol (CORGARD) 20 MG tablet Take 1 tablet (20 mg) by mouth daily 93 tablet 0     ranitidine (ZANTAC) 150 MG tablet Take 150 mg by mouth as needed.         PAST SURGICAL HISTORY:  No past surgical history on file.    ALLERGIES:     Allergies   Allergen Reactions     Amoxicillin Rash     Penicillins Rash       SOCIAL HISTORY:  Social History     Tobacco Use     Smoking status: Never Smoker     Smokeless tobacco: Never Used   Substance  "Use Topics     Alcohol use: None     Drug use: None       ROS:   Constitutional: No fever, chills, or sweats. Weight stable.   ENT: No visual disturbance, ear ache, epistaxis, sore throat.   Cardiovascular: As per HPI.   Respiratory: No cough, hemoptysis.    GI: No nausea, vomiting, hematemesis, melena, or hematochezia.   : No hematuria.   Integument: Negative.   Psychiatric: Negative.   Hematologic: no easy bleeding.  Neuro: Negative.   Endocrinology: No significant heat or cold intolerance   Musculoskeletal: No myalgia.    Exam:  /78 (BP Location: Left arm, Patient Position: Chair, Cuff Size: Adult Regular)   Pulse 63   Ht 1.778 m (5' 10\")   Wt 88.6 kg (195 lb 6.4 oz)   SpO2 96%   BMI 28.04 kg/m    Repeat Blood Pressure:  BP Pulse Site Cuff Size Time Date   118/78 63 Left arm Adult Regular  4:39 PM 6/13/2019     Pain Information  Score Location Time Date   No Pain (0) ---  4:39 PM 6/13/2019   Comment: No SOB   No orthostatic vitals data filed.  No peak flow data filed.    GENERAL APPEARANCE: healthy appearance, alert and no distress  HEENT: no icterus, no xanthelasmas, normal pupil size  NECK: no adenopathy, no asymmetry, masses, or scars, thyroid normal JVP not elevated  RESPIRATORY: lungs clear to auscultation - no rales, rhonchi or wheezes   CARDIOVASCULAR: regular rhythm, normal S1 with physiologic split S2  ABDOMEN:non tender,bowel sounds normal, aorta not enlarged by palpation, no abdominal bruits  EXTREMITIES: peripheral pulses normal, no edema, no bruits  NEURO: normal speech, gait and affect  VASC: pulses are normalNo bruits are heard.  SKIN: no ecchymoses, no rashes  Constitutional system no abnormalities    Labs:  CBC RESULTS:   Lab Results   Component Value Date    WBC 6.1 10/22/2018    RBC 4.66 10/22/2018    HGB 14.1 10/22/2018    HCT 42.1 10/22/2018    MCV 90 10/22/2018    MCH 30.3 10/22/2018    MCHC 33.5 10/22/2018    RDW 12.3 10/22/2018     (L) 10/22/2018       BMP RESULTS:  Lab " Results   Component Value Date     01/29/2013    POTASSIUM 4.8 01/29/2013    CHLORIDE 102 01/29/2013    CO2 30 01/29/2013    ANIONGAP 11 01/29/2013     (H) 01/29/2013    BUN 13 01/29/2013    CR 0.92 01/29/2013    GFRESTIMATED >90 01/29/2013    GFRESTBLACK >90 01/29/2013    KOBY 9.2 01/29/2013        INR RESULTS:  Lab Results   Component Value Date    INR 1.14 10/22/2018       Procedures:  No echocardiogram in EMR      Assessment and Plan:   1. Remote solitary VVS......No recurrence  2.  Remains on low dose beta-blocker for surgical instrument control  3. Follow-up for reasons noted above approximately 2 years    RTC 2 year or earlier if circumstances warrant    I very much appreciated the opportunity to see and assess Dr Natalie Ny in the clinic today. Please do not hesitate to contact my office if you have any questions or concerns.      Javad Kirkland MD  Cardiac Arrhythmia Service  [facility]  [phone]      CC  Patient Care Team:  Jed Knox MD as PCP - General (Family Practice)  Jane Calvo MD as MD (Internal Medicine)  Pavithra Muñiz, RN as Specialty Care Coordinator (Cardiology)  REFERRING AVANI GUERRERO

## 2019-06-13 NOTE — NURSING NOTE
Chief Complaint   Patient presents with     New Patient     2-3 year f/up for single syncopal event     Vitals were taken, medications reconciled and EKG performed.    Bob Hoyos, AYLIN    4:49 PM

## 2019-06-14 LAB — INTERPRETATION ECG - MUSE: NORMAL

## 2019-07-16 ENCOUNTER — OFFICE VISIT (OUTPATIENT)
Dept: ENDOCRINOLOGY | Facility: CLINIC | Age: 49
End: 2019-07-16
Payer: COMMERCIAL

## 2019-07-16 VITALS
SYSTOLIC BLOOD PRESSURE: 130 MMHG | DIASTOLIC BLOOD PRESSURE: 88 MMHG | BODY MASS INDEX: 28.65 KG/M2 | WEIGHT: 199.7 LBS | HEART RATE: 59 BPM

## 2019-07-16 DIAGNOSIS — E03.4 HYPOTHYROIDISM DUE TO ACQUIRED ATROPHY OF THYROID: Primary | ICD-10-CM

## 2019-07-16 DIAGNOSIS — E03.4 HYPOTHYROIDISM DUE TO ACQUIRED ATROPHY OF THYROID: ICD-10-CM

## 2019-07-16 LAB — TSH SERPL DL<=0.005 MIU/L-ACNC: 1.05 MU/L (ref 0.4–4)

## 2019-07-16 ASSESSMENT — PAIN SCALES - GENERAL: PAINLEVEL: NO PAIN (0)

## 2019-07-16 NOTE — PROGRESS NOTES
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  He is status post radioactive iodine ablation in 2007, and has been on levothyroxine replacement ever since. He currently is on  200 daily.  He has no trouble taking his medication.  Today he feels well.  Energy level is good.  He has no heat or cold intolerance.  He denies palpitations.  Weight is stable.  He has no problems with his eyes.    Patient Active Problem List   Diagnosis     Grave's disease     Syncope and collapse       Current Outpatient Medications on File Prior to Visit:  albuterol (PROAIR HFA/PROVENTIL HFA/VENTOLIN HFA) 108 (90 BASE) MCG/ACT Inhaler Inhale 2 puffs into the lungs every 6 hours as needed for shortness of breath / dyspnea or wheezing   finasteride (PROPECIA) 1 MG tablet Take 1 tablet (1 mg) by mouth daily Please have annual check up with primary provider for further refills.   levothyroxine (SYNTHROID/LEVOTHROID) 200 MCG tablet Take 1 tablet daily   nadolol (CORGARD) 20 MG tablet Take 1 tablet (20 mg) by mouth daily   ranitidine (ZANTAC) 150 MG tablet Take 150 mg by mouth as needed.   fluticasone (FLOVENT HFA) 220 MCG/ACT Inhaler Inhale 2 puffs into the lungs 2 times daily (Patient not taking: Reported on 7/16/2019)     No current facility-administered medications on file prior to visit.     ROS: 10 point ROS neg other than the symptoms noted above in the HPI.  So Hx -  with a 3 year old and 2 year old twins    Vital signs:   /88   Pulse 59   Wt 90.6 kg (199 lb 11.2 oz)   BMI 28.65 kg/m    Estimated body mass index is 28.65 kg/m  as calculated from the following:    Height as of 6/13/19: 1.778 m (5' 10\").    Weight as of this encounter: 90.6 kg (199 lb 11.2 oz).    VSS  NAD  Eyes - no periorbital edema, conjunctival injection, scleral icterus  Neck - no thyromegaly  Lungs - clear  CV - RRR.  Normal S1, S2 w/o murmur or gallop.  No edema  Neuro .  DTR 2/4 biceps  Skin - normal texture   Feet - no ulcers   Mood - " not depressed    ENDO THYROID LABS-[facility] Latest Ref Rng & Units 10/22/2018 1/22/2018   TSH 0.40 - 4.00 mU/L 4.05 (H) 4.14 (H)   T4 FREE 0.76 - 1.46 ng/dL 1.33    TRIIODOTHYRONINE(T3) 60 - 181 ng/dL       ENDO THYROID LABS-[facility] Latest Ref Rng & Units 10/16/2017 10/19/2016   TSH 0.40 - 4.00 mU/L 31.86 (H) 0.42   T4 FREE 0.76 - 1.46 ng/dL 0.94    TRIIODOTHYRONINE(T3) 60 - 181 ng/dL       ENDO THYROID LABS-[facility] Latest Ref Rng & Units 12/22/2015   TSH 0.40 - 4.00 mU/L 1.26   T4 FREE 0.76 - 1.46 ng/dL    TRIIODOTHYRONINE(T3) 60 - 181 ng/dL        Assessment and plan:    Natalie  has been on thyroid hormone replacement for more than a decade.  Most recent thyroid function tests were just above target.  He reports he has been taking his medication daily.  He now has a pillbox.  I will repeat his thyroid function test today and adjust the dose as necessary.  If things are stable, I will plan to see him back in 1 year.    Jane Calvo MD

## 2019-07-16 NOTE — LETTER
"7/16/2019       RE: Natalie Ny  [address] [address]     Dear Colleague,    Thank you for referring your patient, Natalie Ny, to the [facility] ENDOCRINOLOGY at [facility]. Please see a copy of my visit note below.    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  He is status post radioactive iodine ablation in 2007, and has been on levothyroxine replacement ever since. He currently is on  200 daily.  He has no trouble taking his medication.  Today he feels well.  Energy level is good.  He has no heat or cold intolerance.  He denies palpitations.  Weight is stable.  He has no problems with his eyes.    Patient Active Problem List   Diagnosis     Grave's disease     Syncope and collapse       Current Outpatient Medications on File Prior to Visit:  albuterol (PROAIR HFA/PROVENTIL HFA/VENTOLIN HFA) 108 (90 BASE) MCG/ACT Inhaler Inhale 2 puffs into the lungs every 6 hours as needed for shortness of breath / dyspnea or wheezing   finasteride (PROPECIA) 1 MG tablet Take 1 tablet (1 mg) by mouth daily Please have annual check up with primary provider for further refills.   levothyroxine (SYNTHROID/LEVOTHROID) 200 MCG tablet Take 1 tablet daily   nadolol (CORGARD) 20 MG tablet Take 1 tablet (20 mg) by mouth daily   ranitidine (ZANTAC) 150 MG tablet Take 150 mg by mouth as needed.   fluticasone (FLOVENT HFA) 220 MCG/ACT Inhaler Inhale 2 puffs into the lungs 2 times daily (Patient not taking: Reported on 7/16/2019)     No current facility-administered medications on file prior to visit.     ROS: 10 point ROS neg other than the symptoms noted above in the HPI.  So Hx -  with a 3 year old and 2 year old twins    Vital signs:   /88   Pulse 59   Wt 90.6 kg (199 lb 11.2 oz)   BMI 28.65 kg/m     Estimated body mass index is 28.65 kg/m  as calculated from the following:    Height as of 6/13/19: 1.778 m (5' 10\").    Weight as of this " encounter: 90.6 kg (199 lb 11.2 oz).    VSS  NAD  Eyes - no periorbital edema, conjunctival injection, scleral icterus  Neck - no thyromegaly  Lungs - clear  CV - RRR.  Normal S1, S2 w/o murmur or gallop.  No edema  Neuro .  DTR 2/4 biceps  Skin - normal texture   Feet - no ulcers   Mood - not depressed    ENDO THYROID LABS-[facility] Latest Ref Rng & Units 10/22/2018 1/22/2018   TSH 0.40 - 4.00 mU/L 4.05 (H) 4.14 (H)   T4 FREE 0.76 - 1.46 ng/dL 1.33    TRIIODOTHYRONINE(T3) 60 - 181 ng/dL       ENDO THYROID LABS-[facility] Latest Ref Rng & Units 10/16/2017 10/19/2016   TSH 0.40 - 4.00 mU/L 31.86 (H) 0.42   T4 FREE 0.76 - 1.46 ng/dL 0.94    TRIIODOTHYRONINE(T3) 60 - 181 ng/dL       ENDO THYROID LABS-[facility] Latest Ref Rng & Units 12/22/2015   TSH 0.40 - 4.00 mU/L 1.26   T4 FREE 0.76 - 1.46 ng/dL    TRIIODOTHYRONINE(T3) 60 - 181 ng/dL        Assessment and plan:    Natalie  has been on thyroid hormone replacement for more than a decade.  Most recent thyroid function tests were just above target.  He reports he has been taking his medication daily.  He now has a pillbox.  I will repeat his thyroid function test today and adjust the dose as necessary.  If things are stable, I will plan to see him back in 1 year.    Jane Calvo MD

## 2019-07-24 ENCOUNTER — MYC MEDICAL ADVICE (OUTPATIENT)
Dept: DERMATOLOGY | Facility: CLINIC | Age: 49
End: 2019-07-24

## 2019-07-25 DIAGNOSIS — R55 SYNCOPE, UNSPECIFIED SYNCOPE TYPE: ICD-10-CM

## 2019-07-27 RX ORDER — NADOLOL 20 MG/1
20 TABLET ORAL DAILY
Qty: 90 TABLET | Refills: 3 | Status: SHIPPED | OUTPATIENT
Start: 2019-07-27 | End: 2020-07-01

## 2019-07-27 NOTE — TELEPHONE ENCOUNTER
Last Clinic Visit: 6/13/19    High med alert    Warnings Override History for nadolol (CORGARD) 20 MG tablet [[id number]]     Overridden by Vy Emery RN on Apr 30, 2019 2:56 PM   Drug-Drug   1. BETA-ADRENERGIC BLOCKERS / SYMPATHOMIMETICS [Level: Major] [Reason: RN Reviewed]   Comment: Previous overrides by cardiology   Other Orders: albuterol (PROAIR HFA/PROVENTIL HFA/VENTOLIN HFA) 108 (90 BASE) MCG/ACT Inhaler

## 2019-08-01 DIAGNOSIS — E03.9 HYPOTHYROIDISM: ICD-10-CM

## 2019-08-01 RX ORDER — LEVOTHYROXINE SODIUM 200 UG/1
200 TABLET ORAL DAILY
Qty: 90 TABLET | Refills: 3 | Status: SHIPPED | OUTPATIENT
Start: 2019-08-01 | End: 2020-07-02

## 2019-08-07 ENCOUNTER — OFFICE VISIT (OUTPATIENT)
Dept: DERMATOLOGY | Facility: CLINIC | Age: 49
End: 2019-08-07
Payer: COMMERCIAL

## 2019-08-07 DIAGNOSIS — L60.3 NAIL DYSTROPHY: Primary | ICD-10-CM

## 2019-08-07 DIAGNOSIS — B35.1 ONYCHOMYCOSIS: ICD-10-CM

## 2019-08-07 PROCEDURE — 99202 OFFICE O/P NEW SF 15 MIN: CPT | Performed by: DERMATOLOGY

## 2019-08-07 PROCEDURE — 88313 SPECIAL STAINS GROUP 2: CPT | Performed by: DERMATOLOGY

## 2019-08-07 PROCEDURE — 88304 TISSUE EXAM BY PATHOLOGIST: CPT | Mod: TC | Performed by: DERMATOLOGY

## 2019-08-07 ASSESSMENT — PAIN SCALES - GENERAL: PAINLEVEL: NO PAIN (0)

## 2019-08-07 NOTE — LETTER
"    8/7/2019         RE: Natalie Ny  [address] [address]        Dear Colleague,    Thank you for referring your patient, Natalie Ny, to the [facility]. Please see a copy of my visit note below.    [facility] Dermatology Note      Dermatology Problem List:  1.Onychomycosis vs traumatic changes to the toenails  - left great toe, s/p nail clipping 8/7/19    Encounter Date: Aug 7, 2019    CC:  Chief Complaint   Patient presents with     Toenail     Natalie is visiting to discuss toenail is discolored and \"loose\"         History of Present Illness:  Mr. Natalie Ny is a 48 year old male who presents in self referral for a loose and discolored left great toenail. It has been this way for about a month, and he thinks he may have injured it. When this began, the nail was very discolored but still full length but then progressed to be loose and short. He has never had problems with the nails before. He does not have history of athlete's foot or onychomycosis to his knowledge. No other concerns addressed today.      Past Medical History:   Patient Active Problem List   Diagnosis     Grave's disease     Syncope and collapse     No past medical history on file.  No past surgical history on file.    Social History:  Patient is an ophthalomologist at the , retina specialist     Family History:  No family history of skin cancer      Medications:  Current Outpatient Medications   Medication Sig Dispense Refill     albuterol (PROAIR HFA/PROVENTIL HFA/VENTOLIN HFA) 108 (90 BASE) MCG/ACT Inhaler Inhale 2 puffs into the lungs every 6 hours as needed for shortness of breath / dyspnea or wheezing 1 Inhaler 1     finasteride (PROPECIA) 1 MG tablet Take 1 tablet (1 mg) by mouth daily Please have annual check up with primary provider for further refills. 90 tablet 0     fluticasone (FLOVENT HFA) 220 MCG/ACT Inhaler Inhale 2 puffs into the lungs 2 times daily (Patient " not taking: Reported on 7/16/2019) 1 Inhaler 3     levothyroxine (SYNTHROID/LEVOTHROID) 200 MCG tablet Take 1 tablet (200 mcg) by mouth daily 90 tablet 3     nadolol (CORGARD) 20 MG tablet Take 1 tablet (20 mg) by mouth daily 90 tablet 3     ranitidine (ZANTAC) 150 MG tablet Take 150 mg by mouth as needed.         Allergies   Allergen Reactions     Amoxicillin Rash     Penicillins Rash       Review of Systems:  -Constitutional: Patient is otherwise feeling well, in usual state of health.   -Skin: As above in HPI. No additional skin concerns.    Physical exam:  Vitals: There were no vitals taken for this visit.  GEN: This is a well developed, well-nourished male in no acute distress, in a pleasant mood.    SKIN: Focused examination of the feet, hands, head/neck was performed.  - Carpio Type II  - left great toenail thickened, off whit,e and significant subungual debris and distal onycholysis   - right great toenail: distal superficial onycholysis   - left and right 5th toenails: off white and slightly thickened  - scaling at the bilateral heels  - no maceration in the toe web spaces   - no other lesions of concern on areas examined.       Impression/Plan:    1. Nail dystrophy - Onychomycosis vs traumatic changes to the toenails. Due to the discoloration of both great toes and scaly heals, a sample is needed to determine if changes are due to onychomycosis. I suspect it will show onychomycosis. Discussed treatment with oral terbinafine with a 12 week course. Discussed side effects of terbinafine in detail including taste changes and liver damage. Discussed need for baseline labs and labs at half way point during treatment. Discussed need to avoid/minimize alcohol intake during treatment course. Discussed that it will take around a year after treatment to see full effects.     Nail clipping was taken for pathology (PAS).     Will call patient with results.       Follow-up PRN.       Staff  Involved:  Scribe/Staff    Scribe Disclosure  I, Katrina Vicente, am serving as a scribe to document services personally performed by Dr. Galilea Bliss MD, based on data collection and the provider's statements to me.     Provider Disclosure:   The documentation recorded by the scribe accurately reflects the services I personally performed and the decisions made by me.    Galilea Bliss MD    Department of Dermatology  [facility]: Phone: [phone], Fax:[phone]  [facility]: Phone: [phone], Fax: [phone]              Again, thank you for allowing me to participate in the care of your patient.        Sincerely,        Galilea Bliss MD

## 2019-08-07 NOTE — NURSING NOTE
"Natalie Ny's goals for this visit include:   Chief Complaint   Patient presents with     Toenail     Natalie is visiting to discuss toenail is discolored and \"loose\"     He requests these members of his care team be copied on today's visit information:     PCP: Jed Knox    Referring Provider:  Referred Self, MD  No address on file    There were no vitals taken for this visit.    Do you need any medication refills at today's visit? No    Mamta Constantino LPN    "

## 2019-08-07 NOTE — PROGRESS NOTES
"[facility] Dermatology Note      Dermatology Problem List:  1.Onychomycosis vs traumatic changes to the toenails  - left great toe, s/p nail clipping 8/7/19    Encounter Date: Aug 7, 2019    CC:  Chief Complaint   Patient presents with     Toenail     Natalie is visiting to discuss toenail is discolored and \"loose\"         History of Present Illness:  Mr. Natalie Ny is a 48 year old male who presents in self referral for a loose and discolored left great toenail. It has been this way for about a month, and he thinks he may have injured it. When this began, the nail was very discolored but still full length but then progressed to be loose and short. He has never had problems with the nails before. He does not have history of athlete's foot or onychomycosis to his knowledge. No other concerns addressed today.      Past Medical History:   Patient Active Problem List   Diagnosis     Grave's disease     Syncope and collapse     No past medical history on file.  No past surgical history on file.    Social History:  Patient is an ophthalomologist at the , retina specialist     Family History:  No family history of skin cancer      Medications:  Current Outpatient Medications   Medication Sig Dispense Refill     albuterol (PROAIR HFA/PROVENTIL HFA/VENTOLIN HFA) 108 (90 BASE) MCG/ACT Inhaler Inhale 2 puffs into the lungs every 6 hours as needed for shortness of breath / dyspnea or wheezing 1 Inhaler 1     finasteride (PROPECIA) 1 MG tablet Take 1 tablet (1 mg) by mouth daily Please have annual check up with primary provider for further refills. 90 tablet 0     fluticasone (FLOVENT HFA) 220 MCG/ACT Inhaler Inhale 2 puffs into the lungs 2 times daily (Patient not taking: Reported on 7/16/2019) 1 Inhaler 3     levothyroxine (SYNTHROID/LEVOTHROID) 200 MCG tablet Take 1 tablet (200 mcg) by mouth daily 90 tablet 3     nadolol (CORGARD) 20 MG tablet Take 1 tablet (20 mg) by mouth daily 90 tablet 3     " ranitidine (ZANTAC) 150 MG tablet Take 150 mg by mouth as needed.         Allergies   Allergen Reactions     Amoxicillin Rash     Penicillins Rash       Review of Systems:  -Constitutional: Patient is otherwise feeling well, in usual state of health.   -Skin: As above in HPI. No additional skin concerns.    Physical exam:  Vitals: There were no vitals taken for this visit.  GEN: This is a well developed, well-nourished male in no acute distress, in a pleasant mood.    SKIN: Focused examination of the feet, hands, head/neck was performed.  - Carpio Type II  - left great toenail thickened, off whit,e and significant subungual debris and distal onycholysis   - right great toenail: distal superficial onycholysis   - left and right 5th toenails: off white and slightly thickened  - scaling at the bilateral heels  - no maceration in the toe web spaces   - no other lesions of concern on areas examined.       Impression/Plan:    1. Nail dystrophy - Onychomycosis vs traumatic changes to the toenails. Due to the discoloration of both great toes and scaly heals, a sample is needed to determine if changes are due to onychomycosis. I suspect it will show onychomycosis. Discussed treatment with oral terbinafine with a 12 week course. Discussed side effects of terbinafine in detail including taste changes and liver damage. Discussed need for baseline labs and labs at half way point during treatment. Discussed need to avoid/minimize alcohol intake during treatment course. Discussed that it will take around a year after treatment to see full effects.     Nail clipping was taken for pathology (PAS).     Will call patient with results.       Follow-up PRN.       Staff Involved:  Scribe/Staff    Scribe Disclosure  I, Katrina Vicente, am serving as a scribe to document services personally performed by Dr. Galilea Bliss MD, based on data collection and the provider's statements to me.     Provider Disclosure:   The documentation  recorded by the scribe accurately reflects the services I personally performed and the decisions made by me.    Galilea Bliss MD    Department of Dermatology  [facility]: Phone: [phone], Fax:[phone]  [facility]: Phone: [phone], Fax: [phone]

## 2019-08-07 NOTE — LETTER
August 29, 2019        Mr. Jody Berrios  [address] [address]        Dear Mr. Berrios,    We are writing to inform you of your test results.  We attempted to reach you by phone and MyChart.    The clipping did show a fungus.     Since you have so few nails affected, you have a real chance of clearing if we treat now. Sometimes things are too far gone and the medication is not helpful. If you would like to treat, the medication would be oral terbinafine 250mg daily for 12 weeks. The risks of this medication include liver injury, blood count abnormalities, metallic taste, and loss of taste. We check baseline liver enzymes and CBC to ensure there are no problems before starting the medication. Liver injury is almost always reversible if caught early, so we recheck labs shelter through treatment (6 weeks) to ensure no changes have occurred. You should limit alcohol while on the medication to no more than 1-2 drinks per week to reduce other potential causes of liver injury.       Please call my nurse at [phone] if you would like to start treatment.  She can order the baseline lab work.    Resulted Orders   Dermatological path order and indications   Result Value Ref Range    Copath Report       Patient Name: JODY BERRIOS  MR#: [id number]  Specimen #: [id number]  Collected: 8/7/2019  Received: 8/7/2019  Reported: 8/14/2019 18:55  Ordering Phy(s): DAVID ZHANG    For improved result formatting, select 'View Enhanced Report Format' under   Linked Documents section.    SPECIMEN(S):  Left great toenail    FINAL DIAGNOSIS:  Nail, left great toenail:  - Onychomycosis - (see description)    I have personally reviewed all specimens  and/or slides, including the   listed special stains, and used them  with my medical judgement to determine or confirm the final diagnosis.    Electronically signed out by:  Syed Sanches M.D., PhD, Physicians    CLINICAL HISTORY:  The patient is a  "48-year-old male    GROSS:  The specimen is received in formalin with proper patient identification,   labeled \"L great toenail\".  The  specimen consists of 2 tan yellow, rigid nail fragments measuring 0.6 and   1.1 cm in greatest dimension. The  specimen is wrapped and entirely submitted in A1. (D ictated by: HOLLIE Llanos 8/8/2019 10:08 AM)    MICROSCOPIC:  The specimen consists of nailplate fragments with scale crust and PAS   positive hyphae.    The technical component of this testing was completed at the [facility], with the professional component performed   at the [facility], [address] [place] [id number] ([phone])    CPT Codes:  A: 91966-XZ6.P, 07734-VD1.T, 43981-INM, 44898-PMH <CR>, 88799-MEVY    COLLECTION SITE:  Client: [facility]  Location: [facility])         Sincerely,        Galilea Bliss MD      Department of Dermatology   [facility]: Phone: [phone], Fax:[phone]   [facility]: Phone: [phone], Fax: [phone]       "

## 2019-08-14 LAB — COPATH REPORT: NORMAL

## 2019-08-29 ENCOUNTER — TELEPHONE (OUTPATIENT)
Dept: DERMATOLOGY | Facility: CLINIC | Age: 49
End: 2019-08-29

## 2020-02-24 ENCOUNTER — HEALTH MAINTENANCE LETTER (OUTPATIENT)
Age: 50
End: 2020-02-24

## 2020-03-19 NOTE — TELEPHONE ENCOUNTER
RECORDS RECEIVED FROM: Internal   DATE RECEIVED: 6-13   NOTES STATUS DETAILS   OFFICE NOTE from referring provider    N/A    OFFICE NOTE from other cardiologists   and neurologists N/A    DISCHARGE SUMMARY from hospital    N/A    DISCHARGE REPORT from the ER   N/A    OPERATIVE REPORT    N/A    MEDICATION LIST   Internal    LABS     BMP   N/A    CBC   Internal 10-22-18   CMP   N/A    Lipids   N/A    TSH   Internal 1-22-18   DIAGNOSTIC PROCEDURES     EKG  Strips *important N/A    Monitor Reports  Strips *important N/A    Cardioversions   N/A    ICD/pacemaker implant       Tilt table studies   N/A    IMAGING (DISC & REPORT)      ECHO's   N/A    Stress Tests   N/A    Cath   N/A    CT/CTA   N/A    MRI/MRA   N/A      
Quality 130: Documentation Of Current Medications In The Medical Record: Current Medications Documented
Quality 226: Preventive Care And Screening: Tobacco Use: Screening And Cessation Intervention: Patient screened for tobacco use and is an ex/non-smoker
Detail Level: Detailed

## 2020-04-17 DIAGNOSIS — L65.9 HAIR LOSS: ICD-10-CM

## 2020-04-17 DIAGNOSIS — E78.00 HIGH BLOOD CHOLESTEROL: ICD-10-CM

## 2020-04-17 DIAGNOSIS — E03.9 ACQUIRED HYPOTHYROIDISM: ICD-10-CM

## 2020-04-20 NOTE — TELEPHONE ENCOUNTER
finasteride (PROPECIA) 1 MG tablet  Last Written Prescription Date:  4/16/19  Last Fill Quantity: 90,   # refills: 0  Last Office Visit : 2/7/19  Future Office visit:  None    Scheduling has been notified to contact the pt for appointment.    Routing refill request to provider for review/approval because:   Miscellaneous Dermatologic Agents Failed   Recent (12 mo) or future (30 days) visit within the authorizing provider's specialty Protocol Details    Refill request is not for Imiquimod, 5-Fluorouracil, or Finasteride

## 2020-04-21 RX ORDER — FINASTERIDE 1 MG/1
1 TABLET, FILM COATED ORAL DAILY
Qty: 90 TABLET | Refills: 0 | Status: SHIPPED | OUTPATIENT
Start: 2020-04-21 | End: 2020-07-14

## 2020-06-11 DIAGNOSIS — E03.9 HYPOTHYROIDISM: ICD-10-CM

## 2020-06-12 RX ORDER — LEVOTHYROXINE SODIUM 200 UG/1
200 TABLET ORAL DAILY
Qty: 90 TABLET | Refills: 3 | OUTPATIENT
Start: 2020-06-12

## 2020-06-28 DIAGNOSIS — R55 SYNCOPE, UNSPECIFIED SYNCOPE TYPE: ICD-10-CM

## 2020-06-30 DIAGNOSIS — E03.9 HYPOTHYROIDISM: ICD-10-CM

## 2020-07-01 RX ORDER — NADOLOL 20 MG/1
20 TABLET ORAL DAILY
Qty: 90 TABLET | Refills: 0 | Status: SHIPPED | OUTPATIENT
Start: 2020-07-01 | End: 2020-09-25

## 2020-07-01 NOTE — TELEPHONE ENCOUNTER
Last Clinic Visit: 6/13/2019  [facility]  Scheduling has been notified to contact the pt for appointment.

## 2020-07-01 NOTE — TELEPHONE ENCOUNTER
Overridden by Lakesha Vann RN on May 14, 2018 11:42 AM    Drug-Drug    1. BETA-ADRENERGIC BLOCKERS / SYMPATHOMIMETICS [Level: Major]    Other Orders:  nadolol (CORGARD) 20 MG tablet

## 2020-07-02 RX ORDER — LEVOTHYROXINE SODIUM 200 UG/1
200 TABLET ORAL DAILY
Qty: 90 TABLET | Refills: 0 | Status: SHIPPED | OUTPATIENT
Start: 2020-07-02 | End: 2020-08-13

## 2020-07-10 DIAGNOSIS — L65.9 HAIR LOSS: ICD-10-CM

## 2020-07-10 DIAGNOSIS — E03.9 ACQUIRED HYPOTHYROIDISM: ICD-10-CM

## 2020-07-10 DIAGNOSIS — E78.00 HIGH BLOOD CHOLESTEROL: ICD-10-CM

## 2020-07-14 RX ORDER — FINASTERIDE 1 MG/1
1 TABLET, FILM COATED ORAL DAILY
Qty: 30 TABLET | Refills: 0 | Status: SHIPPED | OUTPATIENT
Start: 2020-07-14 | End: 2020-07-27

## 2020-07-14 NOTE — TELEPHONE ENCOUNTER
finasteride (PROPECIA) 1 MG tablet   Take 1 tablet (1 mg) by mouth daily      Last Written Prescription Date:  4/21/20  Last Fill Quantity: 90,   # refills: 0  Last Office Visit : 2/7/19  Future Office visit:  none    Routing refill request to provider for review/approval because:  Failed protocol - if medication is Imiquimod, 5-Fluorouracil, or Finasteride refer to provider for refill.     Overdue office visit - Scheduling has been notified to contact the pt for appointment.

## 2020-07-20 ENCOUNTER — MYC MEDICAL ADVICE (OUTPATIENT)
Dept: FAMILY MEDICINE | Facility: CLINIC | Age: 50
End: 2020-07-20

## 2020-07-20 DIAGNOSIS — M25.539 WRIST PAIN: ICD-10-CM

## 2020-07-20 DIAGNOSIS — E03.9 ACQUIRED HYPOTHYROIDISM: ICD-10-CM

## 2020-07-20 DIAGNOSIS — K64.9 HEMORRHOIDS, UNSPECIFIED HEMORRHOID TYPE: Primary | ICD-10-CM

## 2020-07-20 DIAGNOSIS — L65.9 HAIR LOSS: ICD-10-CM

## 2020-07-20 DIAGNOSIS — E78.00 HIGH BLOOD CHOLESTEROL: ICD-10-CM

## 2020-07-22 NOTE — PATIENT INSTRUCTIONS
[facility] Teledermatology Visit    Thank you for allowing us to participate in your care. Your findings, instructions and follow-up plan are as follows:    1. Onychomycosis - confirmed via nail clipping and positive PAS 8/7/19  - Discussed treatment with oral terbinafine with a 12 week course. Discussed side effects of terbinafine in detail including taste changes and liver damage. Discussed need for baseline labs and labs at half way point during treatment. Discussed need to avoid/minimize alcohol intake during treatment course. Discussed that it will take around a year after treatment to see full effects.   -Baseline labs ordered- AST/ALT, plan to repeat in 6 weeks  -Start terbinafine 250mg po every day x 12 weeks  -Advised to keep nails trimmed short    When should I call my doctor?    If you are worsening or not improving, please, contact us or seek urgent care as noted below.     Who should I call with questions (adults)?    [facility] (adult and pediatric): [phone]     [facility] (adult): [phone]    For urgent needs outside of business hours call the [facility] at [phone] and ask for the dermatology resident on call    If this is a medical emergency and you are unable to reach an ER, Call [phone]      Who should I call with questions (pediatric)?  [facility]- Pediatric Dermatology  Dr. Mckenna Leonardo, Dr. Makayla Bocanegra, Dr. Inga Ornelas, Karen Belcher, PA  Dr. Felecia Gustafson, Dr. Amalia Kurtz & Dr. Javad Zaragoza  Non Urgent  Nurse Triage Line; [phone]- Gina and Hina SCHNEIDER Care Coordinators   Kailey (/Complex ) [phone]    If you need a prescription refill, please contact your pharmacy. Refills are approved or denied by our Physicians during normal business hours, Monday through Fridays  Per office policy, refills will not be granted if you  have not been seen within the past year (or sooner depending on your child's condition)    Scheduling Information:  Pediatric Appointment Scheduling and Call Center [phone]  Radiology Scheduling- [phone]  Sedation Unit Scheduling- [phone]  [place] Scheduling- General [phone]; Pediatric Dermatology [phone]  Main  Services: [phone]  English: [phone]  Ugandan: [phone]  Hmong/Palestinian/Panamanian: [phone]  Preadmission Nursing Department Fax Number: [phone] (Fax all pre-operative paperwork to this number)    For urgent matters arising during evenings, weekends, or holidays that cannot wait for normal business hours please call [phone] and ask for the Dermatology Resident On-Call to be paged.

## 2020-07-22 NOTE — TELEPHONE ENCOUNTER
RECORDS RECEIVED FROM: [facility]- Dr. Jed Knox    DATE RECEIVED: 8/20/2020   NOTES STATUS DETAILS   OFFICE NOTE from referring provider  Internal 7/20/2020 Office visit with Dr. Knox    OFFICE NOTE from other specialist   N/A    DISCHARGE SUMMARY from hospital  N/A    DISCHARGE REPORT from the ER N/A    OPERATIVE REPORT  Care Everywhere    MEDICATION LIST Internal    LABS     PFC TESTING N/A    ANAL PAP N/A    BIOPSIES/PATHOLOGY RELATED TO DIAGNOSIS N/A    DIAGNOSTIC PROCEDURES     [facility]    UPPER ENDOSCOPY (EGD) N/A    FLEX SIGMOIDOSCOPY  N/A    ERCP N/A    IMAGING (DISC & REPORT)      CT  N/A    MRI N/A    XRAY N/A    ULTRASOUND (ENDOANAL/ENDORECTAL) N/A

## 2020-07-22 NOTE — PROGRESS NOTES
[facility]:  Invitation sent via [employer]    Impression and Recommendations (Patient Counseled on the Following):  1. Onychomycosis - confirmed via nail clipping and positive PAS 8/7/19  - Discussed treatment with oral terbinafine with a 12 week course. Discussed side effects of terbinafine in detail including taste changes and liver damage. Discussed need for baseline labs and labs at half way point during treatment. Discussed need to avoid/minimize alcohol intake during treatment course. Discussed that it will take around a year after treatment to see full effects.   -Baseline labs ordered- AST/ALT, plan to repeat in 6 weeks  -Start terbinafine 250mg po every day x 12 weeks  -Advised to keep nails trimmed short    Follow-up:   Follow-up with dermatology in approximately 12 weeks. Earlier for new or changing lesions or rash.   CC Dr. Brown on close of this encounter.     Staff only    All risks, benefits and alternatives were discussed with patient.  Patient is in agreement and understands the assessment and plan.  All questions were answered.    Nidia Agarwal PA-C, MPAS  [facility] [place]: Phone: [phone], Fax: [phone]  [facility]: Phone: [phone],  Fax: [phone]  ____________________________________________________________________________    Dermatology Problem List:  1.Onychomycosis vs traumatic changes to the toenails  - left great toe, s/p nail clipping 8/7/19  -terbinafine 250mg po every day x 12 weeks initiated 7/29/20  -baseline LFTs ordered 7/29/20    Encounter Date: Jul 22, 2020    CC:   Chief Complaint   Patient presents with     Derm Problem       History of Present Illness:  I have reviewed the teledermatology information and the nursing intake corresponding to this issue. Natalie Ny is a 49 year old male who presents via teledermatology for onychomycosis follow-up. Notes that  since last year he has not noticed improvement in his toenails. He had a nail clipping done about 1 year about with Dr. Bliss which was positive for fungus. States nails initially seemed to be improving but it became clear this spring that it hasn't really changed and is just as bad as ever; Covid then happened and so he had to push his toenail issue to the back burner. He is finally bothered enough by the appearance that he is interested in trying a systemic anti fungal drug. He is feeling well today. No major medical problems aside from listed in his chart. He is an eye doctor.     ROS: Patient is generally feeling well today  -Constitutional: no unintended weight loss/gain, no night sweats or fevers  -GI: no nausea, abdominal pain, vomiting, diarrhea or blood in the stool  -Skin: as per HPI    Physical Examination:  General: Well-appearing, appropriately-developed individual.  Skin: Focused examination within the teledermatology photograph(s) including face and bilateral dorsal feet and toenails was performed.   -L great toenail with discoloration and thickened hyperkeratosis, appears to possibly effect the second toenail as well    Labs:  LFTs ordered, will repeat in 6 weeks    Past Medical History:   Patient Active Problem List   Diagnosis     Grave's disease     Syncope and collapse     No past medical history on file.  No past surgical history on file.    Social History:  Patient reports that he has never smoked. He has never used smokeless tobacco.     Patient is an opthamologist.    Family History:  No family history on file.    Medications:  Current Outpatient Medications   Medication     albuterol (PROAIR HFA/PROVENTIL HFA/VENTOLIN HFA) 108 (90 BASE) MCG/ACT Inhaler     finasteride (PROPECIA) 1 MG tablet     fluticasone (FLOVENT HFA) 220 MCG/ACT Inhaler     levothyroxine (SYNTHROID/LEVOTHROID) 200 MCG tablet     nadolol (CORGARD) 20 MG tablet     ranitidine (ZANTAC) 150 MG tablet     No current  "facility-administered medications for this visit.      Allergies   Allergen Reactions     Amoxicillin Rash     Penicillins Rash   ___________________________________________________________________________    Teledermatology information:  - Location of patient: [place]  - Patient presented as: return  - Location of teledermatologist:  ([facility] )  - Reason teledermatology is appropriate:  of National Emergency Regarding Coronavirus disease (COVID 19) Outbreak  - Image quality and interpretability: acceptable  - Physician has received verbal consent for a Video/Photos Visit from the patient? Yes  - In-person dermatology visit recommendation: no  - Date of images: 7/20/20  - Length of call: 6min  - Date of report: 7/29/2020     Teledermatology Nurse Call for RETURN patients seen within the last 3 years:      The patient was contacted by phone and we reviewed they have a visit in teledermatology upcoming with an MD or PA-C;  Importantly, \"a teledermatology visit may not be as thorough as an in-person visit, and the quality of the photograph and/or video sent may not be of the same quality as that taken by the dermatology clinic.\"     We have found that certain health care needs can be provided without the need for an in-person physical exam.   If a prescription is necessary we can send it directly to your pharmacy.  If lab work is needed we can place an order for that and you can then stop by our lab to have the test done at a later time.If during the course of the call the physician/provider feels a video visit is not appropriate, you will not be charged for this service.Visits are billed at different rates depending on your insurance coverage. Please reach out to your insurance provider with any questions.    The patient chose to:                                                                                                                                                                         "                                            Consent to a teledermatology visit with [employer] photos. Patient told by nursing these are already uploaded. Instructions sent to patient via [employer]. They verified they will be in the state of [place] at the time of the encounter.                                                                                                                                                                                                                                 The patient will send photographs via [employer] for review. Instructions for photography are being sent to the patient via [employer] messaging.       The patient verified the location of the photo/video visit to be [place].(The physician must be notified by nurse if the patient is not in the state of [place] during the encounter)    The patient denied skin pain, fever, mucosal symptoms(lesions, blisters, sores in the mouth, nose, eyes, or genitals)  IF PATIENT ENDORSES ANY OF THESE STOP AND PAGE ON CALL ATTENDING      Natalie Ny's goals for this visit include:   Chief Complaint   Patient presents with     Derm Problem     He requests these members of his care team be copied on today's visit information: Yes    PCP: Jed Knox    Referring Provider:  No referring provider defined for this encounter.    There were no vitals taken for this visit.    Do you need any medication refills at today's visit? No    -Patient is cautious about previous medications recommended to him for treatment. Patient was uploaded this morning under [employer] message originally dated 7/20/2020.    Rosanne Sow LPN

## 2020-07-27 RX ORDER — FINASTERIDE 1 MG/1
1 TABLET, FILM COATED ORAL DAILY
Qty: 90 TABLET | Refills: 0 | Status: SHIPPED | OUTPATIENT
Start: 2020-07-27 | End: 2020-10-19

## 2020-07-29 ENCOUNTER — VIRTUAL VISIT (OUTPATIENT)
Dept: DERMATOLOGY | Facility: CLINIC | Age: 50
End: 2020-07-29
Payer: COMMERCIAL

## 2020-07-29 ENCOUNTER — TELEPHONE (OUTPATIENT)
Dept: DERMATOLOGY | Facility: CLINIC | Age: 50
End: 2020-07-29

## 2020-07-29 DIAGNOSIS — B35.1 ONYCHOMYCOSIS: ICD-10-CM

## 2020-07-29 DIAGNOSIS — Z79.899 MEDICATION MANAGEMENT: Primary | ICD-10-CM

## 2020-07-29 PROCEDURE — 99213 OFFICE O/P EST LOW 20 MIN: CPT | Mod: TEL | Performed by: PHYSICIAN ASSISTANT

## 2020-07-29 RX ORDER — TERBINAFINE HYDROCHLORIDE 250 MG/1
250 TABLET ORAL DAILY
Qty: 84 TABLET | Refills: 0 | Status: SHIPPED | OUTPATIENT
Start: 2020-07-29 | End: 2022-05-23

## 2020-07-29 NOTE — PROGRESS NOTES
7/29 Provided phone number [phone] to schedule a 12 week telephone visit around 9/9 with Suze Tran   Procedure    Ortho/Sports Med/Pod/Ent/Eye/Surgical Specialties  [facility] [place]   [phone]

## 2020-07-29 NOTE — TELEPHONE ENCOUNTER
7/29 Provided phone number [phone] to schedule in about 3 months (around 10/29/2020).    Nelida Tran   Procedure    Ortho/Sports Med/Pod/Ent/Eye/Surgical Specialties  [facility] [place]   [phone]

## 2020-07-29 NOTE — LETTER
7/29/2020         RE: Natalie Ny  [address] [address]        Dear Colleague,    Thank you for referring your patient, Natalie Ny, to the [facility]. Please see a copy of my visit note below.    [facility]:  Invitation sent via Ruth Kunstad[employer] â€“ [employer]    Impression and Recommendations (Patient Counseled on the Following):  1. Onychomycosis - confirmed via nail clipping and positive PAS 8/7/19  - Discussed treatment with oral terbinafine with a 12 week course. Discussed side effects of terbinafine in detail including taste changes and liver damage. Discussed need for baseline labs and labs at half way point during treatment. Discussed need to avoid/minimize alcohol intake during treatment course. Discussed that it will take around a year after treatment to see full effects.   -Baseline labs ordered- AST/ALT, plan to repeat in 6 weeks  -Start terbinafine 250mg po every day x 12 weeks  -Advised to keep nails trimmed short    Follow-up:   Follow-up with dermatology in approximately 12 weeks. Earlier for new or changing lesions or rash.   CC Dr. Brown on close of this encounter.     Staff only    All risks, benefits and alternatives were discussed with patient.  Patient is in agreement and understands the assessment and plan.  All questions were answered.    Nidia Agarwal PA-C, MPAS  [facility] [place]: Phone: [phone], Fax: [phone]  [facility]: Phone: [phone],  Fax: [phone]  ____________________________________________________________________________    Dermatology Problem List:  1.Onychomycosis vs traumatic changes to the toenails  - left great toe, s/p nail clipping 8/7/19  -terbinafine 250mg po every day x 12 weeks initiated 7/29/20  -baseline LFTs ordered 7/29/20    Encounter Date: Jul 22, 2020    CC:   Chief Complaint   Patient presents with     Derm  Problem       History of Present Illness:  I have reviewed the teledermatology information and the nursing intake corresponding to this issue. Natalie Ny is a 49 year old male who presents via teledermatology for onychomycosis follow-up. Notes that since last year he has not noticed improvement in his toenails. He had a nail clipping done about 1 year about with Dr. Bliss which was positive for fungus. States nails initially seemed to be improving but it became clear this spring that it hasn't really changed and is just as bad as ever; Covid then happened and so he had to push his toenail issue to the back burner. He is finally bothered enough by the appearance that he is interested in trying a systemic anti fungal drug. He is feeling well today. No major medical problems aside from listed in his chart. He is an eye doctor.     ROS: Patient is generally feeling well today  -Constitutional: no unintended weight loss/gain, no night sweats or fevers  -GI: no nausea, abdominal pain, vomiting, diarrhea or blood in the stool  -Skin: as per HPI    Physical Examination:  General: Well-appearing, appropriately-developed individual.  Skin: Focused examination within the teledermatology photograph(s) including face and bilateral dorsal feet and toenails was performed.   -L great toenail with discoloration and thickened hyperkeratosis, appears to possibly effect the second toenail as well    Labs:  LFTs ordered, will repeat in 6 weeks    Past Medical History:   Patient Active Problem List   Diagnosis     Grave's disease     Syncope and collapse     No past medical history on file.  No past surgical history on file.    Social History:  Patient reports that he has never smoked. He has never used smokeless tobacco.     Patient is an opthamologist.    Family History:  No family history on file.    Medications:  Current Outpatient Medications   Medication     albuterol (PROAIR HFA/PROVENTIL HFA/VENTOLIN HFA) 108 (90 BASE)  "MCG/ACT Inhaler     finasteride (PROPECIA) 1 MG tablet     fluticasone (FLOVENT HFA) 220 MCG/ACT Inhaler     levothyroxine (SYNTHROID/LEVOTHROID) 200 MCG tablet     nadolol (CORGARD) 20 MG tablet     ranitidine (ZANTAC) 150 MG tablet     No current facility-administered medications for this visit.      Allergies   Allergen Reactions     Amoxicillin Rash     Penicillins Rash   ___________________________________________________________________________    Teledermatology information:  - Location of patient: [place]  - Patient presented as: return  - Location of teledermatologist:  ([facility] )  - Reason teledermatology is appropriate:  of National Emergency Regarding Coronavirus disease (COVID 19) Outbreak  - Image quality and interpretability: acceptable  - Physician has received verbal consent for a Video/Photos Visit from the patient? Yes  - In-person dermatology visit recommendation: no  - Date of images: 7/20/20  - Length of call: 6min  - Date of report: 7/29/2020     Teledermatology Nurse Call for RETURN patients seen within the last 3 years:      The patient was contacted by phone and we reviewed they have a visit in teledermatology upcoming with an MD or ADILSON;  Importantly, \"a teledermatology visit may not be as thorough as an in-person visit, and the quality of the photograph and/or video sent may not be of the same quality as that taken by the dermatology clinic.\"     We have found that certain health care needs can be provided without the need for an in-person physical exam.   If a prescription is necessary we can send it directly to your pharmacy.  If lab work is needed we can place an order for that and you can then stop by our lab to have the test done at a later time.If during the course of the call the physician/provider feels a video visit is not appropriate, you will not be charged for this service.Visits are billed at different rates depending on your insurance coverage. Please " reach out to your insurance provider with any questions.    The patient chose to:                                                                                                                                                                                                                    Consent to a teledermatology visit with [employer] photos. Patient told by nursing these are already uploaded. Instructions sent to patient via [employer]. They verified they will be in the state of [place] at the time of the encounter.                                                                                                                                                                                                                                 The patient will send photographs via [employer] for review. Instructions for photography are being sent to the patient via [employer] messaging.       The patient verified the location of the photo/video visit to be [place].(The physician must be notified by nurse if the patient is not in the state of [place] during the encounter)    The patient denied skin pain, fever, mucosal symptoms(lesions, blisters, sores in the mouth, nose, eyes, or genitals)  IF PATIENT ENDORSES ANY OF THESE STOP AND PAGE ON CALL ATTENDING      Natalie Ny's goals for this visit include:   Chief Complaint   Patient presents with     Derm Problem     He requests these members of his care team be copied on today's visit information: Yes    PCP: Jed Knox    Referring Provider:  No referring provider defined for this encounter.    There were no vitals taken for this visit.    Do you need any medication refills at today's visit? No    -Patient is cautious about previous medications recommended to him for treatment. Patient was uploaded this morning under [id number] message originally dated 7/20/2020.    Rosanne Sow LPN                                                                                                                                                                                                                                    7/29 Provided phone number [phone] to schedule a 12 week telephone visit around 9/9 with Stefano.        Nelida [facility]   Procedure    Ortho/Sports Med/Pod/Ent/Eye/Surgical Specialties  [facility] [place]   [phone]      Again, thank you for allowing me to participate in the care of your patient.        Sincerely,        Nidia Agarwal PA-C

## 2020-07-30 ENCOUNTER — TELEPHONE (OUTPATIENT)
Dept: DERMATOLOGY | Facility: CLINIC | Age: 50
End: 2020-07-30

## 2020-07-30 DIAGNOSIS — Z79.899 MEDICATION MANAGEMENT: ICD-10-CM

## 2020-07-30 LAB
ALT SERPL W P-5'-P-CCNC: 44 U/L (ref 0–70)
AST SERPL W P-5'-P-CCNC: 24 U/L (ref 0–45)

## 2020-07-30 PROCEDURE — 84450 TRANSFERASE (AST) (SGOT): CPT | Performed by: PHYSICIAN ASSISTANT

## 2020-07-30 PROCEDURE — 84460 ALANINE AMINO (ALT) (SGPT): CPT | Performed by: PHYSICIAN ASSISTANT

## 2020-07-30 PROCEDURE — 36415 COLL VENOUS BLD VENIPUNCTURE: CPT | Performed by: PHYSICIAN ASSISTANT

## 2020-07-30 NOTE — TELEPHONE ENCOUNTER
Spoke to patient regarding results. Scheduled follow up with Nidia in 12 weeks. No further questions or concerns at this time.   ALEX Amezcua Britney D, ADILSON   7/30/2020 12:10 PM       Labs look great, ok to start oral terbinafine 250mg daily

## 2020-08-05 NOTE — TELEPHONE ENCOUNTER
DIAGNOSIS: R wrist pain, no outside records, appt per pt   APPOINTMENT DATE: 8/13   NOTES STATUS DETAILS   OFFICE NOTE from referring provider N/A    OFFICE NOTE from other specialist N/A    DISCHARGE SUMMARY from hospital N/A    DISCHARGE REPORT from the ER N/A    OPERATIVE REPORT N/A    MEDICATION LIST Internal    MRI N/A    CT SCAN N/A    XRAYS (IMAGES & REPORTS) N/A

## 2020-08-11 DIAGNOSIS — L65.9 HAIR LOSS: ICD-10-CM

## 2020-08-11 DIAGNOSIS — E03.9 ACQUIRED HYPOTHYROIDISM: ICD-10-CM

## 2020-08-11 DIAGNOSIS — E78.00 HIGH BLOOD CHOLESTEROL: ICD-10-CM

## 2020-08-13 ENCOUNTER — MYC REFILL (OUTPATIENT)
Dept: CARDIOLOGY | Facility: CLINIC | Age: 50
End: 2020-08-13

## 2020-08-13 ENCOUNTER — PRE VISIT (OUTPATIENT)
Dept: ORTHOPEDICS | Facility: CLINIC | Age: 50
End: 2020-08-13

## 2020-08-13 ENCOUNTER — MYC REFILL (OUTPATIENT)
Dept: ENDOCRINOLOGY | Facility: CLINIC | Age: 50
End: 2020-08-13

## 2020-08-13 ENCOUNTER — ANCILLARY PROCEDURE (OUTPATIENT)
Dept: GENERAL RADIOLOGY | Facility: CLINIC | Age: 50
End: 2020-08-13
Attending: FAMILY MEDICINE
Payer: COMMERCIAL

## 2020-08-13 ENCOUNTER — OFFICE VISIT (OUTPATIENT)
Dept: ORTHOPEDICS | Facility: CLINIC | Age: 50
End: 2020-08-13
Payer: COMMERCIAL

## 2020-08-13 VITALS — HEIGHT: 70 IN | WEIGHT: 200 LBS | BODY MASS INDEX: 28.63 KG/M2

## 2020-08-13 DIAGNOSIS — R55 SYNCOPE, UNSPECIFIED SYNCOPE TYPE: ICD-10-CM

## 2020-08-13 DIAGNOSIS — M65.4 DE QUERVAIN'S TENOSYNOVITIS, RIGHT: ICD-10-CM

## 2020-08-13 DIAGNOSIS — E03.9 HYPOTHYROIDISM: ICD-10-CM

## 2020-08-13 DIAGNOSIS — M65.4 DE QUERVAIN'S TENOSYNOVITIS, RIGHT: Primary | ICD-10-CM

## 2020-08-13 RX ORDER — LEVOTHYROXINE SODIUM 200 UG/1
200 TABLET ORAL DAILY
Qty: 90 TABLET | Refills: 0 | Status: SHIPPED | OUTPATIENT
Start: 2020-08-13 | End: 2020-09-30

## 2020-08-13 RX ORDER — NADOLOL 20 MG/1
20 TABLET ORAL DAILY
Qty: 90 TABLET | Refills: 0 | Status: CANCELLED | OUTPATIENT
Start: 2020-08-13

## 2020-08-13 ASSESSMENT — MIFFLIN-ST. JEOR: SCORE: 1778.44

## 2020-08-13 NOTE — PROGRESS NOTES
" Subjective:   Natalie Ny is a 50 year old male who complains of right wrist pain that has been bothering him over the last few months.       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  He realized over the last few months that he has been having right hand pain near the radial wrist.  He thinks it is de Quervain's.  Denies any trauma.  He is able to do his daily activities without difficulty.  He does have 2 small children at home and notes difficulty when lifting them.  He denies any numbness tingling or weakness.  He has not had any trauma to the area.    Background:   Date of injury: None   Duration of symptoms: 4 months  Mechanism of Injury: Insidious Onset; Unknown   Aggravating factors: Heavy lifting, tender to touch  Relieving Factors: NSAIDs  Prior Evaluation: None    PAST MEDICAL, SOCIAL, SURGICAL AND FAMILY HISTORY: Past medical, surgical, family and social history was reviewed and unchanged since last visit.  ALLERGIES: He is allergic to amoxicillin and penicillins.    CURRENT MEDICATIONS: He has a current medication list which includes the following prescription(s): albuterol, finasteride, levothyroxine, nadolol, ranitidine, terbinafine, and fluticasone.     REVIEW OF SYSTEMS: As noted above otherwise negative.  No recent illnesses.  No fevers, nausea, vomiting, cough, shortness of breath or any other symptoms.     Exam:   Ht 1.778 m (5' 10\")   Wt 90.7 kg (200 lb)   BMI 28.70 kg/m        CONSTITUTIONAL: healthy, alert and no distress  HEAD: Normocephalic. No masses, lesions, tenderness or abnormalities  SKIN: no suspicious lesions or rashes  GAIT: normal  NEUROLOGIC: Non-focal  PSYCHIATRIC: affect normal/bright and mentation appears normal.    MUSCULOSKELETAL:      Right Wrist: Patient has good strength and sensation throughout right wrist.  5/5 strength with flexion, extension, ulnar and radial deviation of the hand.  Patient does have some tenderness to palpation " over thumb extensor tendons.  Positive Finkelstein's.    XRAY right wrist (8/13/2020): 1. No acute osseous abnormality. 2. No substantial degenerative change.        Assessment/Plan:   1. De Quervain's tenosynovitis, right  Xray is negative for any osseous abnormalities.  Patient's history and exam most consistent with de Quervain's tenosynovitis.  We will proceed with conservative measures at this time including appropriate bracing and therapy and a course of NSAIDs which he has been doing appropriately with naproxen.  If patient symptoms fail to improve in the next 3-4  weeks he should return to clinic.  At that time we can discuss the appropriateness of possible steroid injection.  - XR Wrist Right G/E 3 Views; Future  - HAND THERAPY Occupational Therapy or Physical Therapy; Future    Return to clinic in 4 weeks for follow up. Return sooner if develops new or worsening symptoms.    Options for treatment and/or follow-up care were reviewed with the patient was actively involved in the decision making process. Patient verbalized understanding and was in agreement with the plan.    The patient was seen by and discussed with Dr.Suzanne KLEBER Bear MD, CAQ, CCD    Sabrina Sánchez DO  Primary Care Sports Medicine Fellow

## 2020-08-13 NOTE — LETTER
"  8/13/2020      RE: Natalie Ny  [address] [facility] [address]        Subjective:   Natalie Ny is a 50 year old male who complains of right wrist pain that has been bothering him over the last few months.       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  He realized over the last few months that he has been having right hand pain near the radial wrist.  He thinks it is de Quervain's.  Denies any trauma.  He is able to do his daily activities without difficulty.  He does have 2 small children at home and notes difficulty when lifting them.  He denies any numbness tingling or weakness.  He has not had any trauma to the area.    Background:   Date of injury: None   Duration of symptoms: 4 months  Mechanism of Injury: Insidious Onset; Unknown   Aggravating factors: Heavy lifting, tender to touch  Relieving Factors: NSAIDs  Prior Evaluation: None    PAST MEDICAL, SOCIAL, SURGICAL AND FAMILY HISTORY: Past medical, surgical, family and social history was reviewed and unchanged since last visit.  ALLERGIES: He is allergic to amoxicillin and penicillins.    CURRENT MEDICATIONS: He has a current medication list which includes the following prescription(s): albuterol, finasteride, levothyroxine, nadolol, ranitidine, terbinafine, and fluticasone.     REVIEW OF SYSTEMS: As noted above otherwise negative.  No recent illnesses.  No fevers, nausea, vomiting, cough, shortness of breath or any other symptoms.     Exam:   Ht 1.778 m (5' 10\")   Wt 90.7 kg (200 lb)   BMI 28.70 kg/m        CONSTITUTIONAL: healthy, alert and no distress  HEAD: Normocephalic. No masses, lesions, tenderness or abnormalities  SKIN: no suspicious lesions or rashes  GAIT: normal  NEUROLOGIC: Non-focal  PSYCHIATRIC: affect normal/bright and mentation appears normal.    MUSCULOSKELETAL:      Right Wrist: Patient has good strength and sensation throughout right wrist.  5/5 strength with flexion, extension, " ulnar and radial deviation of the hand.  Patient does have some tenderness to palpation over thumb extensor tendons.  Positive Finkelstein's.    XRAY right wrist (8/13/2020): 1. No acute osseous abnormality. 2. No substantial degenerative change.        Assessment/Plan:   1. De Quervain's tenosynovitis, right  Xray is negative for any osseous abnormalities.  Patient's history and exam most consistent with de Quervain's tenosynovitis.  We will proceed with conservative measures at this time including appropriate bracing and therapy and a course of NSAIDs which he has been doing appropriately with naproxen.  If patient symptoms fail to improve in the next 3-4  weeks he should return to clinic.  At that time we can discuss the appropriateness of possible steroid injection.  - XR Wrist Right G/E 3 Views; Future  - HAND THERAPY Occupational Therapy or Physical Therapy; Future    Return to clinic in 4 weeks for follow up. Return sooner if develops new or worsening symptoms.    Options for treatment and/or follow-up care were reviewed with the patient was actively involved in the decision making process. Patient verbalized understanding and was in agreement with the plan.    The patient was seen by and discussed with Dr.Suzanne KLEBER Bear MD, CAQ, CCD    Sabrina Sánchez DO  Primary Care Sports Medicine Fellow          Attending Note:   I have personally examined this patient and have reviewed the clinical presentation and progress note with the fellow. I agree with the treatment plan as outlined. The plan was formulated with the fellow on the day of the patient's visit. I have reviewed all imaging with the fellow and agree with the findings in the documentation.     Brenda Bear MD, CAQ, CCD  [facility]  Sports Medicine and Bone Health    Sabrina Sánchez DO

## 2020-08-13 NOTE — TELEPHONE ENCOUNTER
UCMDE patient.    Will forward to CSC TEAM.    Inga Lopez LPN  Diabetes Clinic Coordinator   Adult Endocrinology and Pediatric Specialty Clinics  [facility]

## 2020-08-13 NOTE — PROGRESS NOTES
Attending Note:   I have personally examined this patient and have reviewed the clinical presentation and progress note with the fellow. I agree with the treatment plan as outlined. The plan was formulated with the fellow on the day of the patient's visit. I have reviewed all imaging with the fellow and agree with the findings in the documentation.     Brenda Bear MD, CAQ, CCD  [facility]  Sports Medicine and Bone Health

## 2020-08-13 NOTE — TELEPHONE ENCOUNTER
Thyroid Protocol [id number]/13/2020 02:46 PM   Recent (12 mo) or future (30 days) visit within the authorizing provider's specialty Protocol Details    Normal TSH on file in past 12 months      RTC in place 11/02/2020 with Dr Calvo.  Lab order placed.

## 2020-08-14 RX ORDER — FINASTERIDE 1 MG/1
TABLET, FILM COATED ORAL
Qty: 30 TABLET | Refills: 0 | OUTPATIENT
Start: 2020-08-14

## 2020-08-14 NOTE — TELEPHONE ENCOUNTER
nadolol (CORGARD) 20 MG tablet; RF available      Last Written Prescription Date:  7-1-20  Last Fill Quantity: 90,   # refills: 0  [place]LA[place], [place] - [address]

## 2020-08-20 ENCOUNTER — OFFICE VISIT (OUTPATIENT)
Dept: SURGERY | Facility: CLINIC | Age: 50
End: 2020-08-20
Attending: FAMILY MEDICINE
Payer: COMMERCIAL

## 2020-08-20 ENCOUNTER — PRE VISIT (OUTPATIENT)
Dept: SURGERY | Facility: CLINIC | Age: 50
End: 2020-08-20

## 2020-08-20 VITALS
OXYGEN SATURATION: 100 % | HEART RATE: 71 BPM | HEIGHT: 70 IN | WEIGHT: 208.7 LBS | BODY MASS INDEX: 29.88 KG/M2 | SYSTOLIC BLOOD PRESSURE: 138 MMHG | TEMPERATURE: 98.1 F | DIASTOLIC BLOOD PRESSURE: 84 MMHG

## 2020-08-20 DIAGNOSIS — K60.2 ANAL FISSURE: Primary | ICD-10-CM

## 2020-08-20 ASSESSMENT — PAIN SCALES - GENERAL: PAINLEVEL: NO PAIN (0)

## 2020-08-20 ASSESSMENT — MIFFLIN-ST. JEOR: SCORE: 1817.91

## 2020-08-20 NOTE — PROGRESS NOTES
"Colon and Rectal Surgery Consult Clinic Note    Date: 2020     Referring provider:  Jed Knox MD  [address] [place] [id number]     RE: Natalie Ny  : 1970  CAMPOS: 2020    Natalie Ny is a very pleasant 50 year old male who presents today for rectal bleeding.    HPI: Natalie reports developing some bright red blood intermittently starting in 2018.  He had a colonoscopy at that time, which was normal.  He was told that he likely had anal fissures.  He now gets a tearing sensation and bright red blood with bowel movements intermittently every few weeks.  This happens especially if he has larger stools.  He then does well for several weeks before another episode occurs.  He recently in the past 2 weeks started taking Colace and bowel movements have been much softer.  He is never had any treatment for fissures.  He last noticed some bleeding and pain yesterday.    PMH: No family history of any colorectal cancers.    PSH: History of Graves' disease.    Physical Examination: Exam was chaperoned by Yuli Hughes, EMT   /84 (BP Location: Left arm, Patient Position: Sitting, Cuff Size: Adult Large)   Pulse 71   Temp 98.1  F (36.7  C) (Oral)   Ht 5' 10\"   Wt 208 lb 11.2 oz   SpO2 100%   BMI 29.95 kg/m    General: alert, oriented, in no acute distress, sitting comfortably  HEENT: mucous membranes moist  Perianal external examination:  Perianal skin: Intact with no excoriation or lichenification.  Lesions: No evidence of an external lesion, nodularity, or induration in the perianal region.  Eversion of buttocks: There was evidence of an anal fissure. Details: superficial posterior midline anal fissure.  Skin tags or external hemorrhoids: None.  Digital rectal examination: Was performed.   Sphincter tone: Good.  Palpable lesions: No.  Prostate: Normal.  Other: None.    Anoscopy: Was performed.   Hemorrhoids: No significant internal hemorrhoids.  Lesions: " No    Assessment/Plan: 50 year old male with anal fissure. I discussed that this is likely the source of the pain and bleeding. Discussed the importance of keeping stools soft and easy to pass while healing fissure.  Recommended starting on a daily fiber supplement and can add in a laxative in addition as needed.  Will treat with topical diltiazem with follow up in 8 weeks. Discussed that it may take several weeks for symptoms to improve. If no improvement is noted after 4 weeks, return to clinic and discuss further intervention such as Botox injections.  Advised the use of Tylenol, ibuprofen, and warm tub baths for any pain. Patient's questions were answered to his stated satisfaction and he is in agreement with this plan.    Medical history:  No past medical history on file.    Surgical history:  No past surgical history on file.    Problem list:  Patient Active Problem List    Diagnosis Date Noted     Syncope and collapse 07/19/2012     Priority: Medium     Grave's disease 12/18/2011     Priority: Medium     IMO update changed this record. Please review for accuracy         Medications:  Current Outpatient Medications   Medication Sig Dispense Refill     diltiazem 2% in PLO gel Apply small amount to anal opening three times daily for 8 weeks. 60 g 0     finasteride (PROPECIA) 1 MG tablet Take 1 tablet (1 mg) by mouth daily 90 tablet 0     levothyroxine (SYNTHROID/LEVOTHROID) 200 MCG tablet Take 1 tablet (200 mcg) by mouth daily Call clinic to schedule follow up appointment. 90 tablet 0     nadolol (CORGARD) 20 MG tablet Take 1 tablet (20 mg) by mouth daily Call clinic to schedule follow up appointment. 90 tablet 0     ranitidine (ZANTAC) 150 MG tablet Take 150 mg by mouth as needed.       albuterol (PROAIR HFA/PROVENTIL HFA/VENTOLIN HFA) 108 (90 BASE) MCG/ACT Inhaler Inhale 2 puffs into the lungs every 6 hours as needed for shortness of breath / dyspnea or wheezing (Patient not taking: Reported on 8/20/2020) 1  "Inhaler 1     fluticasone (FLOVENT HFA) 220 MCG/ACT Inhaler Inhale 2 puffs into the lungs 2 times daily (Patient not taking: Reported on 8/7/2019) 1 Inhaler 3     terbinafine (LAMISIL) 250 MG tablet Take 1 tablet (250 mg) by mouth daily (Patient not taking: Reported on 8/20/2020) 84 tablet 0       Allergies:  Allergies   Allergen Reactions     Amoxicillin Rash     Penicillins Rash       Family history:  No family history on file.    Social history:  Social History     Tobacco Use     Smoking status: Never Smoker     Smokeless tobacco: Never Used   Substance Use Topics     Alcohol use: Not on file    Marital status: .  Occupation: opthamologist.    Nursing Notes:   Yuli Hughes EMT  8/20/2020  7:21 AM  Signed  Chief Complaint   Patient presents with     Consult     Hemorrhoids.       Vitals:    08/20/20 0702   BP: 138/84   BP Location: Left arm   Patient Position: Sitting   Cuff Size: Adult Large   Pulse: 71   Temp: 98.1  F (36.7  C)   TempSrc: Oral   SpO2: 100%   Weight: 94.7 kg (208 lb 11.2 oz)   Height: 1.778 m (5' 10\")       Body mass index is 29.95 kg/m .      SAVANNA Haque                         Total face to face time was 20 minutes, >50% counseling.    AURA Torres, NP-C  Colon and Rectal Surgery   [facility]    This note was created using speech recognition software and may contain unintended word substitutions.    "

## 2020-08-20 NOTE — LETTER
"2020       RE: Natalie Ny  [address] [facility] [address]     Dear Colleague,    Thank you for referring your patient, Natalie Ny, to the [facility] COLON AND RECTAL SURGERY at [facility]. Please see a copy of my visit note below.    Colon and Rectal Surgery Consult Clinic Note    Date: 2020     Referring provider:  Jed Knox MD  [address] [place] [address]     RE: Natalie Ny  : 1970  CAMPOS: 2020    Natalie Ny is a very pleasant 50 year old male who presents today for rectal bleeding.    HPI: Natalie reports developing some bright red blood intermittently starting in 2018.  He had a colonoscopy at that time, which was normal.  He was told that he likely had anal fissures.  He now gets a tearing sensation and bright red blood with bowel movements intermittently every few weeks.  This happens especially if he has larger stools.  He then does well for several weeks before another episode occurs.  He recently in the past 2 weeks started taking Colace and bowel movements have been much softer.  He is never had any treatment for fissures.  He last noticed some bleeding and pain yesterday.    PMH: No family history of any colorectal cancers.    PSH: History of Graves' disease.    Physical Examination: Exam was chaperoned by Yuli Hughes EMT   /84 (BP Location: Left arm, Patient Position: Sitting, Cuff Size: Adult Large)   Pulse 71   Temp 98.1  F (36.7  C) (Oral)   Ht 5' 10\"   Wt 208 lb 11.2 oz   SpO2 100%   BMI 29.95 kg/m    General: alert, oriented, in no acute distress, sitting comfortably  HEENT: mucous membranes moist  Perianal external examination:  Perianal skin: Intact with no excoriation or lichenification.  Lesions: No evidence of an external lesion, nodularity, or induration in the perianal region.  Eversion of buttocks: There was evidence of an anal fissure. Details: superficial posterior " midline anal fissure.  Skin tags or external hemorrhoids: None.  Digital rectal examination: Was performed.   Sphincter tone: Good.  Palpable lesions: No.  Prostate: Normal.  Other: None.    Anoscopy: Was performed.   Hemorrhoids: No significant internal hemorrhoids.  Lesions: No    Assessment/Plan: 50 year old male with anal fissure. I discussed that this is likely the source of the pain and bleeding. Discussed the importance of keeping stools soft and easy to pass while healing fissure.  Recommended starting on a daily fiber supplement and can add in a laxative in addition as needed.  Will treat with topical diltiazem with follow up in 8 weeks. Discussed that it may take several weeks for symptoms to improve. If no improvement is noted after 4 weeks, return to clinic and discuss further intervention such as Botox injections.  Advised the use of Tylenol, ibuprofen, and warm tub baths for any pain. Patient's questions were answered to his stated satisfaction and he is in agreement with this plan.    Medical history:  No past medical history on file.    Surgical history:  No past surgical history on file.    Problem list:  Patient Active Problem List    Diagnosis Date Noted     Syncope and collapse 07/19/2012     Priority: Medium     Grave's disease 12/18/2011     Priority: Medium     IMO update changed this record. Please review for accuracy       Medications:  Current Outpatient Medications   Medication Sig Dispense Refill     diltiazem 2% in PLO gel Apply small amount to anal opening three times daily for 8 weeks. 60 g 0     finasteride (PROPECIA) 1 MG tablet Take 1 tablet (1 mg) by mouth daily 90 tablet 0     levothyroxine (SYNTHROID/LEVOTHROID) 200 MCG tablet Take 1 tablet (200 mcg) by mouth daily Call clinic to schedule follow up appointment. 90 tablet 0     nadolol (CORGARD) 20 MG tablet Take 1 tablet (20 mg) by mouth daily Call clinic to schedule follow up appointment. 90 tablet 0     ranitidine (ZANTAC) 150  "MG tablet Take 150 mg by mouth as needed.       albuterol (PROAIR HFA/PROVENTIL HFA/VENTOLIN HFA) 108 (90 BASE) MCG/ACT Inhaler Inhale 2 puffs into the lungs every 6 hours as needed for shortness of breath / dyspnea or wheezing (Patient not taking: Reported on 8/20/2020) 1 Inhaler 1     fluticasone (FLOVENT HFA) 220 MCG/ACT Inhaler Inhale 2 puffs into the lungs 2 times daily (Patient not taking: Reported on 8/7/2019) 1 Inhaler 3     terbinafine (LAMISIL) 250 MG tablet Take 1 tablet (250 mg) by mouth daily (Patient not taking: Reported on 8/20/2020) 84 tablet 0     Allergies:  Allergies   Allergen Reactions     Amoxicillin Rash     Penicillins Rash     Family history:  No family history on file.    Social history:  Social History     Tobacco Use     Smoking status: Never Smoker     Smokeless tobacco: Never Used   Substance Use Topics     Alcohol use: Not on file    Marital status: .  Occupation: opthamologist.    Nursing Notes:   Yuli Hughes EMT  8/20/2020  7:21 AM  Signed  Chief Complaint   Patient presents with     Consult     Hemorrhoids.         Vitals:    08/20/20 0702   BP: 138/84   BP Location: Left arm   Patient Position: Sitting   Cuff Size: Adult Large   Pulse: 71   Temp: 98.1  F (36.7  C)   TempSrc: Oral   SpO2: 100%   Weight: 94.7 kg (208 lb 11.2 oz)   Height: 1.778 m (5' 10\")       Body mass index is 29.95 kg/m .      SAVANNA Haque       Total face to face time was 20 minutes, >50% counseling.    This note was created using speech recognition software and may contain unintended word substitutions.    Again, thank you for allowing me to participate in the care of your patient.  Sincerely,    AURA Torres, NP-C  Colon and Rectal Surgery   [facility]  "

## 2020-08-20 NOTE — PATIENT INSTRUCTIONS
1. Diltiazem ointment 2% to be applied 3 times daily for 8 weeks  2. Fiber supplement such as Metamucil, Citrucel, or Benefiber once to twice a day  3. MiraLax or Milk of Magnesia if still constipated  4. Drink 10 glasses of water a day  5. Tylenol, ibuprofen, and warm tub baths/sitz baths for pain  6. Follow up in 8 weeks. Follow up sooner if symptoms do not improve after 4 weeks.

## 2020-08-20 NOTE — NURSING NOTE
"Chief Complaint   Patient presents with     Consult     Hemorrhoids.       Vitals:    08/20/20 0702   BP: 138/84   BP Location: Left arm   Patient Position: Sitting   Cuff Size: Adult Large   Pulse: 71   Temp: 98.1  F (36.7  C)   TempSrc: Oral   SpO2: 100%   Weight: 94.7 kg (208 lb 11.2 oz)   Height: 1.778 m (5' 10\")       Body mass index is 29.95 kg/m .      Yuli Hughes, EMT                      "

## 2020-08-27 ENCOUNTER — VIRTUAL VISIT (OUTPATIENT)
Dept: CARDIOLOGY | Facility: CLINIC | Age: 50
End: 2020-08-27
Attending: INTERNAL MEDICINE
Payer: COMMERCIAL

## 2020-08-27 DIAGNOSIS — R55 VASOVAGAL SYNCOPE: Primary | ICD-10-CM

## 2020-08-27 PROCEDURE — 99213 OFFICE O/P EST LOW 20 MIN: CPT | Mod: 95 | Performed by: INTERNAL MEDICINE

## 2020-08-27 NOTE — PROGRESS NOTES
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 Otherwise has been healthy     He is seen here to document absence of CV symptoms formedical licensure/insurance issues     Remains  on staff at      Since his last visit he has not experienced any CV symptoms or other health issues.       PAST MEDICAL HISTORY:  See HPI    CURRENT MEDICATIONS:  Current Outpatient Medications   Medication Sig Dispense Refill     albuterol (PROAIR HFA/PROVENTIL HFA/VENTOLIN HFA) 108 (90 BASE) MCG/ACT Inhaler Inhale 2 puffs into the lungs every 6 hours as needed for shortness of breath / dyspnea or wheezing (Patient not taking: Reported on 8/20/2020) 1 Inhaler 1     diltiazem 2% in PLO gel Apply small amount to anal opening three times daily for 8 weeks. 60 g 0     finasteride (PROPECIA) 1 MG tablet Take 1 tablet (1 mg) by mouth daily 90 tablet 0     fluticasone (FLOVENT HFA) 220 MCG/ACT Inhaler Inhale 2 puffs into the lungs 2 times daily (Patient not taking: Reported on 8/7/2019) 1 Inhaler 3     levothyroxine (SYNTHROID/LEVOTHROID) 200 MCG tablet Take 1 tablet (200 mcg) by mouth daily Call clinic to schedule follow up appointment. 90 tablet 0     nadolol (CORGARD) 20 MG tablet Take 1 tablet (20 mg) by mouth daily Call clinic to schedule follow up appointment. 90 tablet 0     ranitidine (ZANTAC) 150 MG tablet Take 150 mg by mouth as needed.       terbinafine (LAMISIL) 250 MG tablet Take 1 tablet (250 mg) by mouth daily (Patient not taking: Reported on 8/20/2020) 84 tablet 0       PAST SURGICAL HISTORY:  No past surgical history on file.    ALLERGIES:     Allergies   Allergen Reactions     Amoxicillin Rash     Penicillins Rash       FAMILY HISTORY:  No family history on file.    SOCIAL HISTORY:  Social History     Tobacco Use     Smoking status: Never Smoker     Smokeless tobacco: Never Used   Substance Use Topics     Alcohol use: Not on file      Drug use: Not on file       ROS:   Constitutional: No fever, chills, or sweats. Weight stable.   ENT: No visual disturbance, ear ache, epistaxis, sore throat.   Cardiovascular: As per HPI.   Respiratory: No cough, hemoptysis.    GI: No nausea, vomiting, hematemesis   : No hematuria.   Integument: Negative.   Psychiatric: Negative.   Hematologic:  Easy bruising, no easy bleeding.  Neuro: Negative.   Endocrinology: No significant heat or cold intolerance   Musculoskeletal: No myalgia.    Exam:  There were no vitals taken for this visit.    RESPIRATORY:no rales, rhonchi or wheezes, no use of accessory muscles, no retractions, respirations are unlabored, normal respiratory rate  NEURO: alert and oriented to person/place/time, normal speech    Labs:  CBC RESULTS:   Lab Results   Component Value Date    WBC 6.1 10/22/2018    RBC 4.66 10/22/2018    HGB 14.1 10/22/2018    HCT 42.1 10/22/2018    MCV 90 10/22/2018    MCH 30.3 10/22/2018    MCHC 33.5 10/22/2018    RDW 12.3 10/22/2018     (L) 10/22/2018       BMP RESULTS:  Lab Results   Component Value Date     01/29/2013    POTASSIUM 4.8 01/29/2013    CHLORIDE 102 01/29/2013    CO2 30 01/29/2013    ANIONGAP 11 01/29/2013     (H) 01/29/2013    BUN 13 01/29/2013    CR 0.92 01/29/2013    GFRESTIMATED >90 01/29/2013    GFRESTBLACK >90 01/29/2013    KOBY 9.2 01/29/2013        INR RESULTS:  Lab Results   Component Value Date    INR 1.14 10/22/2018       Procedures:  PULMONARY FUNCTION TESTS:   PFT Latest Ref Rng & Units 12/7/2017   FVC L 5.16   FEV1 L 4.06   FVC% % 101   FEV1% % 100         ECHOCARDIOGRAM:   No results found for this or any previous visit (from the past 8760 hour(s)).      Assessment and Plan:   1. Solitary remote reflex faint..no recurrences  2. No new CV symptoms    PLAN  1. 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 Please do not  "hesitate to contact my office if you have any questions or concerns.      Javad Kirkland MD  Cardiac Arrhythmia Service  [facility]  [phone]      CC  SELF, REFERRED  Natalie Ny is a 50 year old male who is being evaluated via a billable telephone visit.      The patient has been notified of following:     \"This telephone visit will be conducted via a call between you and your physician/provider. We have found that certain health care needs can be provided without the need for a physical exam.  This service lets us provide the care you need with a short phone conversation.  If a prescription is necessary we can send it directly to your pharmacy.  If lab work is needed we can place an order for that and you can then stop by our lab to have the test done at a later time.  If during the course of the call the physician/provider feels a telephone visit is not appropriate, you will not be charged for this service.\"    Patient has given verbal consent for Telephone visit?  Yes    What phone number would you like to be contacted at? [phone]    How would you like to obtain your AVS? Mail a copy    "

## 2020-08-27 NOTE — LETTER
8/27/2020      RE: Natalie Ny  [address] [facility] [address]       Dear Colleague,    Thank you for the opportunity to participate in the care of your patient, Natalie Ny, at the [facility] at [facility]. Please see a copy of my visit note below.    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 Otherwise has been healthy     He is seen here to document absence of CV symptoms formedical licensure/insurance issues     Remains  on staff at      Since his last visit he has not experienced any CV symptoms or other health issues.       PAST MEDICAL HISTORY:  See HPI    CURRENT MEDICATIONS:  Current Outpatient Medications   Medication Sig Dispense Refill     albuterol (PROAIR HFA/PROVENTIL HFA/VENTOLIN HFA) 108 (90 BASE) MCG/ACT Inhaler Inhale 2 puffs into the lungs every 6 hours as needed for shortness of breath / dyspnea or wheezing (Patient not taking: Reported on 8/20/2020) 1 Inhaler 1     diltiazem 2% in PLO gel Apply small amount to anal opening three times daily for 8 weeks. 60 g 0     finasteride (PROPECIA) 1 MG tablet Take 1 tablet (1 mg) by mouth daily 90 tablet 0     fluticasone (FLOVENT HFA) 220 MCG/ACT Inhaler Inhale 2 puffs into the lungs 2 times daily (Patient not taking: Reported on 8/7/2019) 1 Inhaler 3     levothyroxine (SYNTHROID/LEVOTHROID) 200 MCG tablet Take 1 tablet (200 mcg) by mouth daily Call clinic to schedule follow up appointment. 90 tablet 0     nadolol (CORGARD) 20 MG tablet Take 1 tablet (20 mg) by mouth daily Call clinic to schedule follow up appointment. 90 tablet 0     ranitidine (ZANTAC) 150 MG tablet Take 150 mg by mouth as needed.       terbinafine (LAMISIL) 250 MG tablet Take 1 tablet (250 mg) by mouth daily (Patient not taking: Reported on 8/20/2020) 84 tablet 0       PAST SURGICAL HISTORY:  No past surgical history on  file.    ALLERGIES:     Allergies   Allergen Reactions     Amoxicillin Rash     Penicillins Rash       FAMILY HISTORY:  No family history on file.    SOCIAL HISTORY:  Social History     Tobacco Use     Smoking status: Never Smoker     Smokeless tobacco: Never Used   Substance Use Topics     Alcohol use: Not on file     Drug use: Not on file       ROS:   Constitutional: No fever, chills, or sweats. Weight stable.   ENT: No visual disturbance, ear ache, epistaxis, sore throat.   Cardiovascular: As per HPI.   Respiratory: No cough, hemoptysis.    GI: No nausea, vomiting, hematemesis   : No hematuria.   Integument: Negative.   Psychiatric: Negative.   Hematologic:  Easy bruising, no easy bleeding.  Neuro: Negative.   Endocrinology: No significant heat or cold intolerance   Musculoskeletal: No myalgia.    Exam:  There were no vitals taken for this visit.    RESPIRATORY:no rales, rhonchi or wheezes, no use of accessory muscles, no retractions, respirations are unlabored, normal respiratory rate  NEURO: alert and oriented to person/place/time, normal speech    Labs:  CBC RESULTS:   Lab Results   Component Value Date    WBC 6.1 10/22/2018    RBC 4.66 10/22/2018    HGB 14.1 10/22/2018    HCT 42.1 10/22/2018    MCV 90 10/22/2018    MCH 30.3 10/22/2018    MCHC 33.5 10/22/2018    RDW 12.3 10/22/2018     (L) 10/22/2018       BMP RESULTS:  Lab Results   Component Value Date     01/29/2013    POTASSIUM 4.8 01/29/2013    CHLORIDE 102 01/29/2013    CO2 30 01/29/2013    ANIONGAP 11 01/29/2013     (H) 01/29/2013    BUN 13 01/29/2013    CR 0.92 01/29/2013    GFRESTIMATED >90 01/29/2013    GFRESTBLACK >90 01/29/2013    KOBY 9.2 01/29/2013        INR RESULTS:  Lab Results   Component Value Date    INR 1.14 10/22/2018       Procedures:  PULMONARY FUNCTION TESTS:   PFT Latest Ref Rng & Units 12/7/2017   FVC L 5.16   FEV1 L 4.06   FVC% % 101   FEV1% % 100         ECHOCARDIOGRAM:   No results found for this or any  "previous visit (from the past 8760 hour(s)).      Assessment and Plan:   1. Solitary remote reflex faint..no recurrences  2. No new CV symptoms    PLAN  1. 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 Please do not hesitate to contact my office if you have any questions or concerns.      Javad Kirkland MD  Cardiac Arrhythmia Service  [facility]  [phone]      CC  SELF, REFERRED  Natalie Ny is a 50 year old male who is being evaluated via a billable telephone visit.      The patient has been notified of following:     \"This telephone visit will be conducted via a call between you and your physician/provider. We have found that certain health care needs can be provided without the need for a physical exam.  This service lets us provide the care you need with a short phone conversation.  If a prescription is necessary we can send it directly to your pharmacy.  If lab work is needed we can place an order for that and you can then stop by our lab to have the test done at a later time.  If during the course of the call the physician/provider feels a telephone visit is not appropriate, you will not be charged for this service.\"    Patient has given verbal consent for Telephone visit?  Yes    What phone number would you like to be contacted at? [phone]    How would you like to obtain your AVS? Mail a copy      Please do not hesitate to contact me if you have any questions/concerns.     Sincerely,     Javad Kirkland MD    "

## 2020-08-28 DIAGNOSIS — E03.4 HYPOTHYROIDISM DUE TO ACQUIRED ATROPHY OF THYROID: Primary | ICD-10-CM

## 2020-08-28 NOTE — PATIENT INSTRUCTIONS
You were seen in the Electrophysiology Clinic today by: Dr. Javad Kirkland MD    Plan:     Follow-up in 1 year or earlier if needed.      Your Care Team:  EP Cardiology   Telephone Number     Pavithra Muñiz RN [phone]     For scheduling appts or procedures:    Sheila Osorio   [phone]   For the Device Clinic (Pacemakers, ICDs, Loop Recorders)    During business hours: [phone]  After business hours:   [phone]- select option 4 and ask for job code [phone].       Cardiovascular Clinic:   [address] [place] [address]. [place] [address]      As always, Thank you for trusting us with your health care needs!

## 2020-09-17 DIAGNOSIS — E03.9 HYPOTHYROIDISM: ICD-10-CM

## 2020-09-17 LAB — TSH SERPL DL<=0.005 MIU/L-ACNC: 2.91 MU/L (ref 0.4–4)

## 2020-09-17 PROCEDURE — 84443 ASSAY THYROID STIM HORMONE: CPT | Performed by: INTERNAL MEDICINE

## 2020-09-17 PROCEDURE — 36415 COLL VENOUS BLD VENIPUNCTURE: CPT | Performed by: INTERNAL MEDICINE

## 2020-09-23 DIAGNOSIS — R55 SYNCOPE, UNSPECIFIED SYNCOPE TYPE: ICD-10-CM

## 2020-09-25 RX ORDER — NADOLOL 20 MG/1
20 TABLET ORAL DAILY
Qty: 90 TABLET | Refills: 3 | Status: SHIPPED | OUTPATIENT
Start: 2020-09-25 | End: 2021-08-31

## 2020-09-25 NOTE — TELEPHONE ENCOUNTER
nadolol (CORGARD) 20 MG tablet   Take 1 tablet (20 mg) by mouth daily      Last Written Prescription Date: 7/1/20  Last Fill Quantity: 90,   # refills: 0  Last Office Visit : 8/27/20 virtual visit   PLAN  1. No med changes     RTC 1 year  Future Office visit:  none    Refill to pharmacy.

## 2020-09-30 DIAGNOSIS — E03.4 HYPOTHYROIDISM DUE TO ACQUIRED ATROPHY OF THYROID: Primary | ICD-10-CM

## 2020-09-30 RX ORDER — LEVOTHYROXINE SODIUM 200 UG/1
TABLET ORAL
Qty: 96 TABLET | Refills: 3 | Status: SHIPPED | OUTPATIENT
Start: 2020-09-30 | End: 2021-10-28

## 2020-10-15 DIAGNOSIS — E78.00 HIGH BLOOD CHOLESTEROL: ICD-10-CM

## 2020-10-15 DIAGNOSIS — E03.9 ACQUIRED HYPOTHYROIDISM: ICD-10-CM

## 2020-10-15 DIAGNOSIS — L65.9 HAIR LOSS: ICD-10-CM

## 2020-10-19 ENCOUNTER — VIRTUAL VISIT (OUTPATIENT)
Dept: FAMILY MEDICINE | Facility: CLINIC | Age: 50
End: 2020-10-19
Payer: COMMERCIAL

## 2020-10-19 DIAGNOSIS — E03.9 ACQUIRED HYPOTHYROIDISM: ICD-10-CM

## 2020-10-19 DIAGNOSIS — E78.00 HIGH BLOOD CHOLESTEROL: ICD-10-CM

## 2020-10-19 DIAGNOSIS — L65.9 HAIR LOSS: ICD-10-CM

## 2020-10-19 PROCEDURE — 99213 OFFICE O/P EST LOW 20 MIN: CPT | Mod: TEL | Performed by: FAMILY MEDICINE

## 2020-10-19 RX ORDER — FINASTERIDE 1 MG/1
1 TABLET, FILM COATED ORAL DAILY
Qty: 90 TABLET | Refills: 3 | Status: SHIPPED | OUTPATIENT
Start: 2020-10-19 | End: 2021-11-26

## 2020-10-19 RX ORDER — FINASTERIDE 1 MG/1
TABLET, FILM COATED ORAL
Qty: 90 TABLET | Refills: 0 | OUTPATIENT
Start: 2020-10-19

## 2020-10-19 NOTE — NURSING NOTE
Chief Complaint   Patient presents with     Recheck Medication     Patient calls in for a follow up.         Carmine Nelson MA on 10/19/2020 at 9:19 AM

## 2020-10-19 NOTE — PROGRESS NOTES
"Natalie Ny is a 50 year old male who is being evaluated via a billable telephone visit.      The patient has been notified of following:     \"This telephone visit will be conducted via a call between you and your physician/provider. We have found that certain health care needs can be provided without the need for a physical exam.  This service lets us provide the care you need with a short phone conversation.  If a prescription is necessary we can send it directly to your pharmacy.  If lab work is needed we can place an order for that and you can then stop by our lab to have the test done at a later time.    Telephone visits are billed at different rates depending on your insurance coverage. During this emergency period, for some insurers they may be billed the same as an in-person visit.  Please reach out to your insurance provider with any questions.    If during the course of the call the physician/provider feels a telephone visit is not appropriate, you will not be charged for this service.\"    Patient has given verbal consent for Telephone visit?  Yes    What phone number would you like to be contacted at? [phone], this was amwell video call    How would you like to obtain your AVS? MyChart    Subjective     Natalie Ny is a 50 year old male who presents via phone visit today for the following health issues:    HPI   Video start 9:30         Review of Systems   Here in f/u  Flu shot at work  Needs RF propecia, stabilizes hair, tolerated  Anal fissure better w/ colorectal program  Due for routine labs    No acute concerns    Current Outpatient Medications   Medication     diltiazem 2% in PLO gel     finasteride (PROPECIA) 1 MG tablet     levothyroxine (SYNTHROID/LEVOTHROID) 200 MCG tablet     nadolol (CORGARD) 20 MG tablet     ranitidine (ZANTAC) 150 MG tablet     terbinafine (LAMISIL) 250 MG tablet     No current facility-administered medications for this visit.      Allergies   Allergen " Reactions     Amoxicillin Rash     Penicillins Rash     Thyroid per endo         Objective          Vitals:  No vitals were obtained today due to virtual visit.    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

## 2020-10-31 NOTE — PROGRESS NOTES
"Natalie Ny is a 50 year old male who is being evaluated via a billable telephone visit.      The patient has been notified of following:     \"This telephone visit will be conducted via a call between you and your physician/provider. We have found that certain health care needs can be provided without the need for a physical exam.  This service lets us provide the care you need with a short phone conversation.  If a prescription is necessary we can send it directly to your pharmacy.  If lab work is needed we can place an order for that and you can then stop by our lab to have the test done at a later time.    Telephone visits are billed at different rates depending on your insurance coverage. During this emergency period, for some insurers they may be billed the same as an in-person visit.  Please reach out to your insurance provider with any questions.    If during the course of the call the physician/provider feels a telephone visit is not appropriate, you will not be charged for this service.\"    Patient has given verbal consent for Telephone visit?  Yes    What phone number would you like to be contacted at? [phone]    How would you like to obtain your AVS? 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  He is status post radioactive iodine ablation in 2007, and has been on levothyroxine replacement ever since. He was  s on  200 daily until about a month ago. At that time he sent me a message that he was feeling cold and having drier skin than normal. He wondered if the dose could be increased.  We decided he should take 200 mcg on 6 days of the week and 400 mcg on the 7th.  He has a new pill box that he uses for monitor his doses and with this help he has not had difficulty following this regimen.  He doesn't really feel any different.  .Generally he feels well.  Energy level is " good.  He has no heat or cold intolerance.  He denies palpitations.  Weight is stable.  He has no problems with his eyes.    Current Outpatient Medications   Medication     diltiazem 2% in PLO gel     finasteride (PROPECIA) 1 MG tablet     levothyroxine (SYNTHROID/LEVOTHROID) 200 MCG tablet     nadolol (CORGARD) 20 MG tablet     ranitidine (ZANTAC) 150 MG tablet     terbinafine (LAMISIL) 250 MG tablet     No current facility-administered medications for this visit.      NAD    ENDO THYROID LABS-[facility] Latest Ref Rng & Units 9/17/2020 8/20/2020   TSH 0.40 - 4.00 mU/L 2.91    T4 FREE 0.76 - 1.46 ng/dL     TRIIODOTHYRONINE(T3) 60 - 181 ng/dL     ENDO VITALS-[facility]   8/20/2020   Weight   94.666 kg   Weight   208 lb 11.2 oz   Height   70 in   BP   138/84   Pulse   71   Resp      BSA (Calculated - sq m)   2.16   BMI (Calculated)   29.94   Temp   98.1   Temp src   Oral   SpO2   100     END[facility] THYROID LABS[facility] Latest Ref Rng & Units 8/13/2020 7/16/2019   TSH 0.40 - 4.00 mU/L  1.05   T4 FREE 0.76 - 1.46 ng/dL     TRIIODOTHYRONINE(T3) 60 - 181 ng/dL     ENDO VITALS-[facility]  8/13/2020 7/16/2019   Weight  90.719 kg 90.583 kg   Weight  200 lb 199 lb 11.2 oz   Height  70 in    BP   130/88   Pulse   59   Resp      BSA (Calculated - sq m)  2.12    BMI (Calculated)  28.7    Temp      Temp src      SpO2          Assessment and plan:    This 50-year-old man with a history of radioactive iodine induced hypothyroidism returns for follow-up after being on an increased dose of T4 for about 1 month.  It is too soon to assess the impact of this dose on his biochemistry but symptomatically he seems unchanged.  He has worked out a plan to be sure he gets the right dose of medication on the right day.  We will plan to repeat his thyroid function tests at the end of December.  I will make adjustments in his dosing then.  Likely plan to see him in 1 year.    I spent a total of 12 minutes on the phone with him.    Jane Calvo MD

## 2020-11-02 ENCOUNTER — VIRTUAL VISIT (OUTPATIENT)
Dept: ENDOCRINOLOGY | Facility: CLINIC | Age: 50
End: 2020-11-02
Payer: COMMERCIAL

## 2020-11-02 DIAGNOSIS — E03.4 HYPOTHYROIDISM DUE TO ACQUIRED ATROPHY OF THYROID: Primary | ICD-10-CM

## 2020-11-02 PROCEDURE — 99213 OFFICE O/P EST LOW 20 MIN: CPT | Mod: TEL | Performed by: INTERNAL MEDICINE

## 2020-11-02 NOTE — LETTER
"11/2/2020       RE: Natalie Ny  [address] [facility] [address]     Dear Colleague,    Thank you for referring your patient, Natalie Ny, to the [facility] [place] at [facility]. Please see a copy of my visit note below.    Natalie Ny is a 50 year old male who is being evaluated via a billable telephone visit.      The patient has been notified of following:     \"This telephone visit will be conducted via a call between you and your physician/provider. We have found that certain health care needs can be provided without the need for a physical exam.  This service lets us provide the care you need with a short phone conversation.  If a prescription is necessary we can send it directly to your pharmacy.  If lab work is needed we can place an order for that and you can then stop by our lab to have the test done at a later time.    Telephone visits are billed at different rates depending on your insurance coverage. During this emergency period, for some insurers they may be billed the same as an in-person visit.  Please reach out to your insurance provider with any questions.    If during the course of the call the physician/provider feels a telephone visit is not appropriate, you will not be charged for this service.\"    Patient has given verbal consent for Telephone visit?  Yes    What phone number would you like to be contacted at? [phone]    How would you like to obtain your AVS? 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  He is status post radioactive iodine ablation in 2007, and has been on levothyroxine replacement ever since. He was  s on  200 daily until about a month ago. At that time he sent me a message that he was feeling cold and having drier skin than normal. He wondered if the dose " could be increased.  We decided he should take 200 mcg on 6 days of the week and 400 mcg on the 7th.  He has a new pill box that he uses for monitor his doses and with this help he has not had difficulty following this regimen.  He doesn't really feel any different.  .Generally he feels well.  Energy level is good.  He has no heat or cold intolerance.  He denies palpitations.  Weight is stable.  He has no problems with his eyes.    Current Outpatient Medications   Medication     diltiazem 2% in PLO gel     finasteride (PROPECIA) 1 MG tablet     levothyroxine (SYNTHROID/LEVOTHROID) 200 MCG tablet     nadolol (CORGARD) 20 MG tablet     ranitidine (ZANTAC) 150 MG tablet     terbinafine (LAMISIL) 250 MG tablet     No current facility-administered medications for this visit.      NAD    ENDO THYROID LABS-[facility] Latest Ref Rng & Units 9/17/2020 8/20/2020   TSH 0.40 - 4.00 mU/L 2.91    T4 FREE 0.76 - 1.46 ng/dL     TRIIODOTHYRONINE(T3) 60 - 181 ng/dL     ENDO VITALS[facility]   8/20/2020   Weight   94.666 kg   Weight   208 lb 11.2 oz   Height   70 in   BP   138/84   Pulse   71   Resp      BSA (Calculated - sq m)   2.16   BMI (Calculated)   29.94   Temp   98.1   Temp src   Oral   SpO2   100     ENDO THYROID LABS-[facility] Latest Ref Rng & Units 8/13/2020 7/16/2019   TSH 0.40 - 4.00 mU/L  1.05   T4 FREE 0.76 - 1.46 ng/dL     TRIIODOTHYRONINE(T3) 60 - 181 ng/dL     ENDO VITALS-[facility]  8/13/2020 7/16/2019   Weight  90.719 kg 90.583 kg   Weight  200 lb 199 lb 11.2 oz   Height  70 in    BP   130/88   Pulse   59   Resp      BSA (Calculated - sq m)  2.12    BMI (Calculated)  28.7    Temp      Temp src      SpO2          Assessment and plan:    This 50-year-old man with a history of radioactive iodine induced hypothyroidism returns for follow-up after being on an increased dose of T4 for about 1 month.  It is too soon to assess the impact of this dose on his biochemistry but symptomatically he seems unchanged.  He has worked out a plan to be sure he  gets the right dose of medication on the right day.  We will plan to repeat his thyroid function tests at the end of December.  I will make adjustments in his dosing then.  Likely plan to see him in 1 year.    I spent a total of 12 minutes on the phone with him.    Jane Calvo MD

## 2020-12-03 NOTE — TELEPHONE ENCOUNTER
Medication: levothyroxine (SYNTHROID/LEVOTHROID) 200 MCG tablet    Requested directions: same  Current directions on the medication list: Take 1tablet daily on 6 days of week and 2 tablets on the 7th day of the week    Last Written Prescription Date:  9/30/20  Last Fill Quantity: 96,   # refills: 3    Last Office Visit: 11/2/20  Future Office visit: none    Attending Provider: Jane Calvo MD  Last Clinic Note: 11/2/20    [facility] notified that prescription has been sent to [facility] - [place], [place] - [address]

## 2020-12-04 RX ORDER — LEVOTHYROXINE SODIUM 200 UG/1
TABLET ORAL
Qty: 96 TABLET | Refills: 3 | OUTPATIENT
Start: 2020-12-04

## 2020-12-05 ENCOUNTER — E-VISIT (OUTPATIENT)
Dept: URGENT CARE | Facility: URGENT CARE | Age: 50
End: 2020-12-05
Payer: COMMERCIAL

## 2020-12-05 DIAGNOSIS — Z20.822 PERSON UNDER INVESTIGATION FOR COVID-19: ICD-10-CM

## 2020-12-05 DIAGNOSIS — Z20.822 PERSON UNDER INVESTIGATION FOR COVID-19: Primary | ICD-10-CM

## 2020-12-05 PROCEDURE — 99421 OL DIG E/M SVC 5-10 MIN: CPT | Performed by: PHYSICIAN ASSISTANT

## 2020-12-05 PROCEDURE — U0003 INFECTIOUS AGENT DETECTION BY NUCLEIC ACID (DNA OR RNA); SEVERE ACUTE RESPIRATORY SYNDROME CORONAVIRUS 2 (SARS-COV-2) (CORONAVIRUS DISEASE [COVID-19]), AMPLIFIED PROBE TECHNIQUE, MAKING USE OF HIGH THROUGHPUT TECHNOLOGIES AS DESCRIBED BY CMS-2020-01-R: HCPCS | Performed by: PHYSICIAN ASSISTANT

## 2020-12-05 NOTE — PATIENT INSTRUCTIONS
"Discharge Instructions for COVID-19 Patients  You have--or may have--COVID-19. Please follow the instructions listed below.   If you have a weakened immune system, discuss with your doctor any other actions you need to take.  How can I protect others?  If you have symptoms (fever, cough, body aches or trouble breathing):    Stay home and away from others (self-isolate) until:  ? At least 10 days have passed since your symptoms started, And   ? You've had no fever--and no medicine that reduces fever--for 1 full day (24 hours), And    ? Your other symptoms have resolved (gotten better).  If you don't show symptoms, but testing showed that you have COVID-19:    Stay home and away from others (self-isolate). Follow the tips under \"How do I self-isolate?\" below for 10 days (20 days if you have a weak immune system).    You don't need to be retested for COVID-19 before going back to school or work. As long as you're fever-free and feeling better, you can go back to school, work and other activities after waiting the 10 or 20 days.   How do I self-isolate?    Stay in your own room, even for meals. Use your own bathroom if you can.    Stay away from others in your home. No hugging, kissing or shaking hands. No visitors.    Don't go to work, school or anywhere else.    Clean \"high touch\" surfaces often (doorknobs, counters, handles). Use household cleaning spray or wipes. You'll find a full list of  on the EPA website: www.epa.gov/pesticide-registration/list-n-disinfectants-use-against-sars-cov-2.    Cover your mouth and nose with a mask or other face covering to avoid spreading germs.    Wash your hands and face often. Use soap and water.    Caregivers in these groups are at risk for severe illness due to COVID-19:  ? People 65 years and older  ? People who live in a nursing home or long-term care facility  ? People with chronic disease (lung, heart, cancer, diabetes, kidney, liver, immunologic)  ? People who have a " weakened immune system, including those who:    Are in cancer treatment    Take medicine that weakens the immune system, such as corticosteroids    Had a bone marrow or organ transplant    Have an immune deficiency    Have poorly controlled HIV or AIDS    Are obese (body mass index of 40 or higher)    Smoke regularly    Caregivers should wear gloves while washing dishes, handling laundry and cleaning bedrooms and bathrooms.    Use caution when washing and drying laundry: Don't shake dirty laundry and use the warmest water setting that you can.    For more tips on managing your health at home, go to www.cdc.gov/coronavirus/2019-ncov/downloads/10Things.pdf.  How can I take care of myself at home?  1. Get lots of rest. Drink extra fluids (unless a doctor has told you not to).    2. Take Tylenol (acetaminophen) for fever or pain. If you have liver or kidney problems, ask your family doctor if it's okay to take Tylenol.     Adults can take either:  ? 650 mg (two 325 mg pills) every 4 to 6 hours, or   ? 1,000 mg (two 500 mg pills) every 8 hours as needed.  ? Note: Don't take more than 3,000 mg in one day. Acetaminophen is found in many medicines (both prescribed and over-the-counter medicines). Read all labels to be sure you don't take too much.   For children, check the Tylenol bottle for the right dose. The dose is based on the child's age or weight.  3. If you have other health problems (like cancer, heart failure, an organ transplant or severe kidney disease): Call your specialty clinic if you don't feel better in the next 2 days.    4. Know when to call 911. Emergency warning signs include:  ? Trouble breathing or shortness of breath  ? Pain or pressure in the chest that doesn't go away  ? Feeling confused like you haven't felt before, or not being able to wake up  ? Bluish-colored lips or face    5. Your doctor may have prescribed a blood thinner medicine. Follow their instructions.  Where can I get more  "information?    [facility] - About COVID-19: [email or web address]    CDC - What to Do If You're Sick: www.cdc.gov/coronavirus/2019-ncov/about/steps-when-sick.html    CDC - Ending Home Isolation: www.cdc.gov/coronavirus/2019-ncov/hcp/disposition-in-home-patients.html    CDC - Caring for Someone: www.cdc.gov/coronavirus/2019-ncov/if-you-are-sick/care-for-someone.html    [facility] - Interim Guidance for Hospital Discharge to Home: www.health.[facility].mn./diseases/coronavirus/hcp/hospdischarge.pdf    [facility] clinical trials (COVID-19 research studies): [email or web address].[facility]-clinical-trials    Below are the COVID-19 hotlines at the [place] Department of Health ([facility]). Interpreters are available.  ? For health questions: Call 597-104-6067 or 1-221.704.5146 (7 a.m. to 7 p.m.)  ? For questions about schools and childcare: Call 177-630-2901 or 1-794.675.6918 (7 a.m. to 7 p.m.)    For informational purposes only. Not to replace the advice of your health care provider. Clinically reviewed by the Infection Prevention Team. Copyright   2020 [facility]. All rights reserved. [employer] [id number] - REV 08/04/20.      Discharge Instructions for COVID-19 Patients  You have--or may have--COVID-19. Please follow the instructions listed below.   If you have a weakened immune system, discuss with your doctor any other actions you need to take.  How can I protect others?  If you have symptoms (fever, cough, body aches or trouble breathing):    Stay home and away from others (self-isolate) until:  ? At least 10 days have passed since your symptoms started, And   ? You've had no fever--and no medicine that reduces fever--for 1 full day (24 hours), And    ? Your other symptoms have resolved (gotten better).  If you don't show symptoms, but testing showed that you have COVID-19:    Stay home and away from others (self-isolate). Follow the tips under \"How do I self-isolate?\" below for 10 days (20 days " "if you have a weak immune system).    You don't need to be retested for COVID-19 before going back to school or work. As long as you're fever-free and feeling better, you can go back to school, work and other activities after waiting the 10 or 20 days.   How do I self-isolate?    Stay in your own room, even for meals. Use your own bathroom if you can.    Stay away from others in your home. No hugging, kissing or shaking hands. No visitors.    Don't go to work, school or anywhere else.    Clean \"high touch\" surfaces often (doorknobs, counters, handles). Use household cleaning spray or wipes. You'll find a full list of  on the EPA website: www.epa.gov/pesticide-registration/list-n-disinfectants-use-against-sars-cov-2.    Cover your mouth and nose with a mask or other face covering to avoid spreading germs.    Wash your hands and face often. Use soap and water.    Caregivers in these groups are at risk for severe illness due to COVID-19:  ? People 65 years and older  ? People who live in a nursing home or long-term care facility  ? People with chronic disease (lung, heart, cancer, diabetes, kidney, liver, immunologic)  ? People who have a weakened immune system, including those who:    Are in cancer treatment    Take medicine that weakens the immune system, such as corticosteroids    Had a bone marrow or organ transplant    Have an immune deficiency    Have poorly controlled HIV or AIDS    Are obese (body mass index of 40 or higher)    Smoke regularly    Caregivers should wear gloves while washing dishes, handling laundry and cleaning bedrooms and bathrooms.    Use caution when washing and drying laundry: Don't shake dirty laundry and use the warmest water setting that you can.    For more tips on managing your health at home, go to www.cdc.gov/coronavirus/2019-ncov/downloads/10Things.pdf.  How can I take care of myself at home?  6. Get lots of rest. Drink extra fluids (unless a doctor has told you not " to).    7. Take Tylenol (acetaminophen) for fever or pain. If you have liver or kidney problems, ask your family doctor if it's okay to take Tylenol.     Adults can take either:  ? 650 mg (two 325 mg pills) every 4 to 6 hours, or   ? 1,000 mg (two 500 mg pills) every 8 hours as needed.  ? Note: Don't take more than 3,000 mg in one day. Acetaminophen is found in many medicines (both prescribed and over-the-counter medicines). Read all labels to be sure you don't take too much.   For children, check the Tylenol bottle for the right dose. The dose is based on the child's age or weight.  8. If you have other health problems (like cancer, heart failure, an organ transplant or severe kidney disease): Call your specialty clinic if you don't feel better in the next 2 days.    9. Know when to call 911. Emergency warning signs include:  ? Trouble breathing or shortness of breath  ? Pain or pressure in the chest that doesn't go away  ? Feeling confused like you haven't felt before, or not being able to wake up  ? Bluish-colored lips or face    10. Your doctor may have prescribed a blood thinner medicine. Follow their instructions.  Where can I get more information?    [facility] - About COVID-19: [email or web address]    CDC - What to Do If You're Sick: www.cdc.gov/coronavirus/2019-ncov/about/steps-when-sick.html    CDC - Ending Home Isolation: www.cdc.gov/coronavirus/2019-ncov/hcp/disposition-in-home-patients.html    CDC - Caring for Someone: www.cdc.gov/coronavirus/2019-ncov/if-you-are-sick/care-for-someone.html    [facility] - Interim Guidance for Hospital Discharge to Home: www.health.[facility].[place].us/diseases/coronavirus/hcp/hospdischarge.pdf    [facility] clinical trials (COVID-19 research studies): clinicalaffairs.[facility]/umn-clinical-trials    Below are the COVID-19 hotlines at the [place] Department of Health ([facility]). Interpreters are available.  ? For health questions: Call 896-852-5401 or  1-803.502.9726 (7 a.m. to 7 p.m.)  ? For questions about schools and childcare: Call 797-776-4005 or 1-315.919.5271 (7 a.m. to 7 p.m.)    For informational purposes only. Not to replace the advice of your health care provider. Clinically reviewed by the Infection Prevention Team. Copyright   2020 [facility]. All rights reserved. [employer] [id number] - REV 08/04/20.

## 2020-12-06 LAB
SARS-COV-2 RNA SPEC QL NAA+PROBE: NOT DETECTED
SPECIMEN SOURCE: NORMAL

## 2020-12-13 ENCOUNTER — HEALTH MAINTENANCE LETTER (OUTPATIENT)
Age: 50
End: 2020-12-13

## 2021-04-17 ENCOUNTER — HEALTH MAINTENANCE LETTER (OUTPATIENT)
Age: 51
End: 2021-04-17

## 2021-07-02 RX ORDER — LEVOTHYROXINE SODIUM 200 UG/1
TABLET ORAL
Qty: 96 TABLET | Refills: 3 | Status: CANCELLED | OUTPATIENT
Start: 2021-07-02

## 2021-07-06 NOTE — TELEPHONE ENCOUNTER
Call to [facility] pharmacy after receiving paper refill request, message left of prescription having been sent to [facility] pharmacy 9/30/20 for 1 year, technician reports they did send medication to [facility] every 90 days.  Refill declined  Call to [facility], went to voice mail, no message left

## 2021-08-26 ENCOUNTER — LAB (OUTPATIENT)
Dept: LAB | Facility: CLINIC | Age: 51
End: 2021-08-26
Payer: COMMERCIAL

## 2021-08-26 DIAGNOSIS — E03.4 HYPOTHYROIDISM DUE TO ACQUIRED ATROPHY OF THYROID: ICD-10-CM

## 2021-08-26 DIAGNOSIS — E78.00 HIGH BLOOD CHOLESTEROL: ICD-10-CM

## 2021-08-26 LAB
CHOLEST SERPL-MCNC: 212 MG/DL
FASTING STATUS PATIENT QL REPORTED: NO
HBA1C MFR BLD: 5.8 % (ref 0–5.6)
HDLC SERPL-MCNC: 42 MG/DL
LDLC SERPL CALC-MCNC: 131 MG/DL
NONHDLC SERPL-MCNC: 170 MG/DL
T4 FREE SERPL-MCNC: 1.42 NG/DL (ref 0.76–1.46)
TRIGL SERPL-MCNC: 197 MG/DL
TSH SERPL DL<=0.005 MIU/L-ACNC: 0.03 MU/L (ref 0.4–4)

## 2021-08-26 PROCEDURE — 36415 COLL VENOUS BLD VENIPUNCTURE: CPT

## 2021-08-26 PROCEDURE — 84443 ASSAY THYROID STIM HORMONE: CPT

## 2021-08-26 PROCEDURE — 84439 ASSAY OF FREE THYROXINE: CPT

## 2021-08-26 PROCEDURE — 80061 LIPID PANEL: CPT

## 2021-08-26 PROCEDURE — 83036 HEMOGLOBIN GLYCOSYLATED A1C: CPT

## 2021-08-31 DIAGNOSIS — R55 SYNCOPE, UNSPECIFIED SYNCOPE TYPE: ICD-10-CM

## 2021-08-31 RX ORDER — NADOLOL 20 MG/1
20 TABLET ORAL DAILY
Qty: 90 TABLET | Refills: 0 | Status: SHIPPED | OUTPATIENT
Start: 2021-08-31 | End: 2021-12-02

## 2021-08-31 RX ORDER — NADOLOL 20 MG/1
20 TABLET ORAL DAILY
Qty: 90 TABLET | Refills: 3 | OUTPATIENT
Start: 2021-08-31

## 2021-08-31 NOTE — TELEPHONE ENCOUNTER
nadolol (CORGARD) 20 MG tablet  Last Written Prescription Date:  9/25/2020  Last Fill Quantity: 90,   # refills: 3  Last Office Visit : 8/27/2020  Future Office visit:  None    Routing refill request to provider for review/approval because:  90 Days sent to pharm 8/31/2021      Shawna Dillon RN  Central Triage Red Flags/Med Refills

## 2021-09-26 ENCOUNTER — HEALTH MAINTENANCE LETTER (OUTPATIENT)
Age: 51
End: 2021-09-26

## 2021-10-28 ENCOUNTER — MYC MEDICAL ADVICE (OUTPATIENT)
Dept: FAMILY MEDICINE | Facility: CLINIC | Age: 51
End: 2021-10-28

## 2021-10-28 DIAGNOSIS — E03.4 HYPOTHYROIDISM DUE TO ACQUIRED ATROPHY OF THYROID: Primary | ICD-10-CM

## 2021-10-28 DIAGNOSIS — R05.9 COUGH: Primary | ICD-10-CM

## 2021-10-29 RX ORDER — ALBUTEROL SULFATE 90 UG/1
2 AEROSOL, METERED RESPIRATORY (INHALATION) EVERY 6 HOURS PRN
Qty: 18 G | Refills: 0 | Status: SHIPPED | OUTPATIENT
Start: 2021-10-29 | End: 2021-11-26

## 2021-11-02 RX ORDER — LEVOTHYROXINE SODIUM 200 UG/1
TABLET ORAL
Qty: 96 TABLET | Refills: 3 | Status: SHIPPED | OUTPATIENT
Start: 2021-11-02 | End: 2022-07-07

## 2021-11-04 ENCOUNTER — MYC MEDICAL ADVICE (OUTPATIENT)
Dept: FAMILY MEDICINE | Facility: CLINIC | Age: 51
End: 2021-11-04

## 2021-11-04 DIAGNOSIS — R91.8 PULMONARY NODULES: ICD-10-CM

## 2021-11-04 DIAGNOSIS — R05.9 COUGH: Primary | ICD-10-CM

## 2021-11-05 ENCOUNTER — HOSPITAL ENCOUNTER (OUTPATIENT)
Dept: CT IMAGING | Facility: CLINIC | Age: 51
Discharge: HOME | End: 2021-11-05
Attending: FAMILY MEDICINE | Admitting: FAMILY MEDICINE
Payer: COMMERCIAL

## 2021-11-05 DIAGNOSIS — R05.9 COUGH: ICD-10-CM

## 2021-11-05 DIAGNOSIS — R91.8 PULMONARY NODULES: ICD-10-CM

## 2021-11-05 PROCEDURE — 71250 CT THORAX DX C-: CPT | Mod: 26 | Performed by: RADIOLOGY

## 2021-11-05 PROCEDURE — 71250 CT THORAX DX C-: CPT

## 2021-11-26 ENCOUNTER — OFFICE VISIT (OUTPATIENT)
Dept: FAMILY MEDICINE | Facility: CLINIC | Age: 51
End: 2021-11-26
Payer: COMMERCIAL

## 2021-11-26 VITALS
OXYGEN SATURATION: 98 % | WEIGHT: 194.6 LBS | SYSTOLIC BLOOD PRESSURE: 136 MMHG | HEART RATE: 51 BPM | HEIGHT: 70 IN | BODY MASS INDEX: 27.86 KG/M2 | DIASTOLIC BLOOD PRESSURE: 77 MMHG

## 2021-11-26 DIAGNOSIS — E78.00 HIGH CHOLESTEROL: Primary | ICD-10-CM

## 2021-11-26 DIAGNOSIS — E78.00 HIGH BLOOD CHOLESTEROL: ICD-10-CM

## 2021-11-26 DIAGNOSIS — R05.9 COUGH: ICD-10-CM

## 2021-11-26 DIAGNOSIS — K21.00 GASTROESOPHAGEAL REFLUX DISEASE WITH ESOPHAGITIS WITHOUT HEMORRHAGE: ICD-10-CM

## 2021-11-26 DIAGNOSIS — E03.9 ACQUIRED HYPOTHYROIDISM: ICD-10-CM

## 2021-11-26 DIAGNOSIS — L65.9 HAIR LOSS: ICD-10-CM

## 2021-11-26 PROCEDURE — 99396 PREV VISIT EST AGE 40-64: CPT | Performed by: FAMILY MEDICINE

## 2021-11-26 RX ORDER — MONTELUKAST SODIUM 10 MG/1
10 TABLET ORAL AT BEDTIME
Qty: 30 TABLET | Refills: 1 | Status: SHIPPED | OUTPATIENT
Start: 2021-11-26 | End: 2023-03-14

## 2021-11-26 RX ORDER — FINASTERIDE 1 MG/1
1 TABLET, FILM COATED ORAL DAILY
Qty: 90 TABLET | Refills: 3 | Status: SHIPPED | OUTPATIENT
Start: 2021-11-26 | End: 2023-04-14

## 2021-11-26 RX ORDER — ALBUTEROL SULFATE 90 UG/1
2 AEROSOL, METERED RESPIRATORY (INHALATION) EVERY 6 HOURS PRN
Qty: 18 G | Refills: 0 | Status: SHIPPED | OUTPATIENT
Start: 2021-11-26 | End: 2021-12-20

## 2021-11-26 ASSESSMENT — MIFFLIN-ST. JEOR: SCORE: 1748.95

## 2021-11-26 ASSESSMENT — PAIN SCALES - GENERAL: PAINLEVEL: NO PAIN (0)

## 2021-11-26 NOTE — NURSING NOTE
Chief Complaint   Patient presents with     Recheck Medication     Physical       SAVANNA Silva at 2:18 PM on 11/26/2021.

## 2021-11-26 NOTE — PATIENT INSTRUCTIONS
To schedule an lab appointment at the [facility], please call [phone].

## 2021-11-26 NOTE — PROGRESS NOTES
"    Assessment & Plan     High cholesterol  Recheck post healthier lifestyle attempts  - Lipid panel reflex to direct LDL Fasting; Future  - Hemoglobin A1c; Future  - Comprehensive metabolic panel; Future    Cough  Empiric singulair. Prn ventolin seems to help, as does gerd meds, so will rvw w/ pulm, allergy, GI in attempt to figure it out. Seasonal sounds allergy-usu late in year, but some worse post eat.  - Adult Allergy/Asthma Referral  - montelukast (SINGULAIR) 10 MG tablet; Take 1 tablet (10 mg) by mouth At Bedtime  - albuterol (PROAIR HFA/PROVENTIL HFA/VENTOLIN HFA) 108 (90 Base) MCG/ACT inhaler; Inhale 2 puffs into the lungs every 6 hours as needed for shortness of breath / dyspnea or wheezing    Gastroesophageal reflux disease with esophagitis without hemorrhage  Discuss  - Adult Gastro Ref - Consult Only; Future    Hair loss  Helps  - finasteride (PROPECIA) 1 MG tablet; Take 1 tablet (1 mg) by mouth daily    Acquired hypothyroidism    - finasteride (PROPECIA) 1 MG tablet; Take 1 tablet (1 mg) by mouth daily    High blood cholesterol    - finasteride (PROPECIA) 1 MG tablet; Take 1 tablet (1 mg) by mouth daily      42 minutes spent on the date of the encounter doing chart review, history and exam, documentation and further activities per the note       BMI:   Estimated body mass index is 27.92 kg/m  as calculated from the following:    Height as of this encounter: 1.778 m (5' 10\").    Weight as of this encounter: 88.3 kg (194 lb 9.6 oz).     No follow-ups on file.    Jed Knox MD  [facility] [place]    Dalton Estrada is a 51 year old who presents for the following health issues     HPI Here for a physical.  Cough; seems seasonal but also might be gerd component, gerd meds might provide some relief, ventolin too. Neg PFT times two but might not have been done when symptoms at worst. Sees new pulmaldonado CARDENAS soon. Given seasonal component will try empiric singulair and ask " "for allergy visit.  Due for labs, has been working on weight/lifestyle after last year higher hgba1c  Beta blocker for trembling in surgery might make cough worse which could go along w/ asthma.   Notes for some time might have slight umbilical protrusion, might be sore at times.  Mom  at 47 of autoimmune hepatitsi  Dad at 69 of PD  Colon due    Shots utd    [facility] sees endo  Pulm nodules no f/u needed  Remote syncope    No PSH     3 kids no smoke or etoh works at [employer]    Current Outpatient Medications   Medication     albuterol (PROAIR HFA/PROVENTIL HFA/VENTOLIN HFA) 108 (90 Base) MCG/ACT inhaler     diltiazem 2% in PLO gel     finasteride (PROPECIA) 1 MG tablet     levothyroxine (SYNTHROID/LEVOTHROID) 200 MCG tablet     montelukast (SINGULAIR) 10 MG tablet     nadolol (CORGARD) 20 MG tablet     ranitidine (ZANTAC) 150 MG tablet     terbinafine (LAMISIL) 250 MG tablet     No current facility-administered medications for this visit.     Allergies   Allergen Reactions     Amoxicillin Rash     Penicillins Rash     Propecia seems to stabilize hair loss          Review of Systems   Ten point ROS o/w neg  Objective    /77 (BP Location: Right arm, Patient Position: Sitting, Cuff Size: Adult Large)   Pulse 51   Ht 1.778 m (5' 10\")   Wt 88.3 kg (194 lb 9.6 oz)   SpO2 98%   BMI 27.92 kg/m    Body mass index is 27.92 kg/m .  Physical Exam   GENERAL: healthy, alert and no distress  EYES: Eyes grossly normal to inspection, PERRL and conjunctivae and sclerae normal  HENT: ear canals and TM's normal, nose and mouth without ulcers or lesions  NECK: no adenopathy, no asymmetry, masses, or scars and thyroid normal to palpation  RESP: lungs clear to auscultation - no rales, rhonchi or wheezes  CV: regular rate and rhythm, normal S1 S2, no S3 or S4, no murmur, click or rub, no peripheral edema and peripheral pulses strong  ABDOMEN: soft, nontender, no hepatosplenomegaly, no masses and bowel sounds normal, " one cm umbilical hernia reduces easily no pain   (male): normal male genitalia without lesions or urethral discharge, no hernia  MS: no gross musculoskeletal defects noted, no edema  SKIN: no suspicious lesions or rashes  NEURO: Normal strength and tone, mentation intact and speech normal  PSYCH: mentation appears normal, affect normal/bright

## 2021-11-29 ENCOUNTER — OFFICE VISIT (OUTPATIENT)
Dept: PULMONOLOGY | Facility: CLINIC | Age: 51
End: 2021-11-29
Attending: FAMILY MEDICINE
Payer: COMMERCIAL

## 2021-11-29 VITALS
BODY MASS INDEX: 28.35 KG/M2 | TEMPERATURE: 98.1 F | SYSTOLIC BLOOD PRESSURE: 144 MMHG | OXYGEN SATURATION: 99 % | HEIGHT: 70 IN | DIASTOLIC BLOOD PRESSURE: 77 MMHG | RESPIRATION RATE: 16 BRPM | HEART RATE: 61 BPM | WEIGHT: 198 LBS

## 2021-11-29 DIAGNOSIS — R91.8 PULMONARY NODULES: ICD-10-CM

## 2021-11-29 DIAGNOSIS — J45.20 MILD INTERMITTENT ASTHMA WITHOUT COMPLICATION: Primary | ICD-10-CM

## 2021-11-29 DIAGNOSIS — R05.9 COUGH: ICD-10-CM

## 2021-11-29 PROCEDURE — G0463 HOSPITAL OUTPT CLINIC VISIT: HCPCS

## 2021-11-29 PROCEDURE — 99204 OFFICE O/P NEW MOD 45 MIN: CPT | Performed by: INTERNAL MEDICINE

## 2021-11-29 RX ORDER — FLUTICASONE PROPIONATE 110 UG/1
1-2 AEROSOL, METERED RESPIRATORY (INHALATION) 2 TIMES DAILY
Qty: 12 G | Refills: 11 | Status: SHIPPED | OUTPATIENT
Start: 2021-11-29 | End: 2023-03-16

## 2021-11-29 ASSESSMENT — MIFFLIN-ST. JEOR: SCORE: 1764.37

## 2021-11-29 ASSESSMENT — PAIN SCALES - GENERAL: PAINLEVEL: NO PAIN (0)

## 2021-11-29 NOTE — PROGRESS NOTES
LUNG NODULE & INTERVENTIONAL PULMONARY CLINIC  CLINICS & SURGERY CENTER, [facility], [facility]     Natalie Ny MRN# [id number]   Age: 51 year old YOB: 1970       Requesting Physician: Jed Knox MD  [address] [place] [address]       Assessment and Plan:    1.  51-year-old man with cough variant asthma/post viral asthma.  Singulair is very reasonable but I think with Flovent he should have a more full resolution of his symptoms.  My recommendation is that he use Flovent once or twice a day on days that he is having symptoms.  He does not need to take it when he is not having symptoms.  If he develops sinus issues he can start over-the-counter sinus medications.  If he develops reflux he can go on a PPI short-term.  I recommended he not be on these medications long-term if his symptoms are well controlled.    2.  Lung nodules: These findings are stable and do not require further follow-up.    He will follow up with me as needed.           History:     Natalie Ny is a 51 year old male with sig for history of post viral cough who is here for evaluation/followup of cough.  Over the years he has developed cough after a cold.  It seems to happen at the same time of year-early winter.  He occasionally has some reflux and his cough has improved somewhat with the PPI.  He is also started on Singulair and this is improved symptoms.  He may have a little bit of sinus issues occasionally with these flares but none currently.  Fortunately his cough is improved with his Singulair.      - My interpretation of the images relevant for this visit includes: Bilateral stable nodules.             Past Medical History:   Reviewed.  Nothing significant.       Past Surgical History:    No past surgical history on file.       Social History:     Social History     Tobacco Use     Smoking status: Never Smoker     Smokeless tobacco: Never Used   Substance Use Topics  "    Alcohol use: Not on file          Family History:   No family history on file.        Allergies:      Allergies   Allergen Reactions     Amoxicillin Rash     Penicillins Rash          Medications:     Current Outpatient Medications   Medication Sig     albuterol (PROAIR HFA/PROVENTIL HFA/VENTOLIN HFA) 108 (90 Base) MCG/ACT inhaler Inhale 2 puffs into the lungs every 6 hours as needed for shortness of breath / dyspnea or wheezing     diltiazem 2% in PLO gel Apply small amount to anal opening three times daily for 8 weeks.     finasteride (PROPECIA) 1 MG tablet Take 1 tablet (1 mg) by mouth daily     fluticasone (FLOVENT HFA) 110 MCG/ACT inhaler Inhale 1-2 puffs into the lungs 2 times daily     levothyroxine (SYNTHROID/LEVOTHROID) 200 MCG tablet Take 1tablet daily on 6 days of week and 2 tablets on the 7th day of the week     montelukast (SINGULAIR) 10 MG tablet Take 1 tablet (10 mg) by mouth At Bedtime     nadolol (CORGARD) 20 MG tablet Take 1 tablet (20 mg) by mouth daily (Office visit and labs due before next refill. Pl call [phone] to schedule visit.) Thank you     ranitidine (ZANTAC) 150 MG tablet Take 150 mg by mouth as needed.     terbinafine (LAMISIL) 250 MG tablet Take 1 tablet (250 mg) by mouth daily (Patient not taking: Reported on 8/20/2020)     No current facility-administered medications for this visit.          Review of Systems:     See HPI         Physical Exam:   BP (!) 144/77   Pulse 61   Temp 98.1  F (36.7  C) (Oral)   Resp 16   Ht 1.778 m (5' 10\")   Wt 89.8 kg (198 lb)   SpO2 99%   BMI 28.41 kg/m      Constitutional - looks well, in no apparent distress  Eyes - no redness or discharge  Respiratory -breathing appears comfortable. No wheeze or rhonchi.   Cardiac -- Normal rate, rhythm.   Skin - No appreciable discoloration or lesions (very limited exam)  Neurological - No apparent tremors. Speech fluent and articlate  Psychiatric - no signs of delirium or anxiety          Current " Laboratory Data:   All laboratory and imaging data reviewed.    No results found for this or any previous visit (from the past 24 hour(s)).

## 2021-11-29 NOTE — LETTER
11/29/2021     RE: Natalie Ny  [address]  [place] [address]     Dear Colleague,    Thank you for referring your patient, Natalie Ny, to the [facility] at [facility] [place]. Please see a copy of my visit note below.    LUNG NODULE & INTERVENTIONAL PULMONARY CLINIC  CLINICS & SURGERY [facility]     Natalie Ny MRN# [id number]   Age: 51 year old YOB: 1970       Requesting Physician: Jed Knox MD  [address] [place] [address]       Assessment and Plan:    1.  51-year-old man with cough variant asthma/post viral asthma.  Singulair is very reasonable but I think with Flovent he should have a more full resolution of his symptoms.  My recommendation is that he use Flovent once or twice a day on days that he is having symptoms.  He does not need to take it when he is not having symptoms.  If he develops sinus issues he can start over-the-counter sinus medications.  If he develops reflux he can go on a PPI short-term.  I recommended he not be on these medications long-term if his symptoms are well controlled.    2.  Lung nodules: These findings are stable and do not require further follow-up.    He will follow up with me as needed.         History:     Natalie Ny is a 51 year old male with sig for history of post viral cough who is here for evaluation/followup of cough.  Over the years he has developed cough after a cold.  It seems to happen at the same time of year-early winter.  He occasionally has some reflux and his cough has improved somewhat with the PPI.  He is also started on Singulair and this is improved symptoms.  He may have a little bit of sinus issues occasionally with these flares but none currently.  Fortunately his cough is improved with his Singulair.      - My interpretation of the images relevant for this visit includes:  "Bilateral stable nodules.             Past Medical History:   Reviewed.  Nothing significant.       Past Surgical History:    No past surgical history on file.       Social History:     Social History     Tobacco Use     Smoking status: Never Smoker     Smokeless tobacco: Never Used   Substance Use Topics     Alcohol use: Not on file          Family History:   No family history on file.        Allergies:      Allergies   Allergen Reactions     Amoxicillin Rash     Penicillins Rash          Medications:     Current Outpatient Medications   Medication Sig     albuterol (PROAIR HFA/PROVENTIL HFA/VENTOLIN HFA) 108 (90 Base) MCG/ACT inhaler Inhale 2 puffs into the lungs every 6 hours as needed for shortness of breath / dyspnea or wheezing     diltiazem 2% in PLO gel Apply small amount to anal opening three times daily for 8 weeks.     finasteride (PROPECIA) 1 MG tablet Take 1 tablet (1 mg) by mouth daily     fluticasone (FLOVENT HFA) 110 MCG/ACT inhaler Inhale 1-2 puffs into the lungs 2 times daily     levothyroxine (SYNTHROID/LEVOTHROID) 200 MCG tablet Take 1tablet daily on 6 days of week and 2 tablets on the 7th day of the week     montelukast (SINGULAIR) 10 MG tablet Take 1 tablet (10 mg) by mouth At Bedtime     nadolol (CORGARD) 20 MG tablet Take 1 tablet (20 mg) by mouth daily (Office visit and labs due before next refill. Pl call [phone] to schedule visit.) Thank you     ranitidine (ZANTAC) 150 MG tablet Take 150 mg by mouth as needed.     terbinafine (LAMISIL) 250 MG tablet Take 1 tablet (250 mg) by mouth daily (Patient not taking: Reported on 8/20/2020)     No current facility-administered medications for this visit.          Review of Systems:     See HPI         Physical Exam:   BP (!) 144/77   Pulse 61   Temp 98.1  F (36.7  C) (Oral)   Resp 16   Ht 1.778 m (5' 10\")   Wt 89.8 kg (198 lb)   SpO2 99%   BMI 28.41 kg/m      Constitutional - looks well, in no apparent distress  Eyes - no redness or " discharge  Respiratory -breathing appears comfortable. No wheeze or rhonchi.   Cardiac -- Normal rate, rhythm.   Skin - No appreciable discoloration or lesions (very limited exam)  Neurological - No apparent tremors. Speech fluent and articlate  Psychiatric - no signs of delirium or anxiety          Current Laboratory Data:   All laboratory and imaging data reviewed.    No results found for this or any previous visit (from the past 24 hour(s)).     Again, thank you for allowing me to participate in the care of your patient.      Sincerely,    Jed Nolasco MD

## 2021-11-29 NOTE — NURSING NOTE
"Oncology Rooming Note    November 29, 2021 3:23 PM   Natalie Ny is a 51 year old male who presents for:    Chief Complaint   Patient presents with     Oncology Clinic Visit     [facility] NEW - PULMONARY NODULES     Initial Vitals: BP (!) 144/77   Pulse 61   Temp 98.1  F (36.7  C) (Oral)   Resp 16   Ht 1.778 m (5' 10\")   Wt 89.8 kg (198 lb)   SpO2 99%   BMI 28.41 kg/m   Estimated body mass index is 28.41 kg/m  as calculated from the following:    Height as of this encounter: 1.778 m (5' 10\").    Weight as of this encounter: 89.8 kg (198 lb). Body surface area is 2.11 meters squared.  No Pain (0) Comment: Data Unavailable   No LMP for male patient.  Allergies reviewed: Yes  Medications reviewed: Yes    Medications: Medication refills not needed today.  Pharmacy name entered into [facility]:    [id number] - [place], [place] - [address] [facility] & [address] [facility] - [place] - [facility] [address]   [place], [place] - [address]  [facility] [place] [id number] - [place] - [address]  [address]    Clinical concerns: No new concerns. Gaurav was notified.      Gerald Harris LPN            "

## 2021-12-01 DIAGNOSIS — R55 SYNCOPE, UNSPECIFIED SYNCOPE TYPE: ICD-10-CM

## 2021-12-02 NOTE — TELEPHONE ENCOUNTER
* nadolol (CORGARD) 20 MG tablet   Last Written Prescription Date:  8/31/21  Last Fill Quantity: 90,   # refills: 0  Last Office Visit : 8/27/20  Future Office visit:  1 year    Routing refill request to provider for review/approval because:  Blood pressure out of range, appt due  11/29/21 (!) 144/77 ( pulmonary visit)       (Office visit and labs due before next refill. Pl call [phone] to schedule visit.)

## 2021-12-03 RX ORDER — NADOLOL 20 MG/1
20 TABLET ORAL DAILY
Qty: 90 TABLET | Refills: 0 | Status: SHIPPED | OUTPATIENT
Start: 2021-12-03 | End: 2022-03-09

## 2021-12-16 DIAGNOSIS — R05.9 COUGH: ICD-10-CM

## 2021-12-20 RX ORDER — ALBUTEROL SULFATE 90 UG/1
2 AEROSOL, METERED RESPIRATORY (INHALATION) EVERY 6 HOURS PRN
Qty: 18 G | Refills: 10 | Status: SHIPPED | OUTPATIENT
Start: 2021-12-20 | End: 2023-03-17

## 2021-12-20 NOTE — TELEPHONE ENCOUNTER
Last Clinic Visit: 11/26/2021  [facility] [place]  Asthma is not on problem list

## 2022-01-05 NOTE — TELEPHONE ENCOUNTER
REFERRAL INFORMATION:    Referring Provider:  Dr. Jed Knox     Referring Clinic:  [facility]     Reason for Visit/Diagnosis: GERD      FUTURE VISIT INFORMATION:    Appointment Date: 3/31/2022    Appointment Time: 4:20 PM      NOTES STATUS DETAILS   OFFICE NOTE from Referring Provider Internal 11/26/2021 Office visit with Dr. Knox     OFFICE NOTE from Other Specialist Internal 11/29/2021 Office visit with Dr. Jed Nolasco ([facility])    HOSPITAL DISCHARGE SUMMARY/  ED VISITS N/A    OPERATIVE REPORT N/A    MEDICATION LIST Internal         ENDOSCOPY  N/A    COLONOSCOPY N/A    ERCP N/A    EUS N/A    STOOL TESTING N/A    PERTINENT LABS Internal    PATHOLOGY REPORTS (RELATED) N/A    IMAGING (CT, MRI, EGD, MRCP, Small Bowel Follow Through/SBT, MR/CT Enterography) N/A

## 2022-03-03 DIAGNOSIS — R55 SYNCOPE, UNSPECIFIED SYNCOPE TYPE: ICD-10-CM

## 2022-03-06 NOTE — TELEPHONE ENCOUNTER
nadolol (CORGARD) 20 MG    Last Written Prescription Date:  12/3/21  Last Fill Quantity: 90,   # refills: 0  Last Office Visit : 8/27/20  Future Office visit:  NONE  Routing refill request to provider for review/approval because:  Last note  CARD VISIT before Refills

## 2022-03-09 RX ORDER — NADOLOL 20 MG/1
20 TABLET ORAL DAILY
Qty: 90 TABLET | Refills: 0 | Status: SHIPPED | OUTPATIENT
Start: 2022-03-09 | End: 2022-06-06

## 2022-03-10 ENCOUNTER — TELEPHONE (OUTPATIENT)
Dept: CARDIOLOGY | Facility: CLINIC | Age: 52
End: 2022-03-10
Payer: COMMERCIAL

## 2022-03-10 NOTE — TELEPHONE ENCOUNTER
----- Message from Yesica Todd, RN sent at 3/9/2022 12:34 PM CST -----  Please call pt to schedule appointment with Dr Kirkland or Yohana Toribio NP, pt has not been seen since 2020, needs to be seen for further refills of medication.    Thanks  Yesica Todd, BSN, RN  Electrophysiology Nurse Coordinator

## 2022-03-14 ENCOUNTER — MYC MEDICAL ADVICE (OUTPATIENT)
Dept: GASTROENTEROLOGY | Facility: CLINIC | Age: 52
End: 2022-03-14
Payer: COMMERCIAL

## 2022-03-22 NOTE — TELEPHONE ENCOUNTER
Called to remind patient of their upcoming appointment with our GI clinic, on Thursday 3/31/2022 at 4:20PM with Dr. Rubin. This appointment is scheduled as a video visit. You will receive a call approximately 30 minutes prior to check you in, you must be in [place] for this visit., if your appointment is virtual (video or telephone) you need to be in [place] for the visit. To reschedule or cancel patient to call [phone].    Jennifer Devlin MA

## 2022-03-31 ENCOUNTER — PRE VISIT (OUTPATIENT)
Dept: GASTROENTEROLOGY | Facility: CLINIC | Age: 52
End: 2022-03-31

## 2022-04-28 ENCOUNTER — APPOINTMENT (OUTPATIENT)
Dept: LAB | Facility: CLINIC | Age: 52
End: 2022-04-28
Payer: COMMERCIAL

## 2022-05-02 ENCOUNTER — MYC MEDICAL ADVICE (OUTPATIENT)
Dept: FAMILY MEDICINE | Facility: CLINIC | Age: 52
End: 2022-05-02
Payer: COMMERCIAL

## 2022-05-05 ENCOUNTER — LAB (OUTPATIENT)
Dept: LAB | Facility: CLINIC | Age: 52
End: 2022-05-05
Payer: COMMERCIAL

## 2022-05-05 ENCOUNTER — MYC MEDICAL ADVICE (OUTPATIENT)
Dept: ENDOCRINOLOGY | Facility: CLINIC | Age: 52
End: 2022-05-05
Payer: COMMERCIAL

## 2022-05-05 DIAGNOSIS — E78.00 HIGH CHOLESTEROL: ICD-10-CM

## 2022-05-05 DIAGNOSIS — E03.4 HYPOTHYROIDISM DUE TO ACQUIRED ATROPHY OF THYROID: ICD-10-CM

## 2022-05-05 LAB
ALBUMIN SERPL-MCNC: 4 G/DL (ref 3.4–5)
ALP SERPL-CCNC: 37 U/L (ref 40–150)
ALT SERPL W P-5'-P-CCNC: 35 U/L (ref 0–70)
ANION GAP SERPL CALCULATED.3IONS-SCNC: 6 MMOL/L (ref 3–14)
AST SERPL W P-5'-P-CCNC: 21 U/L (ref 0–45)
BILIRUB SERPL-MCNC: 1.1 MG/DL (ref 0.2–1.3)
BUN SERPL-MCNC: 13 MG/DL (ref 7–30)
CALCIUM SERPL-MCNC: 9.3 MG/DL (ref 8.5–10.1)
CHLORIDE BLD-SCNC: 107 MMOL/L (ref 94–109)
CHOLEST SERPL-MCNC: 233 MG/DL
CO2 SERPL-SCNC: 26 MMOL/L (ref 20–32)
CREAT SERPL-MCNC: 0.95 MG/DL (ref 0.66–1.25)
FASTING STATUS PATIENT QL REPORTED: YES
GFR SERPL CREATININE-BSD FRML MDRD: >90 ML/MIN/1.73M2
GLUCOSE BLD-MCNC: 121 MG/DL (ref 70–99)
HBA1C MFR BLD: 6.4 % (ref 0–5.6)
HDLC SERPL-MCNC: 50 MG/DL
LDLC SERPL CALC-MCNC: 168 MG/DL
NONHDLC SERPL-MCNC: 183 MG/DL
POTASSIUM BLD-SCNC: 4.1 MMOL/L (ref 3.4–5.3)
PROT SERPL-MCNC: 7.7 G/DL (ref 6.8–8.8)
SODIUM SERPL-SCNC: 139 MMOL/L (ref 133–144)
TRIGL SERPL-MCNC: 74 MG/DL

## 2022-05-05 PROCEDURE — 84443 ASSAY THYROID STIM HORMONE: CPT

## 2022-05-05 PROCEDURE — 83036 HEMOGLOBIN GLYCOSYLATED A1C: CPT

## 2022-05-05 PROCEDURE — 80053 COMPREHEN METABOLIC PANEL: CPT

## 2022-05-05 PROCEDURE — 80061 LIPID PANEL: CPT

## 2022-05-05 PROCEDURE — 84439 ASSAY OF FREE THYROXINE: CPT

## 2022-05-05 PROCEDURE — 36415 COLL VENOUS BLD VENIPUNCTURE: CPT

## 2022-05-10 DIAGNOSIS — E03.4 HYPOTHYROIDISM DUE TO ACQUIRED ATROPHY OF THYROID: Primary | ICD-10-CM

## 2022-05-10 LAB
T4 FREE SERPL-MCNC: 1.42 NG/DL (ref 0.76–1.46)
TSH SERPL DL<=0.005 MIU/L-ACNC: 0.02 MU/L (ref 0.4–4)

## 2022-05-19 ENCOUNTER — VIRTUAL VISIT (OUTPATIENT)
Dept: CARDIOLOGY | Facility: CLINIC | Age: 52
End: 2022-05-19
Attending: INTERNAL MEDICINE
Payer: COMMERCIAL

## 2022-05-19 DIAGNOSIS — R25.1 TREMOR: ICD-10-CM

## 2022-05-19 DIAGNOSIS — R55 SYNCOPE AND COLLAPSE: Primary | ICD-10-CM

## 2022-05-19 PROCEDURE — 99213 OFFICE O/P EST LOW 20 MIN: CPT | Mod: 95 | Performed by: INTERNAL MEDICINE

## 2022-05-19 NOTE — PROGRESS NOTES
Natalie is a 51 year old who is being evaluated via a billable video visit.      How would you like to obtain your AVS? MyChart  If the video visit is dropped, the invitation should be resent by: Text to cell phone: [phone]   Will anyone else be joining your video visit? 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 He is followed annually to assure no recurrence or warning symptoms    Since his last visit he has had no recurrence of syncope.  He does use nadolol primarily for tremor reduction on days that he is doing surgery.    At this visit he feels well and has no new CV complaints apart from his having recently been noted to have somewhat elevated cholesterol during a visit with Dr. Barker.  Atorvastatin is being started.    No change in treatment plan at this time.     PAST MEDICAL HISTORY:  No past medical history on file.    CURRENT MEDICATIONS:  Current Outpatient Medications   Medication Sig Dispense Refill     albuterol (VENTOLIN HFA) 108 (90 Base) MCG/ACT inhaler Inhale 2 puffs into the lungs every 6 hours as needed for shortness of breath / dyspnea or wheezing 18 g 10     diltiazem 2% in PLO gel Apply small amount to anal opening three times daily for 8 weeks. 60 g 0     finasteride (PROPECIA) 1 MG tablet Take 1 tablet (1 mg) by mouth daily 90 tablet 3     fluticasone (FLOVENT HFA) 110 MCG/ACT inhaler Inhale 1-2 puffs into the lungs 2 times daily 12 g 11     levothyroxine (SYNTHROID/LEVOTHROID) 200 MCG tablet Take 1tablet daily on 6 days of week and 2 tablets on the 7th day of the week 96 tablet 3     montelukast (SINGULAIR) 10 MG tablet Take 1 tablet (10 mg) by mouth At Bedtime 30 tablet 1     nadolol (CORGARD) 20 MG tablet Take 1 tablet (20 mg) by mouth daily (Cardiology visit needed for further refills, call [phone] to schedule) 90 tablet 0     ranitidine (ZANTAC) 150 MG tablet Take 150 mg by mouth as needed.       terbinafine  (LAMISIL) 250 MG tablet Take 1 tablet (250 mg) by mouth daily (Patient not taking: Reported on 5/19/2022) 84 tablet 0       PAST SURGICAL HISTORY:  No past surgical history on file.    ALLERGIES:     Allergies   Allergen Reactions     Amoxicillin Rash     Penicillins Rash       FAMILY HISTORY:  No family history on file.      SOCIAL HISTORY:  Social History     Tobacco Use     Smoking status: Never Smoker     Smokeless tobacco: Never Used       ROS:   Constitutional: No fever, chills, or sweats. Weight stable.   ENT: No visual disturbance, ear ache, epistaxis, sore throat.   Cardiovascular: As per HPI.   Respiratory: No cough, hemoptysis.    GI: No nausea, vomiting, hematemesis, melena, or hematochezia.   : No hematuria.   Integument: Negative.   Psychiatric: Negative.   Hematologic: no easy bleeding.  Neuro: Negative see HPI.   Endocrinology: No significant heat or cold intolerance   Musculoskeletal: No myalgia see HPI.  Tremor.    Exam:  There were no vitals taken for this visit.  GENERAL APPEARANCE: healthy, alert and no distress  HEENT: , no central cyanosis  NECK: JVP not elevated  RESPIRATORY no rales, rhonchi or wheezes, no use of accessory muscles, no retractions, respirations are unlabored, normal respiratory rate  NEURO: alert and oriented to person/place/time, normal speech, gait and affect  SKIN: no ecchymoses, no rashes    Labs:  CBC RESULTS:   Lab Results   Component Value Date    WBC 6.1 10/22/2018    RBC 4.66 10/22/2018    HGB 14.1 10/22/2018    HCT 42.1 10/22/2018    MCV 90 10/22/2018    MCH 30.3 10/22/2018    MCHC 33.5 10/22/2018    RDW 12.3 10/22/2018     (L) 10/22/2018       BMP RESULTS:  Lab Results   Component Value Date     05/05/2022     01/29/2013    POTASSIUM 4.1 05/05/2022    POTASSIUM 4.8 01/29/2013    CHLORIDE 107 05/05/2022    CHLORIDE 102 01/29/2013    CO2 26 05/05/2022    CO2 30 01/29/2013    ANIONGAP 6 05/05/2022    ANIONGAP 11 01/29/2013     (H)  05/05/2022     (H) 01/29/2013    BUN 13 05/05/2022    BUN 13 01/29/2013    CR 0.95 05/05/2022    CR 0.92 01/29/2013    GFRESTIMATED >90 05/05/2022    GFRESTIMATED >90 01/29/2013    GFRESTBLACK >90 01/29/2013    KOBY 9.3 05/05/2022    KOBY 9.2 01/29/2013        INR RESULTS:  Lab Results   Component Value Date    INR 1.14 10/22/2018       Procedures:  PULMONARY FUNCTION TESTS:   PFT Latest Ref Rng & Units 12/7/2017   FVC L 5.16   FEV1 L 4.06   FVC% % 101   FEV1% % 100         ECHOCARDIOGRAM:   No results found for this or any previous visit (from the past 8760 hour(s)).      Assessment and Plan:   1.  Remote syncopal event-likely vasovagal without recurrence  2.  Hypercholesterolemia being evaluated by Dr. Knox  3.  Use of beta-blocker for tremor control when doing surgery    Plan  1.  No treatment change  2.  Follow-up in 1 year or earlier if needed    Video on 4: 45; video off 4: 53  Platform [employer]  Patient at home; clinic [facility] heart    Total elapsed time today with chart review, clinic visit and documentation 20 minutes    I very much appreciated the opportunity to see and assess Natalie Ny in the clinic today. Please do not hesitate to contact my office if you have any questions or concerns.      Javad Kirkland MD  Cardiac Arrhythmia Service  [facility]  [phone]      JAVAD COBURN

## 2022-05-19 NOTE — LETTER
5/19/2022      RE: Natalie Ny  [address]  [place] [address]       Dear Colleague,    Thank you for the opportunity to participate in the care of your patient, Natalie Ny, at the [facility]INIC [place] at [facility]. Please see a copy of my visit note below.    Natalie is a 51 year old who is being evaluated via a billable video visit.      How would you like to obtain your AVS? MyChart  If the video visit is dropped, the invitation should be resent by: Text to cell phone: [phone]   Will anyone else be joining your video visit? 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 He is followed annually to assure no recurrence or warning symptoms    Since his last visit he has had no recurrence of syncope.  He does use nadolol primarily for tremor reduction on days that he is doing surgery.    At this visit he feels well and has no new CV complaints apart from his having recently been noted to have somewhat elevated cholesterol during a visit with Dr. Barker.  Atorvastatin is being started.    No change in treatment plan at this time.     PAST MEDICAL HISTORY:  No past medical history on file.    CURRENT MEDICATIONS:  Current Outpatient Medications   Medication Sig Dispense Refill     albuterol (VENTOLIN HFA) 108 (90 Base) MCG/ACT inhaler Inhale 2 puffs into the lungs every 6 hours as needed for shortness of breath / dyspnea or wheezing 18 g 10     diltiazem 2% in PLO gel Apply small amount to anal opening three times daily for 8 weeks. 60 g 0     finasteride (PROPECIA) 1 MG tablet Take 1 tablet (1 mg) by mouth daily 90 tablet 3     fluticasone (FLOVENT HFA) 110 MCG/ACT inhaler Inhale 1-2 puffs into the lungs 2 times daily 12 g 11     levothyroxine (SYNTHROID/LEVOTHROID) 200 MCG tablet Take 1tablet daily on 6 days of week and 2 tablets on the 7th day of the week 96 tablet 3      montelukast (SINGULAIR) 10 MG tablet Take 1 tablet (10 mg) by mouth At Bedtime 30 tablet 1     nadolol (CORGARD) 20 MG tablet Take 1 tablet (20 mg) by mouth daily (Cardiology visit needed for further refills, call [phone] to schedule) 90 tablet 0     ranitidine (ZANTAC) 150 MG tablet Take 150 mg by mouth as needed.       terbinafine (LAMISIL) 250 MG tablet Take 1 tablet (250 mg) by mouth daily (Patient not taking: Reported on 5/19/2022) 84 tablet 0       PAST SURGICAL HISTORY:  No past surgical history on file.    ALLERGIES:     Allergies   Allergen Reactions     Amoxicillin Rash     Penicillins Rash       FAMILY HISTORY:  No family history on file.      SOCIAL HISTORY:  Social History     Tobacco Use     Smoking status: Never Smoker     Smokeless tobacco: Never Used       ROS:   Constitutional: No fever, chills, or sweats. Weight stable.   ENT: No visual disturbance, ear ache, epistaxis, sore throat.   Cardiovascular: As per HPI.   Respiratory: No cough, hemoptysis.    GI: No nausea, vomiting, hematemesis, melena, or hematochezia.   : No hematuria.   Integument: Negative.   Psychiatric: Negative.   Hematologic: no easy bleeding.  Neuro: Negative see HPI.   Endocrinology: No significant heat or cold intolerance   Musculoskeletal: No myalgia see HPI.  Tremor.    Exam:  There were no vitals taken for this visit.  GENERAL APPEARANCE: healthy, alert and no distress  HEENT: , no central cyanosis  NECK: JVP not elevated  RESPIRATORY no rales, rhonchi or wheezes, no use of accessory muscles, no retractions, respirations are unlabored, normal respiratory rate  NEURO: alert and oriented to person/place/time, normal speech, gait and affect  SKIN: no ecchymoses, no rashes    Labs:  CBC RESULTS:   Lab Results   Component Value Date    WBC 6.1 10/22/2018    RBC 4.66 10/22/2018    HGB 14.1 10/22/2018    HCT 42.1 10/22/2018    MCV 90 10/22/2018    MCH 30.3 10/22/2018    MCHC 33.5 10/22/2018    RDW 12.3 10/22/2018    PLT  139 (L) 10/22/2018       BMP RESULTS:  Lab Results   Component Value Date     05/05/2022     01/29/2013    POTASSIUM 4.1 05/05/2022    POTASSIUM 4.8 01/29/2013    CHLORIDE 107 05/05/2022    CHLORIDE 102 01/29/2013    CO2 26 05/05/2022    CO2 30 01/29/2013    ANIONGAP 6 05/05/2022    ANIONGAP 11 01/29/2013     (H) 05/05/2022     (H) 01/29/2013    BUN 13 05/05/2022    BUN 13 01/29/2013    CR 0.95 05/05/2022    CR 0.92 01/29/2013    GFRESTIMATED >90 05/05/2022    GFRESTIMATED >90 01/29/2013    GFRESTBLACK >90 01/29/2013    KOBY 9.3 05/05/2022    KOBY 9.2 01/29/2013        INR RESULTS:  Lab Results   Component Value Date    INR 1.14 10/22/2018       Procedures:  PULMONARY FUNCTION TESTS:   PFT Latest Ref Rng & Units 12/7/2017   FVC L 5.16   FEV1 L 4.06   FVC% % 101   FEV1% % 100         ECHOCARDIOGRAM:   No results found for this or any previous visit (from the past 8760 hour(s)).      Assessment and Plan:   1.  Remote syncopal event-likely vasovagal without recurrence  2.  Hypercholesterolemia being evaluated by Dr. Knox  3.  Use of beta-blocker for tremor control when doing surgery    Plan  1.  No treatment change  2.  Follow-up in 1 year or earlier if needed    Video on 4: 45; video off 4: 53  [facility]  Patient [facility]; clinic [facility] heart    Total elapsed time today with chart review, clinic visit and documentation 20 minutes    I very much appreciated the opportunity to see and assess Natalie Ny in the clinic today. Please do not hesitate to contact my office if you have any questions or concerns.      Javad Kirkland MD  Cardiac Arrhythmia Service  [facility]  [phone]

## 2022-05-19 NOTE — NURSING NOTE
Chief Complaint   Patient presents with     Follow Up     2 year, no questions while rooming, no other vitals to report today     Patient denies any changes regarding medication and allergies and states all information remains accurate since last reviewed.    Luigi Whitaker, OLY/CMA

## 2022-05-23 DIAGNOSIS — R73.03 PRE-DIABETES: Primary | ICD-10-CM

## 2022-05-23 NOTE — PATIENT INSTRUCTIONS
You were seen in the Electrophysiology Clinic today by: Dr Kirkland    Plan:     Follow up visit:  1 year with Dr Kirkland        Your Care Team:  EP Cardiology   Telephone Number     Nurse Line  Yesica Todd RN  [phone]     For scheduling appts or procedures:    Sheila Osorio   [phone]   For the Device Clinic (Pacemakers, ICDs, Loop Recorders)    During business hours: [phone]  After business hours:   [phone]- select option 4 and ask for job code [id number].     On-call cardiologist for after hours or on weekends: [phone], option #4, and ask to speak to the on-call cardiologist.     Cardiovascular Clinic:   [address] [place] [address]. [place] [address]      As always, Thank you for trusting us with your health care needs!      Duration Of Freeze Thaw-Cycle (Seconds): 0 Post-Care Instructions: I reviewed with the patient in detail post-care instructions. Patient is to wear sunprotection, and avoid picking at any of the treated lesions. Pt may apply Vaseline to crusted or scabbing areas. Render Post-Care Instructions In Note?: no Detail Level: Detailed Consent: The patient's consent was obtained including but not limited to risks of crusting, scabbing, blistering, scarring, darker or lighter pigmentary change, recurrence, incomplete removal and infection.

## 2022-06-03 DIAGNOSIS — R55 SYNCOPE, UNSPECIFIED SYNCOPE TYPE: ICD-10-CM

## 2022-06-06 RX ORDER — NADOLOL 20 MG/1
20 TABLET ORAL DAILY
Qty: 90 TABLET | Refills: 0 | Status: SHIPPED | OUTPATIENT
Start: 2022-06-06 | End: 2022-06-06

## 2022-06-06 RX ORDER — NADOLOL 20 MG/1
20 TABLET ORAL DAILY
Qty: 90 TABLET | Refills: 3 | Status: SHIPPED | OUTPATIENT
Start: 2022-06-06 | End: 2022-09-06

## 2022-07-07 ENCOUNTER — MYC MEDICAL ADVICE (OUTPATIENT)
Dept: ENDOCRINOLOGY | Facility: CLINIC | Age: 52
End: 2022-07-07

## 2022-07-07 DIAGNOSIS — E03.4 HYPOTHYROIDISM DUE TO ACQUIRED ATROPHY OF THYROID: ICD-10-CM

## 2022-07-07 RX ORDER — LEVOTHYROXINE SODIUM 200 UG/1
TABLET ORAL
Qty: 96 TABLET | Refills: 3 | Status: SHIPPED | OUTPATIENT
Start: 2022-07-07 | End: 2023-10-24

## 2022-07-18 ENCOUNTER — TELEPHONE (OUTPATIENT)
Dept: CARDIOLOGY | Facility: CLINIC | Age: 52
End: 2022-07-18

## 2022-07-18 ENCOUNTER — OFFICE VISIT (OUTPATIENT)
Dept: CARDIOLOGY | Facility: CLINIC | Age: 52
End: 2022-07-18
Attending: INTERNAL MEDICINE
Payer: COMMERCIAL

## 2022-07-18 VITALS
HEART RATE: 51 BPM | OXYGEN SATURATION: 99 % | DIASTOLIC BLOOD PRESSURE: 84 MMHG | SYSTOLIC BLOOD PRESSURE: 125 MMHG | HEIGHT: 71 IN | BODY MASS INDEX: 26.49 KG/M2 | WEIGHT: 189.2 LBS

## 2022-07-18 DIAGNOSIS — E78.01 FAMILIAL HYPERCHOLESTEROLEMIA: Primary | ICD-10-CM

## 2022-07-18 DIAGNOSIS — E05.00 GRAVES' DISEASE: ICD-10-CM

## 2022-07-18 DIAGNOSIS — R55 SYNCOPE, UNSPECIFIED SYNCOPE TYPE: ICD-10-CM

## 2022-07-18 DIAGNOSIS — J45.20 MILD INTERMITTENT ASTHMA WITHOUT COMPLICATION: ICD-10-CM

## 2022-07-18 PROCEDURE — 99214 OFFICE O/P EST MOD 30 MIN: CPT | Performed by: INTERNAL MEDICINE

## 2022-07-18 PROCEDURE — G0463 HOSPITAL OUTPT CLINIC VISIT: HCPCS

## 2022-07-18 ASSESSMENT — PAIN SCALES - GENERAL: PAINLEVEL: NO PAIN (0)

## 2022-07-18 NOTE — NURSING NOTE
July 18, 2022      Follow Up:   -Fasting lab work  -CT coronary calcium scan    -Follow up with Dr. Graham based on results      Patient verbalized understanding of all health information given and agreed to call with further questions or concerns.      Vanessa Sapp RN

## 2022-07-18 NOTE — PATIENT INSTRUCTIONS
July 18, 2022    Cardiology Provider You Saw During Your Visit: Dr. Graham      Medication Changes: None      Follow Up:   -Fasting lab work  -CT coronary calcium scan    -Follow up with Dr. Graham based on results      Follow the American Heart Association Diet and Lifestyle Recommendations:  -Limit saturated fat, trans fat, sodium, red meat, sweets and sugar-sweetened beverages. If you choose to eat red meat, compare labels and select the leanest cuts available.  -Aim for at least 150 minutes of moderate physical activity or 75 minutes of vigorous physical activity - or an equal combination of both - each week.      To Reach Us:  -During business hours: [phone], press option # 1 to schedule an appointment or to leave a message for your care team.     -After hours, weekends or holidays: [phone], press option #4 and ask to speak to the on-call cardiologist. Inform them you are a patient at the [facility].      Vanessa Sapp RN  Cardiology Care Coordinator - General Cardiology  [facility]

## 2022-07-18 NOTE — LETTER
7/18/2022      RE: Natalie Alexpamvirginiaheracliorad  [address]  [facility] [address]       Dear Colleague,    Thank you for the opportunity to participate in the care of your patient, Natalie Ny, at the [facility] [place] at [facility]. Please see a copy of my visit note below.    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 He does use nadolol primarily for tremor reduction on days that he is doing surgery.    Patient follows a heart healthy diet - based on plant rich diet.    He denies chest pain, palpitations (takes intermittently nadolol for tremor ), no shortness of breath - takes inhaler for bronchospam.  He has no intermittent claudication - he has never smoked.    Patient hs had an ablation of the thyroid because of auto-immune thyroiditis - takes now Synthroid 200 micrograms tablet.             PAST MEDICAL HISTORY:    Syncope,    Auto-immune thyroiditis     Asthma    CURRENT MEDICATIONS:  Current Outpatient Medications   Medication Sig Dispense Refill     albuterol (VENTOLIN HFA) 108 (90 Base) MCG/ACT inhaler Inhale 2 puffs into the lungs every 6 hours as needed for shortness of breath / dyspnea or wheezing 18 g 10     Continuous Blood Gluc  (FREESTYLE JAMES 2 READER) VENESSA 1 each See Admin Instructions Use per 's instructions for continuous glucose monitoring. 1 each 1     Continuous Blood Gluc Sensor (FREESTYLE JAMES 2 SENSOR) MISC 1 each See Admin Instructions Change every 14 days. 6 each 3     finasteride (PROPECIA) 1 MG tablet Take 1 tablet (1 mg) by mouth daily 90 tablet 3     fluticasone (FLOVENT HFA) 110 MCG/ACT inhaler Inhale 1-2 puffs into the lungs 2 times daily 12 g 11     levothyroxine  "(SYNTHROID/LEVOTHROID) 200 MCG tablet Take 1tablet daily on 6 days of week and 2 tablets on the 7th day of the week 96 tablet 3     nadolol (CORGARD) 20 MG tablet Take 1 tablet (20 mg) by mouth daily 90 tablet 3     ranitidine (ZANTAC) 150 MG tablet Take 150 mg by mouth as needed.       diltiazem 2% in PLO gel Apply small amount to anal opening three times daily for 8 weeks. (Patient not taking: Reported on 7/18/2022) 60 g 0     montelukast (SINGULAIR) 10 MG tablet Take 1 tablet (10 mg) by mouth At Bedtime (Patient not taking: Reported on 7/18/2022) 30 tablet 1       PAST SURGICAL HISTORY:  No past surgical history on file.    ALLERGIES     Allergies   Allergen Reactions     Amoxicillin Rash     Penicillins Rash       FAMILY HISTORY:  No family history on file.    SOCIAL HISTORY:  Social History     Socioeconomic History     Marital status:      Spouse name: None     Number of children: None     Years of education: None     Highest education level: None   Tobacco Use     Smoking status: Never Smoker     Smokeless tobacco: Never Used       ROS:   Constitutional: No fever, chills, or sweats. No weight gain/loss   ENT: No visual disturbance, ear ache, epistaxis, sore throat  Allergies/Immunologic: Negative.   Respiratory: No cough, hemoptysia  Cardiovascular: As per HPI  GI: No nausea, vomiting, hematemesis, melena, or hematochezia  : No urinary frequency, dysuria, or hematuria  Integument: Negative  Psychiatric: Negative  Neuro: Negative  Endocrinology: Negative   Musculoskeletal: Negative    EXAM:  /84 (BP Location: Right arm, Patient Position: Chair, Cuff Size: Adult Large)   Pulse 51   Ht 1.798 m (5' 10.79\")   Wt 85.8 kg (189 lb 3.2 oz)   SpO2 99%   BMI 26.55 kg/m    In general, the patient is a pleasant male in no apparent distress.    HEENT: NC/AT.  PERRLA.  EOMI.  Sclerae white, not injected.  Nares clear.  Pharynx without erythema or exudate.  Dentition intact.    Neck: No adenopathy.  No " thyromegaly. Carotids +4/4 bilaterally without bruits.  No jugular venous distension.   Heart: RRR. Normal S1, S2 splits physiologically. No murmur, rub, click, or gallop. The PMI is in the 5th ICS in the midclavicular line. There is no heave.    Lungs: CTA.  No ronchi, wheezes, rales.  No dullness to percussion.   Abdomen: Soft, nontender, nondistended. No organomegaly.  No bruits.   Extremities: No clubbing, cyanosis, or edema.  The pulses are +4/4 at the radial, brachial, femoral, popliteal, DP, and PT sites bilaterally.  No bruits are noted.  Neurologic: Alert and oriented to person/place/time, normal speech, gait and affect  Skin: No petechiae, purpura or rash.    Labs:  LIPID RESULTS:  Lab Results   Component Value Date    CHOL 233 (H) 05/05/2022    CHOL 188 10/19/2016    HDL 50 05/05/2022    HDL 50 10/19/2016     (H) 05/05/2022     (H) 10/19/2016    TRIG 74 05/05/2022    TRIG 70 10/19/2016    CHOLHDLRATIO 4.5 06/17/2014    NHDL 183 (H) 05/05/2022    NHDL 138 (H) 10/19/2016       LIVER ENZYME RESULTS:  Lab Results   Component Value Date    AST 21 05/05/2022    AST 24 07/30/2020    ALT 35 05/05/2022    ALT 44 07/30/2020       CBC RESULTS:  Lab Results   Component Value Date    WBC 6.1 10/22/2018    RBC 4.66 10/22/2018    HGB 14.1 10/22/2018    HCT 42.1 10/22/2018    MCV 90 10/22/2018    MCH 30.3 10/22/2018    MCHC 33.5 10/22/2018    RDW 12.3 10/22/2018     (L) 10/22/2018       BMP RESULTS:  Lab Results   Component Value Date     05/05/2022     01/29/2013    POTASSIUM 4.1 05/05/2022    POTASSIUM 4.8 01/29/2013    CHLORIDE 107 05/05/2022    CHLORIDE 102 01/29/2013    CO2 26 05/05/2022    CO2 30 01/29/2013    ANIONGAP 6 05/05/2022    ANIONGAP 11 01/29/2013     (H) 05/05/2022     (H) 01/29/2013    BUN 13 05/05/2022    BUN 13 01/29/2013    CR 0.95 05/05/2022    CR 0.92 01/29/2013    GFRESTIMATED >90 05/05/2022    GFRESTIMATED >90 01/29/2013    GFRESTBLACK >90 01/29/2013     KOBY 9.3 05/05/2022    KOBY 9.2 01/29/2013        A1C RESULTS:  Lab Results   Component Value Date    A1C 6.4 (H) 05/05/2022    A1C 5.7 10/19/2016       INR RESULTS:  Lab Results   Component Value Date    INR 1.14 10/22/2018       Procedures:    EKG (2019): sin bradycardia      Assessment and Plan:     We discussed the results with patient.  We discussed the importance of a heart healthy diet and lifestyle.    Medication Changes: None    Follow Up:   -Fasting lab work  We ordered a coronary  CT CAC.  In function of the results and lipids - we will tailor the lipid - lowering therapy.    Marcial Graham MD, PhD  Professor of Medicine  Division of Cardiology      CC  Patient Care Team:  Jed Knox MD as PCP - General (Family Practice)  Jane Calvo MD as MD (Internal Medicine)  Pavithra Muñiz RN as Specialty Care Coordinator (Clinical Cardiac Electrophysiology)  Jane Cherry APRN CNP as Nurse Practitioner (Nurse Practitioner)  Sabrina Sánchez DO as Resident (Student in organized health care education/training program)  Javad Kirkland MD as MD (Clinical Cardiac Electrophysiology)  Jed Knox MD as Assigned PCP  Dhaval Rubin DO as MD (Gastroenterology)  Jed Nolasco MD as Assigned Pulmonology Provider  Javad Kirkland MD as Assigned Heart and Vascular Provider  Desmond Mendoza MD as MD (Dermatology)      Please do not hesitate to contact me if you have any questions/concerns.     Sincerely,     Marcial Graham MD

## 2022-07-18 NOTE — NURSING NOTE
Chief Complaint   Patient presents with     New Patient     2 yr f/u for [facility]      Vitals were taken and medications reconciled.    Lul Cobb, EMT  3:31 PM

## 2022-07-23 ENCOUNTER — PRE VISIT (OUTPATIENT)
Dept: ORTHOPEDICS | Facility: CLINIC | Age: 52
End: 2022-07-23

## 2022-07-23 ENCOUNTER — OFFICE VISIT (OUTPATIENT)
Dept: ORTHOPEDICS | Facility: CLINIC | Age: 52
End: 2022-07-23
Payer: COMMERCIAL

## 2022-07-23 DIAGNOSIS — G89.29 CHRONIC LEFT SHOULDER PAIN: Primary | ICD-10-CM

## 2022-07-23 DIAGNOSIS — M25.512 CHRONIC LEFT SHOULDER PAIN: Primary | ICD-10-CM

## 2022-07-23 PROCEDURE — 99202 OFFICE O/P NEW SF 15 MIN: CPT | Performed by: FAMILY MEDICINE

## 2022-07-23 NOTE — PROGRESS NOTES
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  Natalie has been experiencing pain in his left inferior shoulder region for the past few months.  He feels this chiefly when doing a LAT pull down.  He feels this to a lesser degree when doing incline rows, although does feel it at this time.  This does not affect his daily life, this is his nondominant arm, however, when this does occur it is quite bothersome.  Unfortunately this has not improved whatsoever over the past few months despite resting and avoiding exacerbating activities.        Additional history: as documented    MEDICAL HISTORY  Patient Active Problem List   Diagnosis     Grave's disease     Syncope and collapse       Current Outpatient Medications   Medication Sig Dispense Refill     albuterol (VENTOLIN HFA) 108 (90 Base) MCG/ACT inhaler Inhale 2 puffs into the lungs every 6 hours as needed for shortness of breath / dyspnea or wheezing 18 g 10     Continuous Blood Gluc  (FREESTYLE JAMES 2 READER) VENESSA 1 each See Admin Instructions Use per 's instructions for continuous glucose monitoring. 1 each 1     Continuous Blood Gluc Sensor (FREESTYLE JAMES 2 SENSOR) MISC 1 each See Admin Instructions Change every 14 days. 6 each 3     diltiazem 2% in PLO gel Apply small amount to anal opening three times daily for 8 weeks. (Patient not taking: Reported on 7/18/2022) 60 g 0     finasteride (PROPECIA) 1 MG tablet Take 1 tablet (1 mg) by mouth daily 90 tablet 3     fluticasone (FLOVENT HFA) 110 MCG/ACT inhaler Inhale 1-2 puffs into the lungs 2 times daily 12 g 11     levothyroxine (SYNTHROID/LEVOTHROID) 200 MCG tablet Take 1tablet daily on 6 days of week and 2 tablets on the 7th day of the week 96 tablet 3     montelukast (SINGULAIR) 10 MG tablet Take 1 tablet (10 mg) by mouth At Bedtime (Patient not  taking: Reported on 7/18/2022) 30 tablet 1     nadolol (CORGARD) 20 MG tablet Take 1 tablet (20 mg) by mouth daily 90 tablet 3     ranitidine (ZANTAC) 150 MG tablet Take 150 mg by mouth as needed.         Allergies   Allergen Reactions     Amoxicillin Rash     Penicillins Rash       No family history on file.    Additional medical/Social/Surgical histories reviewed in [facility] and updated as appropriate.        PHYSICAL EXAM  General  - normal appearance, in no obvious distress  Musculoskeletal - left shoulder  - inspection: normal bone and joint alignment, no obvious deformity, no scapular winging, no AC step-off  - palpation: mildly tender insertional latissimus  - ROM:  180 deg flexion   45 deg extension   150 deg abduction   90 deg ER   70 deg IR  - strength: 5/5  strength, 5/5 in all shoulder planes  - special tests:  (-) Speed's  (-) Neer  (-) Hawkin's  (-) Bandar  (-) Lawn's  (-) subscap lift-off  Neuro  - no sensory or motor deficit, grossly normal coordination, normal muscle tone                   ASSESSMENT & PLAN  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    I ordered and independently reviewed an x-ray of his shoulder, this shows no discernible osteoarthritis or other acute or chronic issues.    His symptoms do seem to be muscular in nature, I suspect that this may be a injury to the teres minor or to the insertional latissimus.    I am referring him to physical therapy, I do think he will do well with focused treatment over the next few weeks.    If his symptoms or not improving whatsoever I would likely recommend an MR in the coming weeks.    It was a pleasure seeing Natalie today.    Javad Ferrell DO, [facility]  Primary Care Sports Medicine      This note was constructed using Dragon dictation software, please excuse any minor errors in spelling, grammar, or syntax.

## 2022-07-24 NOTE — PROGRESS NOTES
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 He does use nadolol primarily for tremor reduction on days that he is doing surgery.    Patient follows a heart healthy diet - based on plant rich diet.    He denies chest pain, palpitations (takes intermittently nadolol for tremor ), no shortness of breath - takes inhaler for bronchospam.  He has no intermittent claudication - he has never smoked.    Patient hs had an ablation of the thyroid because of auto-immune thyroiditis - takes now Synthroid 200 micrograms tablet.             PAST MEDICAL HISTORY:    Syncope,    Auto-immune thyroiditis     Asthma    CURRENT MEDICATIONS:  Current Outpatient Medications   Medication Sig Dispense Refill     albuterol (VENTOLIN HFA) 108 (90 Base) MCG/ACT inhaler Inhale 2 puffs into the lungs every 6 hours as needed for shortness of breath / dyspnea or wheezing 18 g 10     Continuous Blood Gluc  (FREESTYLE JAMES 2 READER) VENESSA 1 each See Admin Instructions Use per 's instructions for continuous glucose monitoring. 1 each 1     Continuous Blood Gluc Sensor (FREESTYLE JAMES 2 SENSOR) MISC 1 each See Admin Instructions Change every 14 days. 6 each 3     finasteride (PROPECIA) 1 MG tablet Take 1 tablet (1 mg) by mouth daily 90 tablet 3     fluticasone (FLOVENT HFA) 110 MCG/ACT inhaler Inhale 1-2 puffs into the lungs 2 times daily 12 g 11     levothyroxine (SYNTHROID/LEVOTHROID) 200 MCG tablet Take 1tablet daily on 6 days of week and 2 tablets on the 7th day of the week 96 tablet 3     nadolol (CORGARD) 20 MG tablet Take 1 tablet (20 mg) by mouth daily 90 tablet 3     ranitidine (ZANTAC) 150 MG tablet Take 150 mg by mouth as needed.       diltiazem 2% in PLO gel Apply small amount to anal opening three times daily  "for 8 weeks. (Patient not taking: Reported on 7/18/2022) 60 g 0     montelukast (SINGULAIR) 10 MG tablet Take 1 tablet (10 mg) by mouth At Bedtime (Patient not taking: Reported on 7/18/2022) 30 tablet 1       PAST SURGICAL HISTORY:  No past surgical history on file.    ALLERGIES     Allergies   Allergen Reactions     Amoxicillin Rash     Penicillins Rash       FAMILY HISTORY:  No family history on file.    SOCIAL HISTORY:  Social History     Socioeconomic History     Marital status:      Spouse name: None     Number of children: None     Years of education: None     Highest education level: None   Tobacco Use     Smoking status: Never Smoker     Smokeless tobacco: Never Used       ROS:   Constitutional: No fever, chills, or sweats. No weight gain/loss   ENT: No visual disturbance, ear ache, epistaxis, sore throat  Allergies/Immunologic: Negative.   Respiratory: No cough, hemoptysia  Cardiovascular: As per HPI  GI: No nausea, vomiting, hematemesis, melena, or hematochezia  : No urinary frequency, dysuria, or hematuria  Integument: Negative  Psychiatric: Negative  Neuro: Negative  Endocrinology: Negative   Musculoskeletal: Negative    EXAM:  /84 (BP Location: Right arm, Patient Position: Chair, Cuff Size: Adult Large)   Pulse 51   Ht 1.798 m (5' 10.79\")   Wt 85.8 kg (189 lb 3.2 oz)   SpO2 99%   BMI 26.55 kg/m    In general, the patient is a pleasant male in no apparent distress.    HEENT: NC/AT.  PERRLA.  EOMI.  Sclerae white, not injected.  Nares clear.  Pharynx without erythema or exudate.  Dentition intact.    Neck: No adenopathy.  No thyromegaly. Carotids +4/4 bilaterally without bruits.  No jugular venous distension.   Heart: RRR. Normal S1, S2 splits physiologically. No murmur, rub, click, or gallop. The PMI is in the 5th ICS in the midclavicular line. There is no heave.    Lungs: CTA.  No ronchi, wheezes, rales.  No dullness to percussion.   Abdomen: Soft, nontender, nondistended. No " organomegaly.  No bruits.   Extremities: No clubbing, cyanosis, or edema.  The pulses are +4/4 at the radial, brachial, femoral, popliteal, DP, and PT sites bilaterally.  No bruits are noted.  Neurologic: Alert and oriented to person/place/time, normal speech, gait and affect  Skin: No petechiae, purpura or rash.    Labs:  LIPID RESULTS:  Lab Results   Component Value Date    CHOL 233 (H) 05/05/2022    CHOL 188 10/19/2016    HDL 50 05/05/2022    HDL 50 10/19/2016     (H) 05/05/2022     (H) 10/19/2016    TRIG 74 05/05/2022    TRIG 70 10/19/2016    CHOLHDLRATIO 4.5 06/17/2014    NHDL 183 (H) 05/05/2022    NHDL 138 (H) 10/19/2016       LIVER ENZYME RESULTS:  Lab Results   Component Value Date    AST 21 05/05/2022    AST 24 07/30/2020    ALT 35 05/05/2022    ALT 44 07/30/2020       CBC RESULTS:  Lab Results   Component Value Date    WBC 6.1 10/22/2018    RBC 4.66 10/22/2018    HGB 14.1 10/22/2018    HCT 42.1 10/22/2018    MCV 90 10/22/2018    MCH 30.3 10/22/2018    MCHC 33.5 10/22/2018    RDW 12.3 10/22/2018     (L) 10/22/2018       BMP RESULTS:  Lab Results   Component Value Date     05/05/2022     01/29/2013    POTASSIUM 4.1 05/05/2022    POTASSIUM 4.8 01/29/2013    CHLORIDE 107 05/05/2022    CHLORIDE 102 01/29/2013    CO2 26 05/05/2022    CO2 30 01/29/2013    ANIONGAP 6 05/05/2022    ANIONGAP 11 01/29/2013     (H) 05/05/2022     (H) 01/29/2013    BUN 13 05/05/2022    BUN 13 01/29/2013    CR 0.95 05/05/2022    CR 0.92 01/29/2013    GFRESTIMATED >90 05/05/2022    GFRESTIMATED >90 01/29/2013    GFRESTBLACK >90 01/29/2013    KOBY 9.3 05/05/2022    KOBY 9.2 01/29/2013        A1C RESULTS:  Lab Results   Component Value Date    A1C 6.4 (H) 05/05/2022    A1C 5.7 10/19/2016       INR RESULTS:  Lab Results   Component Value Date    INR 1.14 10/22/2018       Procedures:    EKG (2019): sin bradycardia      Assessment and Plan:     We discussed the results with patient.  We discussed  the importance of a heart healthy diet and lifestyle.    Medication Changes: None    Follow Up:   -Fasting lab work  We ordered a coronary  CT CAC.  In function of the results and lipids - we will tailor the lipid - lowering therapy.    Marcial Graham MD, PhD  Professor of Medicine  Division of Cardiology      CC  Patient Care Team:  Jed Knox MD as PCP - General (Family Practice)  Jane Calvo MD as MD (Internal Medicine)  Pavithra Muñiz RN as Specialty Care Coordinator (Clinical Cardiac Electrophysiology)  Jane Cherry APRN CNP as Nurse Practitioner (Nurse Practitioner)  Sabrina Sánchez DO as Resident (Student in organized health care education/training program)  Javad Kirkland MD as MD (Clinical Cardiac Electrophysiology)  Jed Knox MD as Assigned PCP  Dhaval Rubin DO as MD (Gastroenterology)  Jed Nolasco MD as Assigned Pulmonology Provider  Javad Kirkland MD as Assigned Heart and Vascular Provider  Desmond Mendoza MD as MD (Dermatology)  JED KNOX

## 2022-07-25 ENCOUNTER — LAB (OUTPATIENT)
Dept: LAB | Facility: CLINIC | Age: 52
End: 2022-07-25
Payer: COMMERCIAL

## 2022-07-25 LAB
APO A-I SERPL-MCNC: 8 MG/DL
CHOLEST SERPL-MCNC: 205 MG/DL
FASTING STATUS PATIENT QL REPORTED: YES
HDLC SERPL-MCNC: 45 MG/DL
LDLC SERPL CALC-MCNC: 132 MG/DL
LDLC SERPL DIRECT ASSAY-MCNC: 140 MG/DL
NONHDLC SERPL-MCNC: 160 MG/DL
TRIGL SERPL-MCNC: 138 MG/DL

## 2022-07-25 PROCEDURE — 36415 COLL VENOUS BLD VENIPUNCTURE: CPT

## 2022-07-25 PROCEDURE — 83721 ASSAY OF BLOOD LIPOPROTEIN: CPT

## 2022-07-25 PROCEDURE — 80061 LIPID PANEL: CPT

## 2022-07-25 PROCEDURE — 83695 ASSAY OF LIPOPROTEIN(A): CPT

## 2022-07-29 ENCOUNTER — ANCILLARY PROCEDURE (OUTPATIENT)
Dept: CT IMAGING | Facility: CLINIC | Age: 52
End: 2022-07-29
Attending: INTERNAL MEDICINE

## 2022-07-29 DIAGNOSIS — Z13.6 ENCOUNTER FOR SCREENING FOR CORONARY ARTERY DISEASE: ICD-10-CM

## 2022-07-29 PROCEDURE — 75571 CT HRT W/O DYE W/CA TEST: CPT | Mod: GC | Performed by: INTERNAL MEDICINE

## 2022-08-31 DIAGNOSIS — R55 SYNCOPE, UNSPECIFIED SYNCOPE TYPE: ICD-10-CM

## 2022-09-06 RX ORDER — NADOLOL 20 MG/1
20 TABLET ORAL DAILY
Qty: 90 TABLET | Refills: 0 | Status: SHIPPED | OUTPATIENT
Start: 2022-09-06

## 2022-11-13 ENCOUNTER — MYC MEDICAL ADVICE (OUTPATIENT)
Dept: ENDOCRINOLOGY | Facility: CLINIC | Age: 52
End: 2022-11-13

## 2022-11-13 DIAGNOSIS — R73.03 PRE-DIABETES: Primary | ICD-10-CM

## 2022-11-14 ENCOUNTER — OFFICE VISIT (OUTPATIENT)
Dept: DERMATOLOGY | Facility: CLINIC | Age: 52
End: 2022-11-14
Payer: COMMERCIAL

## 2022-11-14 DIAGNOSIS — Z51.81 THERAPEUTIC DRUG MONITORING: ICD-10-CM

## 2022-11-14 DIAGNOSIS — B35.1 ONYCHOMYCOSIS: Primary | ICD-10-CM

## 2022-11-14 LAB
ALBUMIN SERPL-MCNC: 4.1 G/DL (ref 3.4–5)
ALP SERPL-CCNC: 34 U/L (ref 40–150)
ALT SERPL W P-5'-P-CCNC: 36 U/L (ref 0–70)
AST SERPL W P-5'-P-CCNC: 22 U/L (ref 0–45)
BASOPHILS # BLD AUTO: 0.1 10E3/UL (ref 0–0.2)
BASOPHILS NFR BLD AUTO: 1 %
BILIRUB DIRECT SERPL-MCNC: 0.1 MG/DL (ref 0–0.2)
BILIRUB SERPL-MCNC: 0.4 MG/DL (ref 0.2–1.3)
EOSINOPHIL # BLD AUTO: 0.2 10E3/UL (ref 0–0.7)
EOSINOPHIL NFR BLD AUTO: 2 %
ERYTHROCYTE [DISTWIDTH] IN BLOOD BY AUTOMATED COUNT: 12.1 % (ref 10–15)
HCT VFR BLD AUTO: 41.7 % (ref 40–53)
HGB BLD-MCNC: 14.1 G/DL (ref 13.3–17.7)
IMM GRANULOCYTES # BLD: 0 10E3/UL
IMM GRANULOCYTES NFR BLD: 0 %
LYMPHOCYTES # BLD AUTO: 2.4 10E3/UL (ref 0.8–5.3)
LYMPHOCYTES NFR BLD AUTO: 35 %
MCH RBC QN AUTO: 30.1 PG (ref 26.5–33)
MCHC RBC AUTO-ENTMCNC: 33.8 G/DL (ref 31.5–36.5)
MCV RBC AUTO: 89 FL (ref 78–100)
MONOCYTES # BLD AUTO: 0.6 10E3/UL (ref 0–1.3)
MONOCYTES NFR BLD AUTO: 8 %
NEUTROPHILS # BLD AUTO: 3.7 10E3/UL (ref 1.6–8.3)
NEUTROPHILS NFR BLD AUTO: 54 %
NRBC # BLD AUTO: 0 10E3/UL
NRBC BLD AUTO-RTO: 0 /100
PLATELET # BLD AUTO: 142 10E3/UL (ref 150–450)
PROT SERPL-MCNC: 7.5 G/DL (ref 6.8–8.8)
RBC # BLD AUTO: 4.68 10E6/UL (ref 4.4–5.9)
WBC # BLD AUTO: 6.9 10E3/UL (ref 4–11)

## 2022-11-14 PROCEDURE — 36415 COLL VENOUS BLD VENIPUNCTURE: CPT | Performed by: DERMATOLOGY

## 2022-11-14 PROCEDURE — 80076 HEPATIC FUNCTION PANEL: CPT | Performed by: DERMATOLOGY

## 2022-11-14 PROCEDURE — 99214 OFFICE O/P EST MOD 30 MIN: CPT | Performed by: DERMATOLOGY

## 2022-11-14 PROCEDURE — 85025 COMPLETE CBC W/AUTO DIFF WBC: CPT | Performed by: DERMATOLOGY

## 2022-11-14 ASSESSMENT — PAIN SCALES - GENERAL: PAINLEVEL: NO PAIN (0)

## 2022-11-14 NOTE — PROGRESS NOTES
[facility] Dermatology Note  Encounter Date: Nov 14, 2022  Office Visit     Dermatology Problem List:  1. Onychomycosis vs traumatic changes to the toenails  - left great toe, s/p nail clipping 8/7/19  - terbinafine 250 mg po every day x 12 weeks initiated 7/29/20, reinitiated 11/14/22  - baseline LFTs ordered 7/29/20    ____________________________________________    Assessment & Plan:    # Onychomycosis - confirmed via nail clipping and positive PAS 8/7/19. Chronic, flare/not at goal.   Reviewed further treatment with oral terbinafine, reviewed minimal liver risks. Reviewed side effects including GI upset and taste disturbance. Did review medication list for possible interactions.   - Plan to start terbinafine 250 mg daily x 3 months  - Baseline labs ordered: hepatic panel, CBC with diff     # Slightly hyperkeratotic tender papule on the left plantar foot, DDx early clavus vs less likely foreign body. Not particularly bothersome at this time, consider biopsy on follow up if not improved.   - Recommended corn pad with salicylic acid    Procedures Performed:   None.    Follow-up: 6 month(s) in-person, or earlier for new or changing lesions    Staff and Scribe:     Scribe Disclosure:   I, Jonathon uA, am serving as a scribe to document services personally performed by this physician, Dr. Desmond Mendoza, based on data collection and the provider's statements to me.     Provider Disclosure:   The documentation recorded by the scribe accurately reflects the services I personally performed and the decisions made by me.    Desmond Mendoza MD    Department of Dermatology  [facility] [place] [facility] [place]: Phone: [phone], Fax:[phone]  [facility]: Phone: [phone] Fax: [phone]  ____________________________________________    CC: RECHECK (Onychomycosis - did not start  terbinafine)    HPI:  Mr. Natalie Ny is a(n) 52 year old male who presents today as a return patient for a spot check.    Last seen 7/29/20 by Nidia Agarwal PA-C for nail fungus. At that time, patient was instructed to start terbinafine 250 mg by mouth every day for 12 weeks.    Today, he notes that he has not started terbinafine from his previous visit with Nidia Agarwal. He notes that the nail fungus had not been improving, so he has been trying topical terbinafine, which has helped some.   He also notes a potential foreign body on the bottom of the left foot with some tenderness to pressure. It first occurred a month or two ago and interferes with the way he steps.    The patient otherwise denies any new or concerning lesions. No bleeding, painful, pruritic, or changing lesions. Health otherwise stable. No other skin concerns.       Labs Reviewed:  N/A    Physical Exam:  Vitals: There were no vitals taken for this visit.  SKIN: Focused examination of left foot was performed.  - Left great toe with yellow discoloration, nail thickening, and subungual debris.   - Yellow discoloration and nail thickening of bilateral toenails 10/10.   - Slightly hyperkeratotic tender papule on the left plantar foot.   - No other lesions of concern on areas examined.     Medications:  Current Outpatient Medications   Medication     albuterol (VENTOLIN HFA) 108 (90 Base) MCG/ACT inhaler     Continuous Blood Gluc  (FREESTYLE JAMES 2 READER) VENESSA     Continuous Blood Gluc Sensor (FREESTYLE JAMES 2 SENSOR) MISC     diltiazem 2% in PLO gel     finasteride (PROPECIA) 1 MG tablet     fluticasone (FLOVENT HFA) 110 MCG/ACT inhaler     levothyroxine (SYNTHROID/LEVOTHROID) 200 MCG tablet     montelukast (SINGULAIR) 10 MG tablet     nadolol (CORGARD) 20 MG tablet     ranitidine (ZANTAC) 150 MG tablet     No current facility-administered medications for this visit.      Past Medical History:   Patient Active Problem List    Diagnosis     Grave's disease     Syncope and collapse     No past medical history on file.

## 2022-11-14 NOTE — LETTER
11/14/2022         RE: Natalie Ny  [address] [facility] [address]        Dear Colleague,    Thank you for referring your patient, Natalie Ny, to the [facility]. Please see a copy of my visit note below.    [facility] Dermatology Note  Encounter Date: Nov 14, 2022  Office Visit     Dermatology Problem List:  1. Onychomycosis vs traumatic changes to the toenails  - left great toe, s/p nail clipping 8/7/19  - terbinafine 250 mg po every day x 12 weeks initiated 7/29/20, reinitiated 11/14/22  - baseline LFTs ordered 7/29/20    ____________________________________________    Assessment & Plan:    # Onychomycosis - confirmed via nail clipping and positive PAS 8/7/19. Chronic, flare/not at goal.   Reviewed further treatment with oral terbinafine, reviewed minimal liver risks. Reviewed side effects including GI upset and taste disturbance. Did review medication list for possible interactions.   - Plan to start terbinafine 250 mg daily x 3 months  - Baseline labs ordered: hepatic panel, CBC with diff     # Slightly hyperkeratotic tender papule on the left plantar foot, DDx early clavus vs less likely foreign body. Not particularly bothersome at this time, consider biopsy on follow up if not improved.   - Recommended corn pad with salicylic acid    Procedures Performed:   None.    Follow-up: 6 month(s) in-person, or earlier for new or changing lesions    Staff and Scribe:     Scribe Disclosure:   I, Jonathon Au, am serving as a scribe to document services personally performed by this physician, Dr. Desmond Mendoza, based on data collection and the provider's statements to me.     Provider Disclosure:   The documentation recorded by the scribe accurately reflects the services I personally performed and the decisions made by me.    Desmond Mendoza MD    Department of Dermatology  [facility]  [facility]: Phone: [phone], Fax:[phone]  [facility]: Phone: [phone] Fax: [phone]  ____________________________________________    CC: RECHECK (Onychomycosis - did not start terbinafine)    HPI:  Mr. Natalie Ny is a(n) 52 year old male who presents today as a return patient for a spot check.    Last seen 7/29/20 by Nidia Agarwal PA-C for nail fungus. At that time, patient was instructed to start terbinafine 250 mg by mouth every day for 12 weeks.    Today, he notes that he has not started terbinafine from his previous visit with Nidia Agarwal. He notes that the nail fungus had not been improving, so he has been trying topical terbinafine, which has helped some.   He also notes a potential foreign body on the bottom of the left foot with some tenderness to pressure. It first occurred a month or two ago and interferes with the way he steps.    The patient otherwise denies any new or concerning lesions. No bleeding, painful, pruritic, or changing lesions. Health otherwise stable. No other skin concerns.       Labs Reviewed:  N/A    Physical Exam:  Vitals: There were no vitals taken for this visit.  SKIN: Focused examination of left foot was performed.  - Left great toe with yellow discoloration, nail thickening, and subungual debris.   - Yellow discoloration and nail thickening of bilateral toenails 10/10.   - Slightly hyperkeratotic tender papule on the left plantar foot.   - No other lesions of concern on areas examined.     Medications:  Current Outpatient Medications   Medication     albuterol (VENTOLIN HFA) 108 (90 Base) MCG/ACT inhaler     Continuous Blood Gluc  (FREESTYLE JAMES 2 READER) VENESSA     Continuous Blood Gluc Sensor (FREESTYLE JAMES 2 SENSOR) MISC     diltiazem 2% in PLO gel     finasteride (PROPECIA) 1 MG tablet     fluticasone (FLOVENT HFA) 110 MCG/ACT inhaler     levothyroxine (SYNTHROID/LEVOTHROID) 200 MCG  tablet     montelukast (SINGULAIR) 10 MG tablet     nadolol (CORGARD) 20 MG tablet     ranitidine (ZANTAC) 150 MG tablet     No current facility-administered medications for this visit.      Past Medical History:   Patient Active Problem List   Diagnosis     Grave's disease     Syncope and collapse     No past medical history on file.         Again, thank you for allowing me to participate in the care of your patient.        Sincerely,        Desmond Mendoza MD

## 2022-11-15 DIAGNOSIS — B35.1 ONYCHOMYCOSIS: Primary | ICD-10-CM

## 2022-11-15 RX ORDER — BLOOD-GLUCOSE SENSOR
1 EACH MISCELLANEOUS
Qty: 2 EACH | Refills: 5 | Status: SHIPPED | OUTPATIENT
Start: 2022-11-15 | End: 2022-11-23

## 2022-11-15 RX ORDER — TERBINAFINE HYDROCHLORIDE 250 MG/1
250 TABLET ORAL DAILY
Qty: 90 TABLET | Refills: 0 | Status: SHIPPED | OUTPATIENT
Start: 2022-11-15 | End: 2022-11-15

## 2022-11-15 RX ORDER — TERBINAFINE HYDROCHLORIDE 250 MG/1
250 TABLET ORAL DAILY
Qty: 90 TABLET | Refills: 0 | Status: SHIPPED | OUTPATIENT
Start: 2022-11-15 | End: 2023-03-14

## 2022-11-18 ENCOUNTER — VIRTUAL VISIT (OUTPATIENT)
Dept: ENDOCRINOLOGY | Facility: CLINIC | Age: 52
End: 2022-11-18
Payer: COMMERCIAL

## 2022-11-18 ENCOUNTER — TELEPHONE (OUTPATIENT)
Dept: ENDOCRINOLOGY | Facility: CLINIC | Age: 52
End: 2022-11-18

## 2022-11-18 DIAGNOSIS — R73.03 PRE-DIABETES: Primary | ICD-10-CM

## 2022-11-18 DIAGNOSIS — E03.4 HYPOTHYROIDISM DUE TO ACQUIRED ATROPHY OF THYROID: ICD-10-CM

## 2022-11-18 PROCEDURE — 99213 OFFICE O/P EST LOW 20 MIN: CPT | Mod: 95 | Performed by: INTERNAL MEDICINE

## 2022-11-18 NOTE — PROGRESS NOTES
Natalie is a 52 year old who is being evaluated via a billable telephone visit.      What phone number would you like to be contacted at? [phone]  How would you like to obtain your AVS? 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  He is status post radioactive iodine ablation in 2007, and has been on levothyroxine replacement ever since.  He is currently taking 200 mcg each day.  This is down from a higher dose he was taking before last May.  At that time he saw his TSH was suppressed and we reduce the dose to the current level.  He noted no change in his weight, palpitations, shortness of breath, or temperature tolerance.  Overall he feels well.  He has no eye signs.      He was noted to have prediabetes based on A1c in May.  He changed his diet and lost some weight that is now stabilized.  He continues to use the continuous glucose monitor that he finds very helpful.  We have no data to review today.    He was seen by cardiology to review his cardiovascular risks and they decided to manage them all through lifestyle management.  He is not interested in taking a statin at this time.          Current Outpatient Medications   Medication     albuterol (VENTOLIN HFA) 108 (90 Base) MCG/ACT inhaler     Continuous Blood Gluc  (FREESTYLE JAMES 2 READER) VENESSA     Continuous Blood Gluc Sensor (FREESTYLE JAMES 2 SENSOR) MISC     Continuous Blood Gluc Sensor (FREESTYLE JAMES 3 SENSOR) MISC     esomeprazole (NEXIUM) 20 MG DR capsule     finasteride (PROPECIA) 1 MG tablet     levothyroxine (SYNTHROID/LEVOTHROID) 200 MCG tablet     nadolol (CORGARD) 20 MG tablet     terbinafine (LAMISIL) 250 MG tablet     diltiazem 2% in PLO gel     fluticasone (FLOVENT HFA) 110 MCG/ACT inhaler     montelukast (SINGULAIR) 10 MG tablet     ranitidine (ZANTAC) 150 MG tablet     No current facility-administered medications for this visit.      98.1  F (36.7  C)  as of 11/29/2021     "  Pulse: -- -- 51  as of 7/18/2022    BP: -- -- 125/84  as of 7/18/2022    Resp: -- -- 16  as of 11/29/2021    SpO2: -- -- 99%  as of 7/18/2022    Body Mass Index:   None    Height: -- -- 1.798 m (5' 10.79\")   as of 7/18/2022    Weight: -- -- 85.8 kg (189 lb 3.2 oz)   as of 7/18/2022      Exam he is in no acute distress.    Recent Labs   Lab Test 07/25/22  0734 05/05/22  1456 08/26/21  1140   A1C  --  6.4* 5.8*   TSH  --  0.02* 0.03*   T4  --  1.42 1.42   *  140* 168* 131*   HDL 45 50 42   TRIG 138 74 197*   CR  --  0.95  --      ENDO THYROID LABS-[facility] Latest Ref Rng & Units 5/5/2022 8/26/2021   TSH 0.40 - 4.00 mU/L 0.02 (L) 0.03 (L)   T3 60 - 181 ng/dL     FREE T4 0.76 - 1.46 ng/dL 1.42 1.42       Assessment and plan:    1.hypothyroidism status post radioiodine ablation for Graves' disease.  He is on a stable dose.  I will check his thyroid function tests and adjust the dose as necessary.  If they are in target, I will plan to see him in 1 year.    2.prediabetes.  He has made some lifestyle changes.  I will check his A1c.  I reviewed the management of his risk factors and concur with the recommendation of cardiology for the time being.    I spent 14 minutes on the telephone visit with him (start time 7: 3 5 and end time 7: 5 0).  This visit was done from my office away from the clinic.  On the day of visit I spent an additional 10 minutes reviewing and interpreting his lab data, looking at his chart, and doing documentation.  "

## 2022-11-18 NOTE — PATIENT INSTRUCTIONS
Have your lab tests dpne.  Please contact us to schedule at any of our [place] lab locations  Call [phone]), select option [phone]

## 2022-11-18 NOTE — LETTER
11/18/2022       RE: Natalie Ny  [address]  [place] [address]     Dear Colleague,    Thank you for referring your patient, Natalie Ny, to the [facility] ENDOCRINOLOG[facility]IC [place] [facility]. Please see a copy of my visit note below.    Natalie is a 52 year old who is being evaluated via a billable telephone visit.      What phone number would you like to be contacted at? [phone]  How would you like to obtain your AVS? 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  He is status post radioactive iodine ablation in 2007, and has been on levothyroxine replacement ever since.  He is currently taking 200 mcg each day.  This is down from a higher dose he was taking before last May.  At that time he saw his TSH was suppressed and we reduce the dose to the current level.  He noted no change in his weight, palpitations, shortness of breath, or temperature tolerance.  Overall he feels well.  He has no eye signs.      He was noted to have prediabetes based on A1c in May.  He changed his diet and lost some weight that is now stabilized.  He continues to use the continuous glucose monitor that he finds very helpful.  We have no data to review today.    He was seen by cardiology to review his cardiovascular risks and they decided to manage them all through lifestyle management.  He is not interested in taking a statin at this time.          Current Outpatient Medications   Medication     albuterol (VENTOLIN HFA) 108 (90 Base) MCG/ACT inhaler     Continuous Blood Gluc  (FREESTYLE JAMES 2 READER) VENESSA     Continuous Blood Gluc Sensor (FREESTYLE JAMES 2 SENSOR) MISC     Continuous Blood Gluc Sensor (FREESTYLE JAMES 3 SENSOR) MISC     esomeprazole (NEXIUM) 20 MG DR capsule     finasteride (PROPECIA) 1 MG tablet     levothyroxine (SYNTHROID/LEVOTHROID) 200 MCG tablet     nadolol  "(CORGARD) 20 MG tablet     terbinafine (LAMISIL) 250 MG tablet     diltiazem 2% in PLO gel     fluticasone (FLOVENT HFA) 110 MCG/ACT inhaler     montelukast (SINGULAIR) 10 MG tablet     ranitidine (ZANTAC) 150 MG tablet     No current facility-administered medications for this visit.      98.1  F (36.7  C)  as of 11/29/2021      Pulse: -- -- 51  as of 7/18/2022    BP: -- -- 125/84  as of 7/18/2022    Resp: -- -- 16  as of 11/29/2021    SpO2: -- -- 99%  as of 7/18/2022    Body Mass Index:   None    Height: -- -- 1.798 m (5' 10.79\")   as of 7/18/2022    Weight: -- -- 85.8 kg (189 lb 3.2 oz)   as of 7/18/2022      Exam he is in no acute distress.    Recent Labs   Lab Test 07/25/22  0734 05/05/22  1456 08/26/21  1140   A1C  --  6.4* 5.8*   TSH  --  0.02* 0.03*   T4  --  1.42 1.42   *  140* 168* 131*   HDL 45 50 42   TRIG 138 74 197*   CR  --  0.95  --      ENDO THYROID LABS-[facility] Latest Ref Rng & Units 5/5/2022 8/26/2021   TSH 0.40 - 4.00 mU/L 0.02 (L) 0.03 (L)   T3 60 - 181 ng/dL     FREE T4 0.76 - 1.46 ng/dL 1.42 1.42       Assessment and plan:    1.hypothyroidism status post radioiodine ablation for Graves' disease.  He is on a stable dose.  I will check his thyroid function tests and adjust the dose as necessary.  If they are in target, I will plan to see him in 1 year.    2.prediabetes.  He has made some lifestyle changes.  I will check his A1c.  I reviewed the management of his risk factors and concur with the recommendation of cardiology for the time being.    I spent 14 minutes on the telephone visit with him (start time 7: 3 5 and end time 7: 5 0).  This visit was done from my office away from the clinic.  On the day of visit I spent an additional 10 minutes reviewing and interpreting his lab data, looking at his chart, and doing documentation.    Jane Calvo MD      "

## 2022-11-18 NOTE — NURSING NOTE
Pt states he is not taking diltiazem gel, flovent inhaler, singulair or zantac. Pt states lamisil is a new rx that he has not yet started. Lazara Acosta on 11/18/2022 at 7:17 AM

## 2022-11-23 RX ORDER — BLOOD-GLUCOSE SENSOR
1 EACH MISCELLANEOUS
Qty: 6 EACH | Refills: 3 | Status: SHIPPED | OUTPATIENT
Start: 2022-11-23 | End: 2024-01-15

## 2023-01-09 ENCOUNTER — MYC MEDICAL ADVICE (OUTPATIENT)
Dept: FAMILY MEDICINE | Facility: CLINIC | Age: 53
End: 2023-01-09

## 2023-01-09 ENCOUNTER — MYC MEDICAL ADVICE (OUTPATIENT)
Dept: PULMONOLOGY | Facility: CLINIC | Age: 53
End: 2023-01-09

## 2023-01-09 DIAGNOSIS — K21.9 GERD (GASTROESOPHAGEAL REFLUX DISEASE): Primary | ICD-10-CM

## 2023-01-09 DIAGNOSIS — K21.00 GASTROESOPHAGEAL REFLUX DISEASE WITH ESOPHAGITIS: ICD-10-CM

## 2023-01-14 ENCOUNTER — HEALTH MAINTENANCE LETTER (OUTPATIENT)
Age: 53
End: 2023-01-14

## 2023-01-27 ENCOUNTER — TELEPHONE (OUTPATIENT)
Dept: CARDIOLOGY | Facility: CLINIC | Age: 53
End: 2023-01-27
Payer: COMMERCIAL

## 2023-01-27 NOTE — TELEPHONE ENCOUNTER
Left Voicemail (1st Attempt) and Sent Mychart (1st Attempt) for the patient to call back and schedule the following:    Appointment type: Cardiology- Return EP  Provider: Isael  Return date: 05/23/23  Specialty phone number: [phone]  Additional appointment(s) needed: none  Additonal Notes: sunil Ferrell, Visit Facilitator/MA.

## 2023-03-14 ENCOUNTER — VIRTUAL VISIT (OUTPATIENT)
Dept: PULMONOLOGY | Facility: CLINIC | Age: 53
End: 2023-03-14
Attending: INTERNAL MEDICINE
Payer: COMMERCIAL

## 2023-03-14 DIAGNOSIS — R05.1 ACUTE COUGH: ICD-10-CM

## 2023-03-14 DIAGNOSIS — R91.8 PULMONARY NODULES: ICD-10-CM

## 2023-03-14 DIAGNOSIS — J45.20 MILD INTERMITTENT ASTHMA WITHOUT COMPLICATION: Primary | ICD-10-CM

## 2023-03-14 PROCEDURE — G0463 HOSPITAL OUTPT CLINIC VISIT: HCPCS | Mod: PN,GT | Performed by: INTERNAL MEDICINE

## 2023-03-14 PROCEDURE — 99214 OFFICE O/P EST MOD 30 MIN: CPT | Mod: VID | Performed by: INTERNAL MEDICINE

## 2023-03-14 NOTE — LETTER
3/14/2023       RE: Natalie Ny  [address] [address]     Dear Colleague,    Thank you for referring your patient, Natalie Ny, to the [facility] at [facility]. Please see a copy of my visit note below.    Video-Visit Details    Type of service:  Video Visit    Video Start Time (time video started): 1615    Video End Time (time video stopped): 1623    Originating Location (pt. Location): Home        Distant Location (provider location):  On-site    Mode of Communication:  Video Conference via [facility]    LUNG NODULE & INTERVENTIONAL PULMONARY CLINIC  CLINICS & SURGERY [place], [facility]     Natalie Ny MRN# [id number]   Age: 51 year old YOB: 1970       Requesting Physician: Jed Knox MD  [address] [place] [id number]       Assessment and Plan:    1.  52-year-old man with mild intermittent asthma.  He responds to treatment and in between episodes he has no other issues.  We will plan for as needed Flovent and prednisone and avoid triggers if possible.  During his episodes he does have some cough which seems to be related.      He will follow up with me as needed.           History:     Natalie Ny is a 52 year old male with sig for history of post viral cough who is here for evaluation/followup.  He had an episode of cough and asthma type exacerbation a few months ago.  He started inhaled steroids and ended up feeling better.  Trigger may have been getting sick from his kids.  He is now feeling quite a bit better and back to normal.  No other changes.                 Past Medical History:   Reviewed.  Nothing significant.       Past Surgical History:    No past surgical history on file.       Social History:     Social History     Tobacco Use     Smoking status: Never     Smokeless tobacco: Never   Substance Use Topics      Alcohol use: Not on file          Family History:   No family history on file.        Allergies:      Allergies   Allergen Reactions     Amoxicillin Rash     Penicillins Rash          Medications:     Current Outpatient Medications   Medication Sig     albuterol (VENTOLIN HFA) 108 (90 Base) MCG/ACT inhaler Inhale 2 puffs into the lungs every 6 hours as needed for shortness of breath / dyspnea or wheezing     Continuous Blood Gluc  (FREESTYLE JAMES 2 READER) VENESSA 1 each See Admin Instructions Use per 's instructions for continuous glucose monitoring.     Continuous Blood Gluc Sensor (FREESTYLE JAMES 2 SENSOR) MISC 1 each See Admin Instructions Change every 14 days.     Continuous Blood Gluc Sensor (FREESTYLE JAMES 3 SENSOR) MISC 1 each every 14 days     esomeprazole (NEXIUM) 20 MG DR capsule Take 20 mg by mouth daily     finasteride (PROPECIA) 1 MG tablet Take 1 tablet (1 mg) by mouth daily     fluticasone (FLOVENT HFA) 110 MCG/ACT inhaler Inhale 1-2 puffs into the lungs 2 times daily     levothyroxine (SYNTHROID/LEVOTHROID) 200 MCG tablet Take 1tablet daily on 6 days of week and 2 tablets on the 7th day of the week     nadolol (CORGARD) 20 MG tablet Take 1 tablet (20 mg) by mouth daily     diltiazem 2% in PLO gel Apply small amount to anal opening three times daily for 8 weeks. (Patient not taking: Reported on 11/18/2022)     montelukast (SINGULAIR) 10 MG tablet Take 1 tablet (10 mg) by mouth At Bedtime (Patient not taking: Reported on 7/18/2022)     ranitidine (ZANTAC) 150 MG tablet Take 150 mg by mouth as needed. (Patient not taking: Reported on 11/18/2022)     terbinafine (LAMISIL) 250 MG tablet Take 1 tablet (250 mg) by mouth daily (Patient not taking: Reported on 3/14/2023)     No current facility-administered medications for this visit.          Review of Systems:     See HPI         Physical Exam:   There were no vitals taken for this visit.    Constitutional - looks well, in no  apparent distress  Eyes - no redness or discharge  Respiratory -breathing appears comfortable. No wheeze or rhonchi.   Cardiac -- Normal rate, rhythm.   Skin - No appreciable discoloration or lesions (very limited exam)  Neurological - No apparent tremors. Speech fluent and articlate  Psychiatric - no signs of delirium or anxiety          Current Laboratory Data:   All laboratory and imaging data reviewed.    No results found for this or any previous visit (from the past 24 hour(s)).           Again, thank you for allowing me to participate in the care of your patient.      Sincerely,    Jed Nolasco MD

## 2023-03-14 NOTE — NURSING NOTE
Is the patient currently in the state of [place]? YES    Visit mode:VIDEO    If the visit is dropped, the patient can be reconnected by: VIDEO VISIT: Text to cell phone: [phone]    Will anyone else be joining the visit? NO      How would you like to obtain your AVS? MyChart    Are changes needed to the allergy or medication list? NO   Patient declined distress screening.     [place], [place] Facilitator    Reason for visit: Video Visit Return

## 2023-03-14 NOTE — PROGRESS NOTES
Video-Visit Details    Type of service:  Video Visit    Video Start Time (time video started): 1615    Video End Time (time video stopped): 1623    Originating Location (pt. Location): Home        Distant Location (provider location):  On-site    Mode of Communication:  Video Conference via [facility]    LUNG NODULE & INTERVENTIONAL PULMONARY CLINIC  CLINICS & SURGERY CENTER, [facility]     Natalie Ny MRN# [id number]   Age: 51 year old YOB: 1970       Requesting Physician: Jed Knox MD  [address] [place] [id number]       Assessment and Plan:    1.  52-year-old man with mild intermittent asthma.  He responds to treatment and in between episodes he has no other issues.  We will plan for as needed Flovent and prednisone and avoid triggers if possible.  During his episodes he does have some cough which seems to be related.      He will follow up with me as needed.           History:     Natalie Ny is a 52 year old male with sig for history of post viral cough who is here for evaluation/followup.  He had an episode of cough and asthma type exacerbation a few months ago.  He started inhaled steroids and ended up feeling better.  Trigger may have been getting sick from his kids.  He is now feeling quite a bit better and back to normal.  No other changes.                 Past Medical History:   Reviewed.  Nothing significant.       Past Surgical History:    No past surgical history on file.       Social History:     Social History     Tobacco Use     Smoking status: Never     Smokeless tobacco: Never   Substance Use Topics     Alcohol use: Not on file          Family History:   No family history on file.        Allergies:      Allergies   Allergen Reactions     Amoxicillin Rash     Penicillins Rash          Medications:     Current Outpatient Medications   Medication Sig     albuterol (VENTOLIN HFA) 108 (90 Base) MCG/ACT inhaler  Inhale 2 puffs into the lungs every 6 hours as needed for shortness of breath / dyspnea or wheezing     Continuous Blood Gluc  (FREESTYLE JAMES 2 READER) VENESSA 1 each See Admin Instructions Use per 's instructions for continuous glucose monitoring.     Continuous Blood Gluc Sensor (FREESTYLE JAMES 2 SENSOR) MISC 1 each See Admin Instructions Change every 14 days.     Continuous Blood Gluc Sensor (FREESTYLE JAMES 3 SENSOR) MISC 1 each every 14 days     esomeprazole (NEXIUM) 20 MG DR capsule Take 20 mg by mouth daily     finasteride (PROPECIA) 1 MG tablet Take 1 tablet (1 mg) by mouth daily     fluticasone (FLOVENT HFA) 110 MCG/ACT inhaler Inhale 1-2 puffs into the lungs 2 times daily     levothyroxine (SYNTHROID/LEVOTHROID) 200 MCG tablet Take 1tablet daily on 6 days of week and 2 tablets on the 7th day of the week     nadolol (CORGARD) 20 MG tablet Take 1 tablet (20 mg) by mouth daily     diltiazem 2% in PLO gel Apply small amount to anal opening three times daily for 8 weeks. (Patient not taking: Reported on 11/18/2022)     montelukast (SINGULAIR) 10 MG tablet Take 1 tablet (10 mg) by mouth At Bedtime (Patient not taking: Reported on 7/18/2022)     ranitidine (ZANTAC) 150 MG tablet Take 150 mg by mouth as needed. (Patient not taking: Reported on 11/18/2022)     terbinafine (LAMISIL) 250 MG tablet Take 1 tablet (250 mg) by mouth daily (Patient not taking: Reported on 3/14/2023)     No current facility-administered medications for this visit.          Review of Systems:     See HPI         Physical Exam:   There were no vitals taken for this visit.    Constitutional - looks well, in no apparent distress  Eyes - no redness or discharge  Respiratory -breathing appears comfortable. No wheeze or rhonchi.   Cardiac -- Normal rate, rhythm.   Skin - No appreciable discoloration or lesions (very limited exam)  Neurological - No apparent tremors. Speech fluent and articlate  Psychiatric - no signs of  delirium or anxiety          Current Laboratory Data:   All laboratory and imaging data reviewed.    No results found for this or any previous visit (from the past 24 hour(s)).

## 2023-03-16 DIAGNOSIS — R05.9 COUGH: ICD-10-CM

## 2023-03-16 RX ORDER — PREDNISONE 20 MG/1
20 TABLET ORAL DAILY
Qty: 7 TABLET | Refills: 0 | Status: SHIPPED | OUTPATIENT
Start: 2023-03-16 | End: 2024-06-13

## 2023-03-16 RX ORDER — FLUTICASONE PROPIONATE 110 UG/1
1-2 AEROSOL, METERED RESPIRATORY (INHALATION) 2 TIMES DAILY
Qty: 12 G | Refills: 11 | Status: SHIPPED | OUTPATIENT
Start: 2023-03-16 | End: 2024-01-29 | Stop reason: DRUGHIGH

## 2023-03-17 RX ORDER — ALBUTEROL SULFATE 90 UG/1
AEROSOL, METERED RESPIRATORY (INHALATION)
Qty: 18 G | Refills: 0 | Status: SHIPPED | OUTPATIENT
Start: 2023-03-17 | End: 2023-04-14

## 2023-03-17 NOTE — TELEPHONE ENCOUNTER
Ventolin inhaler      Last Written Prescription Date:  12/20/21  Last Fill Quantity: 18,   # refills: 10  Last Office Visit : 11/26/21  Future Office visit:  03/31/23    Protocol failed:  Recent (12 mo) or future (30 days) visit within the authorizing provider's specialty    Apt scheduled within 30d - short term refill approved

## 2023-04-14 ENCOUNTER — OFFICE VISIT (OUTPATIENT)
Dept: FAMILY MEDICINE | Facility: CLINIC | Age: 53
End: 2023-04-14
Payer: COMMERCIAL

## 2023-04-14 VITALS
WEIGHT: 187.6 LBS | DIASTOLIC BLOOD PRESSURE: 80 MMHG | HEART RATE: 60 BPM | OXYGEN SATURATION: 99 % | BODY MASS INDEX: 26.32 KG/M2 | SYSTOLIC BLOOD PRESSURE: 117 MMHG

## 2023-04-14 DIAGNOSIS — L65.9 HAIR LOSS: ICD-10-CM

## 2023-04-14 DIAGNOSIS — E78.00 HIGH BLOOD CHOLESTEROL: ICD-10-CM

## 2023-04-14 DIAGNOSIS — E03.9 ACQUIRED HYPOTHYROIDISM: ICD-10-CM

## 2023-04-14 DIAGNOSIS — R05.9 COUGH, UNSPECIFIED TYPE: ICD-10-CM

## 2023-04-14 DIAGNOSIS — R55 SYNCOPE, UNSPECIFIED SYNCOPE TYPE: ICD-10-CM

## 2023-04-14 DIAGNOSIS — R73.03 PREDIABETES: Primary | ICD-10-CM

## 2023-04-14 DIAGNOSIS — K62.5 RECTAL BLEEDING: ICD-10-CM

## 2023-04-14 PROCEDURE — 99396 PREV VISIT EST AGE 40-64: CPT | Performed by: FAMILY MEDICINE

## 2023-04-14 RX ORDER — FINASTERIDE 1 MG/1
1 TABLET, FILM COATED ORAL DAILY
Qty: 90 TABLET | Refills: 3 | Status: SHIPPED | OUTPATIENT
Start: 2023-04-14 | End: 2024-06-13

## 2023-04-14 RX ORDER — ALBUTEROL SULFATE 90 UG/1
2 AEROSOL, METERED RESPIRATORY (INHALATION) EVERY 6 HOURS PRN
Qty: 18 G | Refills: 3 | Status: SHIPPED | OUTPATIENT
Start: 2023-04-14 | End: 2024-06-13

## 2023-04-14 NOTE — NURSING NOTE
Natalie Ny is a 52 year old male that presents in clinic today for the following:     Chief Complaint   Patient presents with     Physical     Pt here for annual physical and would like refill on propecia       The patient's allergies and medications were reviewed. The patient's vitals were obtained without incident. The patient does not have any other questions for the provider.     Yael Sapp, EMT at 3:30 PM on 4/14/2023.  Primary Care Clinic: [phone]

## 2023-04-14 NOTE — PROGRESS NOTES
"  Assessment & Plan     Hair loss  Continue  - finasteride (PROPECIA) 1 MG tablet; Take 1 tablet (1 mg) by mouth daily    Acquired hypothyroidism  Same  - finasteride (PROPECIA) 1 MG tablet; Take 1 tablet (1 mg) by mouth daily  - TSH with free T4 reflex; Future    High blood cholesterol  - finasteride (PROPECIA) 1 MG tablet; Take 1 tablet (1 mg) by mouth daily  - Lipid panel reflex to direct LDL Fasting; Future  - Comprehensive metabolic panel (BMP + Alb, Alk Phos, ALT, AST, Total. Bili, TP); Future    Syncope, unspecified syncope type  Cont beta, sees EP    Cough  Uses prn  - albuterol (VENTOLIN HFA) 108 (90 Base) MCG/ACT inhaler; Inhale 2 puffs into the lungs every 6 hours as needed for shortness of breath    Prediabetes  Due  - Hemoglobin A1c; Future    GERD: cont ppi, prn aa, egd rvwd    Rectal bleeding  Pt prefers   - Adult Colorectal Surgery  Referral; Future    Review of external notes as documented elsewhere in note  42 minutes spent by me on the date of the encounter doing chart review, history and exam, documentation and further activities per the note    BMI:   Estimated body mass index is 26.32 kg/m  as calculated from the following:    Height as of 7/18/22: 1.798 m (5' 10.79\").    Weight as of this encounter: 85.1 kg (187 lb 9.6 oz).     No follow-ups on file.    Jed Knox MD  [facility] [place]    Dalton Estrada is a 52 year old, presenting for the following health issues:  Physical (Pt here for annual physical and would like refill on propecia)         View : No data to display.              HPI   Here for check up  Propecia helps hair, tolerated  GERD: on daily PPI, occasional break thru otc, still some cough, pulm notes rvwd  Beta works well to control OR tremor  Due for tsh/hypothyroid, a1c prediabetes, I'll fwd to Endo provider  Due for lipids, doing lifestyle, cardio notes rvwd  Colonoscopy utd in care ev, some blood on toilet paper at times, " pt prefer cr visit  Using topicals for toenail fungus, slowly better, knows could try orals, has    Has not tried long term use flovent to see if that' suppress cough    PMH   Graves/hypothyroid  Prediabates  Benign pulm nodules  Asthma  Remote syncope  No PSH  FH: mom  at 47 autoimmune hepatits  Dad at 69 PD  Colon due 2019  Shots: I suggest dtap, shingrix at [facility]    Current Outpatient Medications   Medication     albuterol (VENTOLIN HFA) 108 (90 Base) MCG/ACT inhaler     Continuous Blood Gluc  (FREESTYLE JAMES 2 READER) VENESSA     Continuous Blood Gluc Sensor (FREESTYLE JAMES 2 SENSOR) MISC     Continuous Blood Gluc Sensor (FREESTYLE JAMES 3 SENSOR) MISC     esomeprazole (NEXIUM) 20 MG DR capsule     finasteride (PROPECIA) 1 MG tablet     fluticasone (FLOVENT HFA) 110 MCG/ACT inhaler     levothyroxine (SYNTHROID/LEVOTHROID) 200 MCG tablet     nadolol (CORGARD) 20 MG tablet     predniSONE (DELTASONE) 20 MG tablet     No current facility-administered medications for this visit.     Allergies   Allergen Reactions     Amoxicillin Rash     Penicillins Rash     , no smoke/etoh, U MD, exercises, eat healthy, 3 small kids                  Review of Systems   Answers for HPI/ROS submitted by the patient on 2023  General Symptoms: No  Skin Symptoms: No  HENT Symptoms: No  EYE SYMPTOMS: No  HEART SYMPTOMS: No  LUNG SYMPTOMS: No  INTESTINAL SYMPTOMS: No  URINARY SYMPTOMS: No  REPRODUCTIVE SYMPTOMS: No  SKELETAL SYMPTOMS: No  BLOOD SYMPTOMS: No  NERVOUS SYSTEM SYMPTOMS: No  MENTAL HEALTH SYMPTOMS: No          Objective    /80 (BP Location: Right arm, Patient Position: Sitting, Cuff Size: Adult Regular)   Pulse 60   Wt 85.1 kg (187 lb 9.6 oz)   SpO2 99%   BMI 26.32 kg/m    Body mass index is 26.32 kg/m .  Physical Exam   GENERAL: healthy, alert and no distress  EYES: Eyes grossly normal to inspection, PERRL and conjunctivae and sclerae normal  HENT: ear canals and TM's normal, nose and mouth  without ulcers or lesions  NECK: no adenopathy, no asymmetry, masses, or scars and thyroid normal to palpation  RESP: lungs clear to auscultation - no rales, rhonchi or wheezes  CV: regular rate and rhythm, normal S1 S2, no S3 or S4, no murmur, click or rub, no peripheral edema and peripheral pulses strong  ABDOMEN: soft, nontender, no hepatosplenomegaly, no masses and bowel sounds normal   (male): normal male genitalia without lesions or urethral discharge, no hernia  MS: no gross musculoskeletal defects noted, no edema  SKIN: no suspicious lesions or rashes  NEURO: Normal strength and tone, mentation intact and speech normal  PSYCH: mentation appears normal, affect normal/bright

## 2023-09-01 ENCOUNTER — NURSE TRIAGE (OUTPATIENT)
Dept: NURSING | Facility: CLINIC | Age: 53
End: 2023-09-01
Payer: COMMERCIAL

## 2023-09-01 ENCOUNTER — MYC MEDICAL ADVICE (OUTPATIENT)
Dept: FAMILY MEDICINE | Facility: CLINIC | Age: 53
End: 2023-09-01
Payer: COMMERCIAL

## 2023-09-01 NOTE — TELEPHONE ENCOUNTER
COVID Positive/Requesting COVID treatment    Patient is positive for COVID and requesting treatment options.  Patient over age 50; eligible for Paxlovid.  Date of positive COVID test (PCR or at home)? 9/1/2023  Current COVID symptoms: fatigue, sore throat, and congestion or runny nose  Date COVID symptoms began: 9/1/2023 (started with mild sore throat today)    Message should be routed to clinic RN pool. Best phone number to use for call back: [phone]    Add'l Triage Notes:  Currently on day zero of covid illness.  Current temp 98.5 (temporal scanner).  No chills.  O2 sat 98%.    Discussed self-care measures and household isolation precautions.    Ryann SHIPMAN [facility] Nurse Advisor     Reason for Disposition   [1] SEVERE RISK patient (e.g., immuno-compromised, serious lung disease, on oxygen, heart disease, bedridden, etc) AND [2] suspected COVID-19 with mild symptoms (Exception: Already seen by PCP and no new or worsening symptoms.)    Additional Information   Negative: Severe difficulty breathing (struggling for each breath, unable to speak or cry, making grunting noises with each breath, severe retractions) (Triage tip: Listen to the child's breathing.)   Negative: Slow, shallow, weak breathing   Negative: [1] Bluish (or gray) lips or face now AND [2] persists when not coughing   Negative: Difficult to awaken or not alert when awake (confusion)   Negative: Very weak (doesn't move or make eye contact)   Negative: Sounds like a life-threatening emergency to the triager   Negative: [1] Had lab test confirmed COVID-19 infection within last 3 months AND [2] new-onset of COVID-19 possible symptoms AND [3] no NEW variant strains in community   Negative: [1] Stridor (harsh, raspy sound heard with breathing in) AND [2] confirmed by triager   Negative: Runny nose from nasal allergies   Negative: [1] Headache is isolated symptom (no fever) AND [2] no known COVID-19 close contact   Negative: [1] Vomiting is isolated  symptom (no fever) AND [2] no known COVID-19 close contact   Negative: [1] Diarrhea is isolated symptom (no fever) AND [2] no known COVID-19 close contact   Negative: [1] COVID-19 exposure AND [2] NO symptoms   Negative: [1] COVID-19 vaccine general reaction (fever, headache, muscle aches, fatigue) AND [2] starts within 48 hours of shot (Note: vaccine does not cause respiratory symptoms. Stay here for those symptoms.)   Negative: COVID-19 vaccine, questions about   Negative: [1] Diagnosed with influenza within the last 2 weeks by a HCP AND [2] follow-up call   Negative: [1] Household exposure to known influenza (flu test positive) AND [2] child with influenza-like symptoms   Negative: [1] Difficulty breathing confirmed by triager BUT [2] not severe (Triage tip: Listen to the child's breathing.)   Negative: Ribs are pulling in with each breath (retractions)   Negative: [1] Age < 12 weeks AND [2] fever 100.4 F (38.0 C) or higher rectally   Negative: SEVERE chest pain or pressure (excruciating)   Negative: [1] Oxygen level <92% (<90% if altitude > 5000 feet) AND [2] any trouble breathing   Negative: [1] Stridor (harsh sound with breathing in) AND [2] doesn't respond to 20 minutes of warm mist OR has occurred 2 or more times   Negative: Rapid breathing (Breaths/min > 60 if < 2 mo; > 50 if 2-12 mo; > 40 if 1-5 years; > 30 if 6-11 years; > 20 if > 12 years)   Negative: [1] MODERATE chest pain or pressure (by caller's report) AND [2] can't take a deep breath   Negative: [1] Fever AND [2] > 105 F (40.6 C) by any route OR axillary > 104 F (40 C)   Negative: [1] Shaking chills (shivering) AND [2] present constantly > 30 minutes   Negative: [1] Sore throat AND [2] complication suspected (refuses to drink, can't swallow fluids, new-onset drooling, can't move neck normally or other serious symptom)   Negative: [1] Muscle or body pains AND [2] complication suspected (can't stand, can't walk, can barely walk, can't move arm or  hand normally or other serious symptom)   Negative: [1] Headache AND [2] complication suspected (stiff neck, incapacitated by pain, worst headache ever, confused, weakness or other serious symptom)   Negative: [1] Dehydration suspected AND [2] age < 1 year (signs: no urine > 8 hours AND very dry mouth, no  tears, ill-appearing, etc.)   Negative: [1] Dehydration suspected AND [2] age > 1 year (signs: no urine > 12 hours AND very dry mouth, no tears, ill-appearing, etc.)   Negative: Child sounds very sick or weak to the triager   Negative: [1] Wheezing confirmed by triager AND [2] no trouble breathing (Exception: known asthmatic)   Negative: [1] Lips or face have turned bluish BUT [2] only during coughing fits   Negative: [1] Age < 3 months AND [2] lots of coughing   Negative: [1] Crying continuously AND [2] cannot be comforted AND [3] present > 2 hours   Negative: [1] Oxygen level <92% (90% if altitude > 5000 feet) AND [2] no trouble breathing    Protocols used: Coronavirus (COVID-19) Diagnosed or [id number]

## 2023-10-23 ENCOUNTER — MYC MEDICAL ADVICE (OUTPATIENT)
Dept: ENDOCRINOLOGY | Facility: CLINIC | Age: 53
End: 2023-10-23
Payer: COMMERCIAL

## 2023-10-23 DIAGNOSIS — E03.4 HYPOTHYROIDISM DUE TO ACQUIRED ATROPHY OF THYROID: ICD-10-CM

## 2023-10-24 RX ORDER — LEVOTHYROXINE SODIUM 200 UG/1
TABLET ORAL
Qty: 96 TABLET | Refills: 0 | Status: SHIPPED | OUTPATIENT
Start: 2023-10-24 | End: 2024-01-15

## 2023-10-24 NOTE — TELEPHONE ENCOUNTER
levothyroxine (SYNTHROID/LEVOTHROID) 200 MCG tablet 96 tablet 3 7/7/2022     Last Office Visit : 11-  Future Office visit:  11-6-2023    TSH OVERDUE      Kathleen M Doege RN

## 2023-11-06 ENCOUNTER — VIRTUAL VISIT (OUTPATIENT)
Dept: ENDOCRINOLOGY | Facility: CLINIC | Age: 53
End: 2023-11-06
Payer: COMMERCIAL

## 2023-11-06 DIAGNOSIS — E03.4 HYPOTHYROIDISM DUE TO ACQUIRED ATROPHY OF THYROID: Primary | ICD-10-CM

## 2023-11-06 DIAGNOSIS — R73.03 PRE-DIABETES: ICD-10-CM

## 2023-11-06 PROCEDURE — 99213 OFFICE O/P EST LOW 20 MIN: CPT | Mod: VID | Performed by: INTERNAL MEDICINE

## 2023-11-06 ASSESSMENT — PAIN SCALES - GENERAL: PAINLEVEL: NO PAIN (0)

## 2023-11-06 NOTE — PROGRESS NOTES
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  He is status post radioactive iodine ablation in 2007, and has been on levothyroxine replacement ever since.  He is currently taking 200 mcg each day.        He noted no change in his weight, palpitations, shortness of breath, or temperature tolerance.  Overall he feels well.  He has no eye signs.      He was noted to have prediabetes based on A1c last year.  He changed his diet and lost some weight that is now stabilized.  He continues to use the continuous glucose monitor that he finds very helpful.  We have no data to review today but he thinks his calculated A1c is in the 6s.    He was seen by cardiology to review his cardiovascular risks and they decided to manage them all through lifestyle management.  He is not interested in taking a statin at this time.    Current Outpatient Medications   Medication    albuterol (VENTOLIN HFA) 108 (90 Base) MCG/ACT inhaler    Continuous Blood Gluc  (FREESTYLE JAMES 2 READER) VENESSA    Continuous Blood Gluc Sensor (FREESTYLE JAMES 2 SENSOR) MISC    Continuous Blood Gluc Sensor (FREESTYLE JAMES 3 SENSOR) MISC    esomeprazole (NEXIUM) 20 MG DR capsule    finasteride (PROPECIA) 1 MG tablet    fluticasone (FLOVENT HFA) 110 MCG/ACT inhaler    levothyroxine (SYNTHROID/LEVOTHROID) 200 MCG tablet    nadolol (CORGARD) 20 MG tablet    predniSONE (DELTASONE) 20 MG tablet     No current facility-administered medications for this visit.                14/2023  1529 Most Recent Value      Temp: -- 98.1  F (36.7  C)  as of 11/29/2021     Pulse: 60 60  as of 4/14/2023     BP: 117/80 117/80  as of 4/14/2023     Resp: -- 16  as of 11/29/2021     SpO2: 99 % 99%  as of 4/14/2023     Body Mass Index:  None     Systolic (Patient Repo...: -- Not taken     Diastolic (Patient Rep...: -- Not taken     Height: -- 1.798 m (5' 10.79\")   as of 7/18/2022     Weight: 85.1 kg (187 lb 9.6 oz) 85.1 kg (187 lb 9.6 oz)  as of 4/14/2023     On " exam he is in no acute distress.  He is without periorbital edema.  Mood is upbeat    Recent Labs   Lab Test 07/25/22  0734 05/05/22  1456 08/26/21  1140   A1C  --  6.4* 5.8*   TSH  --  0.02* 0.03*   T4  --  1.42 1.42   *  140* 168* 131*   HDL 45 50 42   TRIG 138 74 197*   CR  --  0.95  --    Assessment and plan:    1.radioiodine induced hypothyroidism and Graves' disease.  He is on thyroid hormone replacement.  Clinically he appears to be euthyroid.  I will check his TSH.    2.prediabetes.  I will check his A1c.    3.CVD risk.  His blood pressure is fine.  I will repeat his lipid panels.    If everything is in target, I will plan to see him again in 1 year.    I spent 10 minutes on the video visit with the patient (start time 4: 00 and end time 4: 10).  On the same day of visit I spent an additional 5 minutes ordering tests and doing documentation.      Jane Calvo MD

## 2023-11-06 NOTE — NURSING NOTE
Is the patient currently in the state of [place]? YES    Visit mode:VIDEO    If the visit is dropped, the patient can be reconnected by: VIDEO VISIT: Text to cell phone:   Telephone Information:   Mobile [phone]       Will anyone else be joining the visit? NO  (If patient encounters technical issues they should call [phone]    How would you like to obtain your AVS? MyChart    Are changes needed to the allergy or medication list? No    Reason for visit: RECHTALI SAN

## 2023-11-06 NOTE — LETTER
11/6/2023       RE: Natalie yN  [address]  [place] [address]     Dear Colleague,    Thank you for referring your patient, Natalie Ny, to the [facility] [place] [facility]. Please see a copy of my visit note below.    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  He is status post radioactive iodine ablation in 2007, and has been on levothyroxine replacement ever since.  He is currently taking 200 mcg each day.        He noted no change in his weight, palpitations, shortness of breath, or temperature tolerance.  Overall he feels well.  He has no eye signs.      He was noted to have prediabetes based on A1c last year.  He changed his diet and lost some weight that is now stabilized.  He continues to use the continuous glucose monitor that he finds very helpful.  We have no data to review today but he thinks his calculated A1c is in the 6s.    He was seen by cardiology to review his cardiovascular risks and they decided to manage them all through lifestyle management.  He is not interested in taking a statin at this time.    Current Outpatient Medications   Medication    albuterol (VENTOLIN HFA) 108 (90 Base) MCG/ACT inhaler    Continuous Blood Gluc  (FREESTYLE JAMES 2 READER) VENESSA    Continuous Blood Gluc Sensor (FREESTYLE JAMES 2 SENSOR) MISC    Continuous Blood Gluc Sensor (FREESTYLE JAMES 3 SENSOR) MISC    esomeprazole (NEXIUM) 20 MG DR capsule    finasteride (PROPECIA) 1 MG tablet    fluticasone (FLOVENT HFA) 110 MCG/ACT inhaler    levothyroxine (SYNTHROID/LEVOTHROID) 200 MCG tablet    nadolol (CORGARD) 20 MG tablet    predniSONE (DELTASONE) 20 MG tablet     No current facility-administered medications for this visit.                14/2023  1529 Most Recent Value      Temp: -- 98.1  F (36.7  C)  as of 11/29/2021     Pulse: 60 60  as of 4/14/2023     BP: 117/80 117/80  as of 4/14/2023    "  Resp: -- 16  as of 11/29/2021     SpO2: 99 % 99%  as of 4/14/2023     Body Mass Index:  None     Systolic (Patient Repo...: -- Not taken     Diastolic (Patient Rep...: -- Not taken     Height: -- 1.798 m (5' 10.79\")   as of 7/18/2022     Weight: 85.1 kg (187 lb 9.6 oz) 85.1 kg (187 lb 9.6 oz)  as of 4/14/2023     On exam he is in no acute distress.  He is without periorbital edema.  Mood is upbeat    Recent Labs   Lab Test 07/25/22  0734 05/05/22  1456 08/26/21  1140   A1C  --  6.4* 5.8*   TSH  --  0.02* 0.03*   T4  --  1.42 1.42   *  140* 168* 131*   HDL 45 50 42   TRIG 138 74 197*   CR  --  0.95  --    Assessment and plan:    1.radioiodine induced hypothyroidism and Graves' disease.  He is on thyroid hormone replacement.  Clinically he appears to be euthyroid.  I will check his TSH.    2.prediabetes.  I will check his A1c.    3.CVD risk.  His blood pressure is fine.  I will repeat his lipid panels.    If everything is in target, I will plan to see him again in 1 year.    I spent 10 minutes on the video visit with the patient (start time 4: 00 and end time 4: 10).  On the same day of visit I spent an additional 5 minutes ordering tests and doing documentation.      Jane Calvo MD    "

## 2023-11-10 ENCOUNTER — TELEPHONE (OUTPATIENT)
Dept: ENDOCRINOLOGY | Facility: CLINIC | Age: 53
End: 2023-11-10
Payer: COMMERCIAL

## 2024-01-04 ENCOUNTER — APPOINTMENT (OUTPATIENT)
Dept: GENERAL RADIOLOGY | Facility: CLINIC | Age: 54
End: 2024-01-04
Attending: EMERGENCY MEDICINE
Payer: COMMERCIAL

## 2024-01-04 ENCOUNTER — HOSPITAL ENCOUNTER (EMERGENCY)
Facility: CLINIC | Age: 54
Discharge: HOME | End: 2024-01-04
Attending: EMERGENCY MEDICINE | Admitting: EMERGENCY MEDICINE
Payer: COMMERCIAL

## 2024-01-04 ENCOUNTER — LAB (OUTPATIENT)
Dept: LAB | Facility: CLINIC | Age: 54
End: 2024-01-04
Payer: COMMERCIAL

## 2024-01-04 VITALS
HEART RATE: 53 BPM | DIASTOLIC BLOOD PRESSURE: 77 MMHG | RESPIRATION RATE: 20 BRPM | TEMPERATURE: 98 F | OXYGEN SATURATION: 100 % | SYSTOLIC BLOOD PRESSURE: 125 MMHG

## 2024-01-04 DIAGNOSIS — E78.00 HIGH BLOOD CHOLESTEROL: ICD-10-CM

## 2024-01-04 DIAGNOSIS — R73.03 PREDIABETES: ICD-10-CM

## 2024-01-04 DIAGNOSIS — E03.9 ACQUIRED HYPOTHYROIDISM: ICD-10-CM

## 2024-01-04 DIAGNOSIS — R73.03 PRE-DIABETES: ICD-10-CM

## 2024-01-04 DIAGNOSIS — E03.4 HYPOTHYROIDISM DUE TO ACQUIRED ATROPHY OF THYROID: ICD-10-CM

## 2024-01-04 DIAGNOSIS — T18.9XXA SWALLOWED FOREIGN BODY, INITIAL ENCOUNTER: ICD-10-CM

## 2024-01-04 LAB
ALBUMIN SERPL BCG-MCNC: 4.7 G/DL (ref 3.5–5.2)
ALP SERPL-CCNC: 34 U/L (ref 40–150)
ALT SERPL W P-5'-P-CCNC: 31 U/L (ref 0–70)
ANION GAP SERPL CALCULATED.3IONS-SCNC: 12 MMOL/L (ref 7–15)
AST SERPL W P-5'-P-CCNC: 29 U/L (ref 0–45)
BILIRUB SERPL-MCNC: 0.8 MG/DL
BUN SERPL-MCNC: 15.4 MG/DL (ref 6–20)
CALCIUM SERPL-MCNC: 9.3 MG/DL (ref 8.6–10)
CHLORIDE SERPL-SCNC: 100 MMOL/L (ref 98–107)
CHOLEST SERPL-MCNC: 249 MG/DL
CREAT SERPL-MCNC: 0.98 MG/DL (ref 0.67–1.17)
DEPRECATED HCO3 PLAS-SCNC: 24 MMOL/L (ref 22–29)
EGFRCR SERPLBLD CKD-EPI 2021: >90 ML/MIN/1.73M2
FASTING STATUS PATIENT QL REPORTED: YES
GLUCOSE SERPL-MCNC: 112 MG/DL (ref 70–99)
HBA1C MFR BLD: 5.9 %
HDLC SERPL-MCNC: 53 MG/DL
LDLC SERPL CALC-MCNC: 177 MG/DL
NONHDLC SERPL-MCNC: 196 MG/DL
POTASSIUM SERPL-SCNC: 4.3 MMOL/L (ref 3.4–5.3)
PROT SERPL-MCNC: 7.9 G/DL (ref 6.4–8.3)
SODIUM SERPL-SCNC: 136 MMOL/L (ref 135–145)
T4 FREE SERPL-MCNC: 1.34 NG/DL (ref 0.9–1.7)
TRIGL SERPL-MCNC: 93 MG/DL
TSH SERPL DL<=0.005 MIU/L-ACNC: 21.02 UIU/ML (ref 0.3–4.2)

## 2024-01-04 PROCEDURE — 74019 RADEX ABDOMEN 2 VIEWS: CPT

## 2024-01-04 PROCEDURE — 82247 BILIRUBIN TOTAL: CPT

## 2024-01-04 PROCEDURE — 71046 X-RAY EXAM CHEST 2 VIEWS: CPT

## 2024-01-04 PROCEDURE — 99283 EMERGENCY DEPT VISIT LOW MDM: CPT | Mod: 25 | Performed by: EMERGENCY MEDICINE

## 2024-01-04 PROCEDURE — 83036 HEMOGLOBIN GLYCOSYLATED A1C: CPT

## 2024-01-04 PROCEDURE — 84443 ASSAY THYROID STIM HORMONE: CPT

## 2024-01-04 PROCEDURE — 74019 RADEX ABDOMEN 2 VIEWS: CPT | Mod: 26 | Performed by: RADIOLOGY

## 2024-01-04 PROCEDURE — 99283 EMERGENCY DEPT VISIT LOW MDM: CPT | Performed by: EMERGENCY MEDICINE

## 2024-01-04 PROCEDURE — 80061 LIPID PANEL: CPT

## 2024-01-04 PROCEDURE — 36415 COLL VENOUS BLD VENIPUNCTURE: CPT

## 2024-01-04 PROCEDURE — 84439 ASSAY OF FREE THYROXINE: CPT

## 2024-01-04 PROCEDURE — 71046 X-RAY EXAM CHEST 2 VIEWS: CPT | Mod: 26 | Performed by: RADIOLOGY

## 2024-01-04 ASSESSMENT — ACTIVITIES OF DAILY LIVING (ADL): ADLS_ACUITY_SCORE: 35

## 2024-01-04 NOTE — ED TRIAGE NOTES
Arrives ambulatory with concerns of swallowing a metal wire. Per patient it happened around 8pm last night. Wire was possibly in the cake. Denies abdominal pain N/V.     Triage Assessment (Adult)       Row Name 01/04/24 1526          Triage Assessment    Airway WDL WDL        Respiratory WDL    Respiratory WDL WDL        Skin Circulation/Temperature WDL    Skin Circulation/Temperature WDL WDL        Cardiac WDL    Cardiac WDL WDL        Peripheral/Neurovascular WDL    Peripheral Neurovascular WDL WDL        Cognitive/Neuro/Behavioral WDL    Cognitive/Neuro/Behavioral WDL WDL

## 2024-01-05 NOTE — DISCHARGE INSTRUCTIONS
To the emergency department if you develop chest pain, abdominal pain, difficulty swallowing, choking, blood in your stool, or recurrent vomiting.

## 2024-01-05 NOTE — ED PROVIDER NOTES
ED Provider Note  [facility]      History     Chief Complaint   Patient presents with    Swallowed Foreign Body     HPI  Natalie Ny is a 53 year old male who presents with concern that he may have swallowed a piece of wire.  Patient states he ate a piece of cake that he bought from a grocery store.  This was around 8 PM last night.  Later he found a large piece of wire in a different part of the cake.  He brought a picture on his cell phone.  This is a multi strand wire wrapped in green tape that looks possibly like floral wire.  He took apart the strand and laid the pieces out to ensure there were no missing pieces.  The smaller wire components of the strand were very thin and made of metal.  They were not particularly rigid but felt kind of flexible.  There was 1 approximately 1 cm segment of one of the small wires that was missing.  He denies chest pain, sore throat, abdominal pain, nausea, vomiting.  He is not sure if he did ingest the missing piece of wire, however, he wanted to come get evaluated to be sure.     Past Medical History  No past medical history on file.  No past surgical history on file.  albuterol (VENTOLIN HFA) 108 (90 Base) MCG/ACT inhaler  Continuous Blood Gluc  (FREESTYLE JAMES 2 READER) VENESSA  Continuous Blood Gluc Sensor (FREESTYLE JAMES 2 SENSOR) MISC  Continuous Blood Gluc Sensor (FREESTYLE JAMES 3 SENSOR) MISC  esomeprazole (NEXIUM) 20 MG DR capsule  finasteride (PROPECIA) 1 MG tablet  fluticasone (FLOVENT HFA) 110 MCG/ACT inhaler  levothyroxine (SYNTHROID/LEVOTHROID) 200 MCG tablet  nadolol (CORGARD) 20 MG tablet  predniSONE (DELTASONE) 20 MG tablet      Allergies   Allergen Reactions    Amoxicillin Rash    Penicillins Rash     Family History  No family history on file.  Social History   Social History     Tobacco Use    Smoking status: Never    Smokeless tobacco: Never   Vaping Use    Vaping Use: Never used         A medically appropriate review  of systems was performed with pertinent positives and negatives noted in the HPI, and all other systems negative.    Physical Exam   BP: 125/57  Pulse: 55  Temp: 97.9  F (36.6  C)  Resp: 20  SpO2: 98 %  Physical Exam  Vitals and nursing note reviewed.   Constitutional:       General: He is not in acute distress.     Appearance: Normal appearance. He is well-developed. He is not diaphoretic.   HENT:      Head: Normocephalic and atraumatic.      Nose: Nose normal.   Eyes:      General: No scleral icterus.     Conjunctiva/sclera: Conjunctivae normal.   Cardiovascular:      Rate and Rhythm: Normal rate.   Pulmonary:      Effort: Pulmonary effort is normal. No respiratory distress.      Breath sounds: No stridor.   Abdominal:      General: There is no distension.   Musculoskeletal:         General: No deformity. Normal range of motion.      Cervical back: Normal range of motion and neck supple. No rigidity.   Skin:     General: Skin is warm and dry.      Findings: No rash.   Neurological:      Mental Status: He is alert and oriented to person, place, and time.   Psychiatric:         Behavior: Behavior normal.         Thought Content: Thought content normal.           ED Course, Procedures, & Data      Procedures                     Results for orders placed or performed during the hospital encounter of 01/04/24   XR Chest 2 Views     Status: None    Narrative    EXAM: XR CHEST 2 VIEWS  1/4/2024 4:46 PM     HISTORY:  swallowed wire       COMPARISON:  CT 7/29/2022    FINDINGS:     PA and lateral radiograph of the chest. Trachea is midline.  Cardiomediastinal silhouette and pulmonary vasculature are within  normal limits. No focal airspace opacity, pleural effusion or  appreciable pneumothorax. No definite radiopaque foreign body.    No acute osseous abnormality. Visualized upper abdomen is  unremarkable.        Impression    IMPRESSION:   No definite radiopaque foreign body. No acute airspace disease.    I have personally  reviewed the examination and initial interpretation  and I agree with the findings.    SCOOBY ANN MD         SYSTEM ID:  [id number]   XR Abdomen 2 Views     Status: None    Narrative    EXAM: XR ABDOMEN 2 VIEWS  1/4/2024 4:46 PM     HISTORY:  swallowed wire       TECHNIQUE: Single frontal radiograph of the abdomen    COMPARISON:  None    FINDINGS:   AP upright radiograph of the abdomen. No radiopaque foreign body  identified.    Nonobstructive bowel gas pattern. No pneumatosis, portal venous gas.      Impression    IMPRESSION:   No radiopaque foreign body identified. Nonobstructive bowel gas  pattern.E    I have personally reviewed the examination and initial interpretation  and I agree with the findings.    SCOOBY ANN MD         SYSTEM ID:  [id number]   Results for orders placed or performed in visit on 01/04/24   Lipid panel reflex to direct LDL Fasting     Status: Abnormal   Result Value Ref Range    Cholesterol 249 (H) <200 mg/dL    Triglycerides 93 <150 mg/dL    Direct Measure HDL 53 >=40 mg/dL    LDL Cholesterol Calculated 177 (H) <=100 mg/dL    Non HDL Cholesterol 196 (H) <130 mg/dL    Patient Fasting > 8hrs? Yes     Narrative    Cholesterol  Desirable:  <200 mg/dL    Triglycerides  Normal:  Less than 150 mg/dL  Borderline High:  150-199 mg/dL  High:  200-499 mg/dL  Very High:  Greater than or equal to 500 mg/dL    Direct Measure HDL  Female:  Greater than or equal to 50 mg/dL   Male:  Greater than or equal to 40 mg/dL    LDL Cholesterol  Desirable:  <100mg/dL  Above Desirable:  100-129 mg/dL   Borderline High:  130-159 mg/dL   High:  160-189 mg/dL   Very High:  >= 190 mg/dL    Non HDL Cholesterol  Desirable:  130 mg/dL  Above Desirable:  130-159 mg/dL  Borderline High:  160-189 mg/dL  High:  190-219 mg/dL  Very High:  Greater than or equal to 220 mg/dL   Hemoglobin A1c     Status: Abnormal   Result Value Ref Range    Hemoglobin A1C 5.9 (H) <5.7 %   TSH with free T4 reflex     Status: Abnormal   Result  Value Ref Range    TSH 21.02 (H) 0.30 - 4.20 uIU/mL   Comprehensive metabolic panel (BMP + Alb, Alk Phos, ALT, AST, Total. Bili, TP)     Status: Abnormal   Result Value Ref Range    Sodium 136 135 - 145 mmol/L    Potassium 4.3 3.4 - 5.3 mmol/L    Carbon Dioxide (CO2) 24 22 - 29 mmol/L    Anion Gap 12 7 - 15 mmol/L    Urea Nitrogen 15.4 6.0 - 20.0 mg/dL    Creatinine 0.98 0.67 - 1.17 mg/dL    GFR Estimate >90 >60 mL/min/1.73m2    Calcium 9.3 8.6 - 10.0 mg/dL    Chloride 100 98 - 107 mmol/L    Glucose 112 (H) 70 - 99 mg/dL    Alkaline Phosphatase 34 (L) 40 - 150 U/L    AST 29 0 - 45 U/L    ALT 31 0 - 70 U/L    Protein Total 7.9 6.4 - 8.3 g/dL    Albumin 4.7 3.5 - 5.2 g/dL    Bilirubin Total 0.8 <=1.2 mg/dL   T4 free     Status: Normal   Result Value Ref Range    Free T4 1.34 0.90 - 1.70 ng/dL     Medications - No data to display  Labs Ordered and Resulted from Time of ED Arrival to Time of ED Departure - No data to display  XR Abdomen 2 Views   Final Result   IMPRESSION:    No radiopaque foreign body identified. Nonobstructive bowel gas   pattern.E      I have personally reviewed the examination and initial interpretation   and I agree with the findings.      SCOOBY ANN MD            SYSTEM ID:  [id number]      XR Chest 2 Views   Final Result   IMPRESSION:    No definite radiopaque foreign body. No acute airspace disease.      I have personally reviewed the examination and initial interpretation   and I agree with the findings.      SCOOBY ANN MD            SYSTEM ID:  [id number]             Critical care was not performed.     Medical Decision Making  The patient's presentation was of low complexity (an acute and uncomplicated illness or injury).    The patient's evaluation involved:  ordering and/or review of 2 test(s) in this encounter (see separate area of note for details)  discussion of management or test interpretation with another health professional (see separate area of note for details)    The patient's  management necessitated only low risk treatment.    Assessment & Plan    Natalie Ny is a 53 year old male who presents with concern that he may have swallowed a piece of wire.     Ddx: Accidental foreign body ingestion, metallic foreign body    X-ray of the chest and abdomen without radiopaque foreign body identified.  Non obstructive bowel gas pattern.  Discussed with GI fellow who confirmed with staff that there is nothing else to do for the patient.  No further recommendations.  Patient reassured by normal imaging.  He should return if he develops abdominal pain or other concerning symptoms.    I have reviewed the nursing notes. I have reviewed the findings, diagnosis, plan and need for follow up with the patient.    Discharge Medication List as of 1/4/2024  6:06 PM          Final diagnoses:   Swallowed foreign body, initial encounter         [facility] EMERGENCY DEPARTMENT  1/4/2024     Rita Macias MD  01/04/24 1917

## 2024-01-15 DIAGNOSIS — R73.03 PRE-DIABETES: ICD-10-CM

## 2024-01-15 DIAGNOSIS — E03.4 HYPOTHYROIDISM DUE TO ACQUIRED ATROPHY OF THYROID: ICD-10-CM

## 2024-01-15 RX ORDER — BLOOD-GLUCOSE SENSOR
1 EACH MISCELLANEOUS
Qty: 6 EACH | Refills: 3 | Status: SHIPPED | OUTPATIENT
Start: 2024-01-15

## 2024-01-15 RX ORDER — LEVOTHYROXINE SODIUM 125 UG/1
250 TABLET ORAL DAILY
Qty: 180 TABLET | Refills: 3 | Status: SHIPPED | OUTPATIENT
Start: 2024-01-15 | End: 2024-01-16

## 2024-01-16 DIAGNOSIS — E03.4 HYPOTHYROIDISM DUE TO ACQUIRED ATROPHY OF THYROID: ICD-10-CM

## 2024-01-16 RX ORDER — LEVOTHYROXINE SODIUM 200 UG/1
TABLET ORAL
Qty: 96 TABLET | Refills: 3 | Status: SHIPPED | OUTPATIENT
Start: 2024-01-16

## 2024-01-29 DIAGNOSIS — J45.20 MILD INTERMITTENT ASTHMA WITHOUT COMPLICATION: Primary | ICD-10-CM

## 2024-01-29 RX ORDER — FLUTICASONE FUROATE AND VILANTEROL 100; 25 UG/1; UG/1
1 POWDER RESPIRATORY (INHALATION) DAILY
Qty: 60 EACH | Refills: 3 | Status: SHIPPED | OUTPATIENT
Start: 2024-01-29 | End: 2024-06-13

## 2024-03-15 NOTE — TELEPHONE ENCOUNTER
Diagnosis, Referred by & from: Anal Fissure   Appt date: 5/31/2024   NOTES STATUS DETAILS   OFFICE NOTE from referring provider [facility]:  4/14/23 - PCC OV with Dr. Knox   OFFICE NOTE from other specialist [facility] Everywhere / [facility]:  3/8/22 - GI TV with Dr. Lr  12/4/18 - GI OV with Dr. Eldridge    [facility]:  8/20/20 - CR OV with Jane Cherry NP   DISCHARGE SUMMARY from hospital N/A    DISCHARGE REPORT from the ER Care Everywhere [facility]:  3/9/24 - ED OV with Dr. Pearson   OPERATIVE REPORT N/A    MEDICATION LIST Care Everywhere    LABS N/A    DIAGNOSTIC PROCEDURES     COLONOSCOPY (most recent all time after 5 years) Care Everywhere [facility]:  1/28/19 - Colonoscopy - No Specimens   UPPER ENDOSCOPY (EGD) [facility]:  5/9/22 - EGD   IMAGING (DISC & REPORT)      XRAY [facility]:  1/4/24 - XR Abdomen

## 2024-05-31 ENCOUNTER — PRE VISIT (OUTPATIENT)
Dept: SURGERY | Facility: CLINIC | Age: 54
End: 2024-05-31

## 2024-05-31 ENCOUNTER — OFFICE VISIT (OUTPATIENT)
Dept: SURGERY | Facility: CLINIC | Age: 54
End: 2024-05-31
Payer: COMMERCIAL

## 2024-05-31 VITALS — OXYGEN SATURATION: 98 % | DIASTOLIC BLOOD PRESSURE: 89 MMHG | HEART RATE: 68 BPM | SYSTOLIC BLOOD PRESSURE: 151 MMHG

## 2024-05-31 DIAGNOSIS — K60.2 ANAL FISSURE: Primary | ICD-10-CM

## 2024-05-31 PROCEDURE — 99212 OFFICE O/P EST SF 10 MIN: CPT

## 2024-05-31 ASSESSMENT — PAIN SCALES - GENERAL: PAINLEVEL: NO PAIN (0)

## 2024-05-31 NOTE — PATIENT INSTRUCTIONS
It was very nice to meet you today!    Start a daily fiber supplement. Either 1 tablespoon of Metamucil/Citrucel powder in a glass of water OR 4 capsules of Metamucil (psyllium husk) with a large glass of water. The important thing is to be consistent about taking it every day.   If the fissure flares up and you have more frequent pain or bleeding again, reach out to me on MyChart and I can send over the diltiazem ointment to the compounding pharmacy for you to use.

## 2024-05-31 NOTE — PROGRESS NOTES
Colon and Rectal Surgery Consult Clinic Note    RE: Natalie Ny  : 1970  CAMPOS: 2024    Natalie Ny is a very pleasant 53 year old male here for follow up of anal fissure.     Natalie was last in clinic on 2020. He saw Jane Bobo NP for intermittent bright red blood with wiping and a tearing sensation with larger/harder bowel movements. She saw a superficial posterior midline anal fissure and gave him topical diltiazem. He recalls using this and found it helpful. He thinks that he did not have any issues for a year following this, however the same symptoms have returned. They were especially bad in the beginning of the year when it seemed to occur every few weeks for several months. This eventually got better, and he last had pain and bleeding a month ago. He typically eats a high fiber diet which helps him have regular soft bowel movements, but he intermittently will have stools that are larger caliber or harder. At baseline he has 0-3 bowel movements per day.    Physical Examination: Exam was chaperoned by Alfredo Neff, EMT-P   BP (!) 151/89 (BP Location: Left arm, Patient Position: Sitting, Cuff Size: Adult Regular)   Pulse 68   SpO2 98%   General: alert, oriented, in no acute distress, sitting comfortably  Perianal external examination:  Perianal skin: Intact with no excoriation or lichenification.  Lesions: No evidence of an external lesion, nodularity, or induration in the perianal region.  Eversion of buttocks: There was evidence of an anal fissure. Details: at the posterior midline there is a superficial fissure that starts to open with eversion.  Skin tags or external hemorrhoids: None.    Digital rectal examination: Was deferred    Anoscopy: Was deferred    Assessment/Plan: Natalie Ny is a 53 year old male with chronic/recurrent anal fissure. He previously was treated with topical diltiazem and had improvement with this. Since his symptoms are mild now and  mostly occur with larger bowel movements, I do not think another round of topical therapy is indicated currently. We discussed the chronic and recurrent nature of anal fissures and importance of maintaining soft bowel movements to prevent a recurrence. Recommended starting a daily fiber supplement and staying on this indefinitely regardless of dietary fiber intake. Follow up as needed. Patient's questions were answered to his stated satisfaction and he is in agreement with this plan.     Medical history:  No past medical history on file.    Surgical history:  No past surgical history on file.    Problem list:    Patient Active Problem List    Diagnosis Date Noted    Syncope and collapse 07/19/2012     Priority: Medium    Grave's disease 12/18/2011     Priority: Medium     IMO update changed this record. Please review for accuracy         Medications:  Current Outpatient Medications   Medication Sig Dispense Refill    albuterol (VENTOLIN HFA) 108 (90 Base) MCG/ACT inhaler Inhale 2 puffs into the lungs every 6 hours as needed for shortness of breath 18 g 3    Continuous Blood Gluc  (FREESTYLE JAMES 2 READER) VENESSA 1 each See Admin Instructions Use per 's instructions for continuous glucose monitoring. 1 each 1    Continuous Blood Gluc Sensor (FREESTYLE JAMES 2 SENSOR) MISC 1 each See Admin Instructions Change every 14 days. 6 each 3    Continuous Blood Gluc Sensor (FREESTYLE JAMES 3 SENSOR) MISC 1 each every 14 days 6 each 3    esomeprazole (NEXIUM) 20 MG DR capsule Take 20 mg by mouth daily      finasteride (PROPECIA) 1 MG tablet Take 1 tablet (1 mg) by mouth daily 90 tablet 3    fluticasone-vilanterol (BREO ELLIPTA) 100-25 MCG/ACT inhaler Inhale 1 puff into the lungs daily 60 each 3    levothyroxine (SYNTHROID/LEVOTHROID) 200 MCG tablet Take 1 tablet daily on 6 days of week and 2 tablets on the 7th day of the week 96 tablet 3    nadolol (CORGARD) 20 MG tablet Take 1 tablet (20 mg) by mouth daily  90 tablet 0    predniSONE (DELTASONE) 20 MG tablet Take 1 tablet (20 mg) by mouth daily 7 tablet 0       Allergies:  Allergies   Allergen Reactions    Amoxicillin Rash    Penicillins Rash       Family history:  No family history on file.    Social history:  Social History     Tobacco Use    Smoking status: Never    Smokeless tobacco: Never   Substance Use Topics    Alcohol use: Not on file    Marital status: .  Occupation: Ophthalmologist.    Nursing Notes:   Alfredo Neff, EMT  5/31/2024  3:04 PM  Signed  Chief Complaint   Patient presents with    Consult       Vitals:    05/31/24 1502   BP: (!) 151/89   BP Location: Left arm   Patient Position: Sitting   Cuff Size: Adult Regular   Pulse: 68   SpO2: 98%       There is no height or weight on file to calculate BMI.    Alfredo Neff EMT-P       20 minutes spent on the date of encounter performing chart review, history and exam, documentation and further activities as noted above.    ----------------------------------------------------  Marisol Terrazas PA-C  Colon and Rectal Surgery   [facility]

## 2024-05-31 NOTE — LETTER
2024       RE: Natalie Ny  [address]  [place] [address]     Dear Colleague,    Thank you for referring your patient, Natalie Ny, to the [facility] [place] [facility]. Please see a copy of my visit note below.    Colon and Rectal Surgery Consult Clinic Note    RE: Natalie Ny  : 1970  CAMPOS: 2024    Natalie Ny is a very pleasant 53 year old male here for follow up of anal fissure.     Natalie was last in clinic on 2020. He saw Jane Bobo NP for intermittent bright red blood with wiping and a tearing sensation with larger/harder bowel movements. She saw a superficial posterior midline anal fissure and gave him topical diltiazem. He recalls using this and found it helpful. He thinks that he did not have any issues for a year following this, however the same symptoms have returned. They were especially bad in the beginning of the year when it seemed to occur every few weeks for several months. This eventually got better, and he last had pain and bleeding a month ago. He typically eats a high fiber diet which helps him have regular soft bowel movements, but he intermittently will have stools that are larger caliber or harder. At baseline he has 0-3 bowel movements per day.    Physical Examination: Exam was chaperoned by Alfredo Neff, EMT-P   BP (!) 151/89 (BP Location: Left arm, Patient Position: Sitting, Cuff Size: Adult Regular)   Pulse 68   SpO2 98%   General: alert, oriented, in no acute distress, sitting comfortably  Perianal external examination:  Perianal skin: Intact with no excoriation or lichenification.  Lesions: No evidence of an external lesion, nodularity, or induration in the perianal region.  Eversion of buttocks: There was evidence of an anal fissure. Details: at the posterior midline there is a superficial fissure that starts to open with  eversion.  Skin tags or external hemorrhoids: None.    Digital rectal examination: Was deferred    Anoscopy: Was deferred    Assessment/Plan: Natalie Ny is a 53 year old male with chronic/recurrent anal fissure. He previously was treated with topical diltiazem and had improvement with this. Since his symptoms are mild now and mostly occur with larger bowel movements, I do not think another round of topical therapy is indicated currently. We discussed the chronic and recurrent nature of anal fissures and importance of maintaining soft bowel movements to prevent a recurrence. Recommended starting a daily fiber supplement and staying on this indefinitely regardless of dietary fiber intake. Follow up as needed. Patient's questions were answered to his stated satisfaction and he is in agreement with this plan.     Medical history:  No past medical history on file.    Surgical history:  No past surgical history on file.    Problem list:    Patient Active Problem List    Diagnosis Date Noted    Syncope and collapse 07/19/2012     Priority: Medium    Grave's disease 12/18/2011     Priority: Medium     IMO update changed this record. Please review for accuracy         Medications:  Current Outpatient Medications   Medication Sig Dispense Refill    albuterol (VENTOLIN HFA) 108 (90 Base) MCG/ACT inhaler Inhale 2 puffs into the lungs every 6 hours as needed for shortness of breath 18 g 3    Continuous Blood Gluc  (FREESTYLE JAMES 2 READER) VENESSA 1 each See Admin Instructions Use per 's instructions for continuous glucose monitoring. 1 each 1    Continuous Blood Gluc Sensor (FREESTYLE JAMES 2 SENSOR) MISC 1 each See Admin Instructions Change every 14 days. 6 each 3    Continuous Blood Gluc Sensor (FREESTYLE JAMES 3 SENSOR) MISC 1 each every 14 days 6 each 3    esomeprazole (NEXIUM) 20 MG DR capsule Take 20 mg by mouth daily      finasteride (PROPECIA) 1 MG tablet Take 1 tablet (1 mg) by mouth daily 90  tablet 3    fluticasone-vilanterol (BREO ELLIPTA) 100-25 MCG/ACT inhaler Inhale 1 puff into the lungs daily 60 each 3    levothyroxine (SYNTHROID/LEVOTHROID) 200 MCG tablet Take 1 tablet daily on 6 days of week and 2 tablets on the 7th day of the week 96 tablet 3    nadolol (CORGARD) 20 MG tablet Take 1 tablet (20 mg) by mouth daily 90 tablet 0    predniSONE (DELTASONE) 20 MG tablet Take 1 tablet (20 mg) by mouth daily 7 tablet 0       Allergies:  Allergies   Allergen Reactions    Amoxicillin Rash    Penicillins Rash       Family history:  No family history on file.    Social history:  Social History     Tobacco Use    Smoking status: Never    Smokeless tobacco: Never   Substance Use Topics    Alcohol use: Not on file    Marital status: .  Occupation: Ophthalmologist.    Nursing Notes:   Alfredo Neff, EMT  5/31/2024  3:04 PM  Signed  Chief Complaint   Patient presents with    Consult       Vitals:    05/31/24 1502   BP: (!) 151/89   BP Location: Left arm   Patient Position: Sitting   Cuff Size: Adult Regular   Pulse: 68   SpO2: 98%       There is no height or weight on file to calculate BMI.    Alfredo Neff EMT-P       20 minutes spent on the date of encounter performing chart review, history and exam, documentation and further activities as noted above.           Again, thank you for allowing me to participate in the care of your patient.      Sincerely,    Marisol Terrazas PA-C

## 2024-05-31 NOTE — NURSING NOTE
Chief Complaint   Patient presents with    Consult       Vitals:    05/31/24 1502   BP: (!) 151/89   BP Location: Left arm   Patient Position: Sitting   Cuff Size: Adult Regular   Pulse: 68   SpO2: 98%       There is no height or weight on file to calculate BMI.    Alfredo Neff EMT-P

## 2024-06-13 ENCOUNTER — LAB (OUTPATIENT)
Dept: LAB | Facility: CLINIC | Age: 54
End: 2024-06-13
Payer: COMMERCIAL

## 2024-06-13 ENCOUNTER — OFFICE VISIT (OUTPATIENT)
Dept: FAMILY MEDICINE | Facility: CLINIC | Age: 54
End: 2024-06-13
Payer: COMMERCIAL

## 2024-06-13 VITALS
BODY MASS INDEX: 27.33 KG/M2 | OXYGEN SATURATION: 96 % | WEIGHT: 194.8 LBS | HEART RATE: 59 BPM | DIASTOLIC BLOOD PRESSURE: 81 MMHG | SYSTOLIC BLOOD PRESSURE: 131 MMHG

## 2024-06-13 DIAGNOSIS — J45.20 MILD INTERMITTENT ASTHMA WITHOUT COMPLICATION: ICD-10-CM

## 2024-06-13 DIAGNOSIS — E03.9 ACQUIRED HYPOTHYROIDISM: ICD-10-CM

## 2024-06-13 DIAGNOSIS — Z00.00 ROUTINE GENERAL MEDICAL EXAMINATION AT A HEALTH CARE FACILITY: ICD-10-CM

## 2024-06-13 DIAGNOSIS — E03.4 HYPOTHYROIDISM DUE TO ACQUIRED ATROPHY OF THYROID: ICD-10-CM

## 2024-06-13 DIAGNOSIS — L65.9 HAIR LOSS: Primary | ICD-10-CM

## 2024-06-13 DIAGNOSIS — K21.9 CHRONIC GERD: ICD-10-CM

## 2024-06-13 DIAGNOSIS — E78.00 HIGH BLOOD CHOLESTEROL: ICD-10-CM

## 2024-06-13 DIAGNOSIS — K62.5 RECTAL BLEEDING: ICD-10-CM

## 2024-06-13 DIAGNOSIS — R73.03 PREDIABETES: ICD-10-CM

## 2024-06-13 DIAGNOSIS — R05.9 COUGH, UNSPECIFIED TYPE: ICD-10-CM

## 2024-06-13 LAB
ALBUMIN SERPL BCG-MCNC: 4.7 G/DL (ref 3.5–5.2)
ALP SERPL-CCNC: 31 U/L (ref 40–150)
ALT SERPL W P-5'-P-CCNC: 14 U/L (ref 0–70)
ANION GAP SERPL CALCULATED.3IONS-SCNC: 10 MMOL/L (ref 7–15)
AST SERPL W P-5'-P-CCNC: 25 U/L (ref 0–45)
BASOPHILS # BLD AUTO: 0 10E3/UL (ref 0–0.2)
BASOPHILS NFR BLD AUTO: 1 %
BILIRUB SERPL-MCNC: 0.7 MG/DL
BUN SERPL-MCNC: 16.7 MG/DL (ref 6–20)
CALCIUM SERPL-MCNC: 9.7 MG/DL (ref 8.6–10)
CHLORIDE SERPL-SCNC: 105 MMOL/L (ref 98–107)
CHOLEST SERPL-MCNC: 221 MG/DL
CREAT SERPL-MCNC: 1.07 MG/DL (ref 0.67–1.17)
DEPRECATED HCO3 PLAS-SCNC: 27 MMOL/L (ref 22–29)
EGFRCR SERPLBLD CKD-EPI 2021: 83 ML/MIN/1.73M2
EOSINOPHIL # BLD AUTO: 0.1 10E3/UL (ref 0–0.7)
EOSINOPHIL NFR BLD AUTO: 1 %
ERYTHROCYTE [DISTWIDTH] IN BLOOD BY AUTOMATED COUNT: 12.5 % (ref 10–15)
FASTING STATUS PATIENT QL REPORTED: YES
FASTING STATUS PATIENT QL REPORTED: YES
GLUCOSE SERPL-MCNC: 114 MG/DL (ref 70–99)
HBA1C MFR BLD: 6.2 %
HCT VFR BLD AUTO: 41.6 % (ref 40–53)
HDLC SERPL-MCNC: 53 MG/DL
HGB BLD-MCNC: 14.3 G/DL (ref 13.3–17.7)
IMM GRANULOCYTES # BLD: 0 10E3/UL
IMM GRANULOCYTES NFR BLD: 0 %
LDLC SERPL CALC-MCNC: 146 MG/DL
LYMPHOCYTES # BLD AUTO: 2.6 10E3/UL (ref 0.8–5.3)
LYMPHOCYTES NFR BLD AUTO: 35 %
MCH RBC QN AUTO: 30 PG (ref 26.5–33)
MCHC RBC AUTO-ENTMCNC: 34.4 G/DL (ref 31.5–36.5)
MCV RBC AUTO: 87 FL (ref 78–100)
MONOCYTES # BLD AUTO: 0.6 10E3/UL (ref 0–1.3)
MONOCYTES NFR BLD AUTO: 8 %
NEUTROPHILS # BLD AUTO: 4.2 10E3/UL (ref 1.6–8.3)
NEUTROPHILS NFR BLD AUTO: 55 %
NONHDLC SERPL-MCNC: 168 MG/DL
NRBC # BLD AUTO: 0 10E3/UL
NRBC BLD AUTO-RTO: 0 /100
PLATELET # BLD AUTO: 118 10E3/UL (ref 150–450)
POTASSIUM SERPL-SCNC: 4.1 MMOL/L (ref 3.4–5.3)
PROT SERPL-MCNC: 7.7 G/DL (ref 6.4–8.3)
RBC # BLD AUTO: 4.76 10E6/UL (ref 4.4–5.9)
SODIUM SERPL-SCNC: 142 MMOL/L (ref 135–145)
TRIGL SERPL-MCNC: 112 MG/DL
TSH SERPL DL<=0.005 MIU/L-ACNC: 2.31 UIU/ML (ref 0.3–4.2)
WBC # BLD AUTO: 7.5 10E3/UL (ref 4–11)

## 2024-06-13 PROCEDURE — 84443 ASSAY THYROID STIM HORMONE: CPT | Performed by: INTERNAL MEDICINE

## 2024-06-13 PROCEDURE — 36415 COLL VENOUS BLD VENIPUNCTURE: CPT | Performed by: PATHOLOGY

## 2024-06-13 PROCEDURE — 80061 LIPID PANEL: CPT | Performed by: PATHOLOGY

## 2024-06-13 PROCEDURE — 99000 SPECIMEN HANDLING OFFICE-LAB: CPT | Performed by: PATHOLOGY

## 2024-06-13 PROCEDURE — 99396 PREV VISIT EST AGE 40-64: CPT | Performed by: FAMILY MEDICINE

## 2024-06-13 PROCEDURE — 83036 HEMOGLOBIN GLYCOSYLATED A1C: CPT | Performed by: FAMILY MEDICINE

## 2024-06-13 PROCEDURE — 80053 COMPREHEN METABOLIC PANEL: CPT | Performed by: PATHOLOGY

## 2024-06-13 PROCEDURE — 85025 COMPLETE CBC W/AUTO DIFF WBC: CPT | Performed by: PATHOLOGY

## 2024-06-13 RX ORDER — FINASTERIDE 1 MG/1
1 TABLET, FILM COATED ORAL DAILY
Qty: 90 TABLET | Refills: 3 | Status: SHIPPED | OUTPATIENT
Start: 2024-06-13 | End: 2024-07-31

## 2024-06-13 RX ORDER — FLUTICASONE FUROATE AND VILANTEROL 100; 25 UG/1; UG/1
1 POWDER RESPIRATORY (INHALATION) DAILY
Qty: 60 EACH | Refills: 3 | Status: SHIPPED | OUTPATIENT
Start: 2024-06-13

## 2024-06-13 RX ORDER — FAMOTIDINE 20 MG/1
20 TABLET, FILM COATED ORAL DAILY
COMMUNITY

## 2024-06-13 RX ORDER — ALBUTEROL SULFATE 90 UG/1
2 AEROSOL, METERED RESPIRATORY (INHALATION) EVERY 6 HOURS PRN
Qty: 18 G | Refills: 3 | Status: SHIPPED | OUTPATIENT
Start: 2024-06-13

## 2024-06-13 SDOH — HEALTH STABILITY: PHYSICAL HEALTH: ON AVERAGE, HOW MANY DAYS PER WEEK DO YOU ENGAGE IN MODERATE TO STRENUOUS EXERCISE (LIKE A BRISK WALK)?: 2 DAYS

## 2024-06-13 ASSESSMENT — SOCIAL DETERMINANTS OF HEALTH (SDOH): HOW OFTEN DO YOU GET TOGETHER WITH FRIENDS OR RELATIVES?: ONCE A WEEK

## 2024-06-13 ASSESSMENT — ASTHMA QUESTIONNAIRES
ACT_TOTALSCORE: 25
QUESTION_2 LAST FOUR WEEKS HOW OFTEN HAVE YOU HAD SHORTNESS OF BREATH: NOT AT ALL
ACT_TOTALSCORE: 25
QUESTION_1 LAST FOUR WEEKS HOW MUCH OF THE TIME DID YOUR ASTHMA KEEP YOU FROM GETTING AS MUCH DONE AT WORK, SCHOOL OR AT HOME: NONE OF THE TIME
QUESTION_4 LAST FOUR WEEKS HOW OFTEN HAVE YOU USED YOUR RESCUE INHALER OR NEBULIZER MEDICATION (SUCH AS ALBUTEROL): NOT AT ALL
QUESTION_5 LAST FOUR WEEKS HOW WOULD YOU RATE YOUR ASTHMA CONTROL: COMPLETELY CONTROLLED
QUESTION_3 LAST FOUR WEEKS HOW OFTEN DID YOUR ASTHMA SYMPTOMS (WHEEZING, COUGHING, SHORTNESS OF BREATH, CHEST TIGHTNESS OR PAIN) WAKE YOU UP AT NIGHT OR EARLIER THAN USUAL IN THE MORNING: NOT AT ALL

## 2024-06-13 NOTE — PROGRESS NOTES
Preventive Care Visit  [facility]  Jed Knox MD, Family Medicine  Jun 13, 2024      Assessment & Plan     Hair loss  Helps, tolerated  - finasteride (PROPECIA) 1 MG tablet; Take 1 tablet (1 mg) by mouth daily    Acquired hypothyroidism  Due  - finasteride (PROPECIA) 1 MG tablet; Take 1 tablet (1 mg) by mouth daily  - TSH with free T4 reflex; Future    High blood cholesterol  Due  - finasteride (PROPECIA) 1 MG tablet; Take 1 tablet (1 mg) by mouth daily  - Lipid panel reflex to direct LDL Fasting; Future  - Comprehensive metabolic panel (BMP + Alb, Alk Phos, ALT, AST, Total. Bili, TP); Future    Cough, unspecified type  Prn  - albuterol (VENTOLIN HFA) 108 (90 Base) MCG/ACT inhaler; Inhale 2 puffs into the lungs every 6 hours as needed for shortness of breath    Mild intermittent asthma without complication  Uses in flares  - fluticasone-vilanterol (BREO ELLIPTA) 100-25 MCG/ACT inhaler; Inhale 1 puff into the lungs daily    Chronic GERD    - Adult GI  Referral - Procedure Only; Future    Rectal bleeding  Likely fissure  - Adult GI  Referral - Procedure Only; Future  - CBC with platelets and differential; Future    Prediabetes  Works on healthy lifestyle as can  - Hemoglobin A1c; Future    Routine general medical examination at a health care facility  Done        Counseling  Appropriate preventive services were discussed with this patient, including applicable screening as appropriate for fall prevention, nutrition, physical activity, Tobacco-use cessation, weight loss and cognition.  Checklist reviewing preventive services available has been given to the patient.  Reviewed patient's diet, addressing concerns and/or questions.   He is at risk for lack of exercise and has been provided with information to increase physical activity for the benefit of his well-being.   The patient was instructed to see the dentist every 6 months.   He is at risk  for psychosocial distress and has been provided with information to reduce risk.         Return in about 53 weeks (around 2025) for Annual Wellness Visit.    Dalton Estrada is a 53 year old, presenting for the followin/13/2024     1:30 PM   Additional Questions   Roomed by JEA EMT            HPI  Eye MD at   , three young children   Overall doing well  Interval history reviewed  Due tommy bee (care ev), will add egd for chronic gerd        2024   General Health   How would you rate your overall physical health? Good   Feel stress (tense, anxious, or unable to sleep) Only a little   (!) STRESS CONCERN      2024   Nutrition   Three or more servings of calcium each day? (!) I DON'T KNOW   Diet: Regular (no restrictions)   How many servings of fruit and vegetables per day? (!) 2-3   How many sweetened beverages each day? 0-1         2024   Exercise   Days per week of moderate/strenous exercise 2 days   (!) EXERCISE CONCERN      2024   Social Factors   Frequency of gathering with friends or relatives Once a week   Worry food won't last until get money to buy more No   Food not last or not have enough money for food? No   Do you have housing?  Yes   Are you worried about losing your housing? No   Lack of transportation? No   Unable to get utilities (heat,electricity)? No         2024   Fall Risk   Fallen 2 or more times in the past year? No   Trouble with walking or balance? No          2024   Dental   Dentist two times every year? (!) NO         2024   TB Screening   Were you born outside of the [place]? No         Today's PHQ-2 Score:       2024     1:31 PM   PHQ-2 (  [employer])   Q1: Little interest or pleasure in doing things 0   Q2: Feeling down, depressed or hopeless 0   PHQ-2 Score 0   Q1: Little interest or pleasure in doing things Not at all   Q2: Feeling down, depressed or hopeless Not at all   PHQ-2 Score 0           2024   Substance  "Use   Alcohol more than 3/day or more than 7/wk Not Applicable   Do you use any other substances recreationally? No     Social History     Tobacco Use    Smoking status: Never    Smokeless tobacco: Never   Vaping Use    Vaping status: Never Used           6/13/2024   STI Screening   New sexual partner(s) since last STI/HIV test? No   ASCVD Risk   The 10-year ASCVD risk score (Aarti ANGEL, et al., 2019) is: 5.5%    Values used to calculate the score:      Age: 53 years      Sex: Male      Is Non- : No      Diabetic: No      Tobacco smoker: No      Systolic Blood Pressure: 131 mmHg      Is BP treated: No      HDL Cholesterol: 53 mg/dL      Total Cholesterol: 249 mg/dL          Reviewed and updated as needed this visit by Provider                         Objective    Exam  /81 (BP Location: Right arm, Patient Position: Sitting, Cuff Size: Adult Regular)   Pulse 59   Wt 88.4 kg (194 lb 12.8 oz)   SpO2 96%   BMI 27.33 kg/m     Estimated body mass index is 27.33 kg/m  as calculated from the following:    Height as of 7/18/22: 1.798 m (5' 10.79\").    Weight as of this encounter: 88.4 kg (194 lb 12.8 oz).    Physical Exam  GENERAL: alert and no distress  EYES: Eyes grossly normal to inspection, PERRL and conjunctivae and sclerae normal  HENT: ear canals and TM's normal, nose and mouth without ulcers or lesions  NECK: no adenopathy, no asymmetry, masses, or scars  RESP: lungs clear to auscultation - no rales, rhonchi or wheezes  CV: regular rate and rhythm, normal S1 S2, no S3 or S4, no murmur, click or rub, no peripheral edema  ABDOMEN: soft, nontender, no hepatosplenomegaly, no masses and bowel sounds normal   (male): normal male genitalia without lesions or urethral discharge, no hernia  MS: no gross musculoskeletal defects noted, no edema  SKIN: no suspicious lesions or rashes  NEURO: Normal strength and tone, mentation intact and speech normal  PSYCH: mentation appears normal, " affect normal/bright        Signed Electronically by: Jed Knox MD

## 2024-06-14 NOTE — PATIENT INSTRUCTIONS
"Patient Education   Preventive Care Advice   This is general advice we often give to help people stay healthy. Your care team may have specific advice just for you. Please talk to your care team about your own preventive care needs.  Lifestyle  Exercise at least 150 minutes each week (30 minutes a day, 5 days a week).  Do muscle strengthening activities 2 days a week. These help control your weight and prevent disease.  No smoking.  Wear sunscreen to prevent skin cancer.  Have your home tested for radon every 2 to 5 years. Radon is a colorless, odorless gas that can harm your lungs. To learn more, go to www.health.[facility].[place] and search for \"Radon in Homes.\"  Keep guns unloaded and locked up in a safe place like a safe or gun vault, or, use a gun lock and hide the keys. Always lock away bullets separately. To learn more, visit [employer].[place].gov and search for \"safe gun storage.\"  Nutrition  Eat 5 or more servings of fruits and vegetables each day.  Try wheat bread, brown rice and whole grain pasta (instead of white bread, rice, and pasta).  Get enough calcium and vitamin D. Check the label on foods and aim for 100% of the RDA (recommended daily allowance).  Regular exams  Have a dental exam and cleaning every 6 months.  See your health care team every year to talk about:  Any changes in your health.  Any medicines your care team has prescribed.  Preventive care, family planning, and ways to prevent chronic diseases.  Shots (vaccines)   HPV shots (up to age 26), if you've never had them before.  Hepatitis B shots (up to age 59), if you've never had them before.  COVID-19 shot: Get this shot when it's due.  Flu shot: Get a flu shot every year.  Tetanus shot: Get a tetanus shot every 10 years.  Pneumococcal, hepatitis A, and RSV shots: Ask your care team if you need these based on your risk.  Shingles shot (for age 50 and up).  General health tests  Diabetes screening:  Starting at age 35, Get screened for diabetes at least " every 3 years.  If you are younger than age 35, ask your care team if you should be screened for diabetes.  Cholesterol test: At age 39, start having a cholesterol test every 5 years, or more often if advised.  Bone density scan (DEXA): At age 50, ask your care team if you should have this scan for osteoporosis (brittle bones).  Hepatitis C: Get tested at least once in your life.  Abdominal aortic aneurysm screening: Talk to your doctor about having this screening if you:  Have ever smoked; and  Are biologically male; and  Are between the ages of 65 and 75.  STIs (sexually transmitted infections)  Before age 24: Ask your care team if you should be screened for STIs.  After age 24: Get screened for STIs if you're at risk. You are at risk for STIs (including HIV) if:  You are sexually active with more than one person.  You don't use condoms every time.  You or a partner was diagnosed with a sexually transmitted infection.  If you are at risk for HIV, ask about PrEP medicine to prevent HIV.  Get tested for HIV at least once in your life, whether you are at risk for HIV or not.  Cancer screening tests  Cervical cancer screening: If you have a cervix, begin getting regular cervical cancer screening tests at age 21. Most people who have regular screenings with normal results can stop after age 65. Talk about this with your provider.  Breast cancer scan (mammogram): If you've ever had breasts, begin having regular mammograms starting at age 40. This is a scan to check for breast cancer.  Colon cancer screening: It is important to start screening for colon cancer at age 45.  Have a colonoscopy test every 10 years (or more often if you're at risk) Or, ask your provider about stool tests like a FIT test every year or Cologuard test every 3 years.  To learn more about your testing options, visit: www.[employer]/711489.pdf.  For help making a decision, visit: karla/wf79001.  Prostate cancer screening test: If you have a  prostate and are age 55 to 69, ask your provider if you would benefit from a yearly prostate cancer screening test.  Lung cancer screening: If you are a current or former smoker age 50 to 80, ask your care team if ongoing lung cancer screenings are right for you.  For informational purposes only. Not to replace the advice of your health care provider. Copyright   2023 [facility]. All rights reserved. Clinically reviewed by the [facility] Transitions Program. [employer] 811514 - REV 04/24.

## 2024-06-19 ENCOUNTER — TELEPHONE (OUTPATIENT)
Dept: FAMILY MEDICINE | Facility: CLINIC | Age: 54
End: 2024-06-19
Payer: COMMERCIAL

## 2024-06-19 NOTE — TELEPHONE ENCOUNTER
Left Voicemail (1st Attempt) for the patient to call back and schedule the following:    Appointment type: Colonoscopy / EGD  Provider: per PCP  Return date: any  Specialty phone number: [phone]

## 2024-07-30 ENCOUNTER — MYC MEDICAL ADVICE (OUTPATIENT)
Dept: FAMILY MEDICINE | Facility: CLINIC | Age: 54
End: 2024-07-30
Payer: COMMERCIAL

## 2024-07-30 DIAGNOSIS — E03.9 ACQUIRED HYPOTHYROIDISM: ICD-10-CM

## 2024-07-30 DIAGNOSIS — L65.9 HAIR LOSS: ICD-10-CM

## 2024-07-30 DIAGNOSIS — E78.00 HIGH BLOOD CHOLESTEROL: ICD-10-CM

## 2024-07-31 RX ORDER — FINASTERIDE 1 MG/1
1 TABLET, FILM COATED ORAL DAILY
Qty: 90 TABLET | Refills: 3 | Status: SHIPPED | OUTPATIENT
Start: 2024-07-31

## 2024-08-19 ENCOUNTER — TELEPHONE (OUTPATIENT)
Dept: ENDOCRINOLOGY | Facility: CLINIC | Age: 54
End: 2024-08-19
Payer: COMMERCIAL

## 2024-08-19 NOTE — TELEPHONE ENCOUNTER
Patient confirmed scheduled appointment:  Date: 11/18  Time: 9:30 am  Visit type: return endocrine  Provider: Dr. Calvo  Location: virtual  Testing/imaging:   Additional notes: patient rescheduled from 11/04 (provider unavailable) to 11/18    Reba Hirsch on 8/19/2024 at 10:31 AM

## 2024-11-04 NOTE — PROGRESS NOTES
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  He is status post radioactive iodine ablation in 2007, and has been on levothyroxine replacement ever since.  He is currently taking 200 mcg each day.  He does admit that he sometimes misses the dose.  When that happens, he doubles up the next day.    He noted no change in his weight, palpitations, shortness of breath, or temperature tolerance.  Overall he feels well.      He was noted to have prediabetes based on A1c a couple of years ago he changed his diet and lost some weight that is now stabilized.  He continues to use the continuous glucose monitor that he finds very helpful.  We have no data to review today but he thinks his calculated A1c is in the 6s.  His primary care doctor checked his cholesterol and told him it was a little bit high.  He is finding it challenging to avoid all high fat foods.  He is not against taking a lipid lowering drug if necessary.    He has no paresthesias.  He has no foot ulcers.    Current Outpatient Medications   Medication Sig Dispense Refill    albuterol (VENTOLIN HFA) 108 (90 Base) MCG/ACT inhaler Inhale 2 puffs into the lungs every 6 hours as needed for shortness of breath 18 g 3    Continuous Blood Gluc  (FREESTYLE JAMES 2 READER) VENESSA 1 each See Admin Instructions Use per 's instructions for continuous glucose monitoring. 1 each 1    Continuous Blood Gluc Sensor (FREESTYLE JAMES 2 SENSOR) MISC 1 each See Admin Instructions Change every 14 days. 6 each 3    Continuous Blood Gluc Sensor (FREESTYLE JAMES 3 SENSOR) MISC 1 each every 14 days 6 each 3    esomeprazole (NEXIUM) 20 MG DR capsule Take 20 mg by mouth daily      famotidine (PEPCID) 20 MG tablet Take 20 mg by mouth daily      finasteride (PROPECIA) 1 MG tablet Take 1 tablet (1 mg) by mouth daily 90 tablet 3    fluticasone-vilanterol (BREO ELLIPTA) 100-25 MCG/ACT inhaler Inhale 1 puff into the lungs daily 60 each 3    levothyroxine  "(SYNTHROID/LEVOTHROID) 200 MCG tablet Take 1 tablet daily on 6 days of week and 2 tablets on the 7th day of the week 96 tablet 3    nadolol (CORGARD) 20 MG tablet Take 1 tablet (20 mg) by mouth daily 90 tablet 0     No current facility-administered medications for this visit.                59  as of 6/13/2024       BP: 131/81 131/81  as of 6/13/2024     Resp: -- 20  as of 1/4/2024     SpO2: 96 % 96%  as of 6/13/2024     Body Mass Index:  None     Systolic (Patient Repo...: -- Not taken     Diastolic (Patient Rep...: -- Not taken     Height: -- 1.798 m (5' 10.79\")   as of 7/18/2022     Weight: 88.4 kg (194 lb 12.8 oz) 88.4 kg (194 lb 12.8 oz)  as of 6/13/2024     Body Surface Area:  None     On exam he is in no acute distress     Latest Ref Rng 5/5/2022  2:56 PM 1/4/2024  2:22 PM 6/13/2024  2:27 PM   ENDO THYROID LABS-[facility]       TSH 0.40 - 4.00 mU/L 0.02 (L)      TSH 0.30 - 4.20 uIU/mL  21.02 (H)  2.31    Triiodothyronine (T3) 60 - 181 ng/dL      FREE T4 0.90 - 1.70 ng/dL 1.42  1.34        Recent Labs   Lab Test 06/13/24  1427 01/04/24  1422 07/25/22  0734 05/05/22  1456   A1C 6.2* 5.9*  --  6.4*   TSH 2.31 21.02*  --  0.02*   T4  --  1.34  --  1.42   * 177*   < > 168*   HDL 53 53   < > 50   TRIG 112 93   < > 74   CR 1.07 0.98  --  0.95    < > = values in this interval not displayed.       Assessment and plan:    1.radioiodine induced hypothyroidism.  I will check his TSH.    2.prediabetes.  I will check his A1c.  I reminded him of the importance of managing his risk factors.  He will try to limit the fat in his diet.  If his LDL remains up above 130 when he is 56, we may want to start a statin at that time.  I note his blood pressure was normal earlier this year.    Follow-up with me in 1 year.    I spent 5 minutes on the video visit with the patient today (start time 9: 3 0 and end time 9: 3 5).  On the same day of visit I spent an additional 10 minutes reviewing his chart, reviewing and interpreting labs, " ordering tests, and doing documentation.  The visit was done at my office away from clinic.    Jane Calvo MD

## 2024-11-11 NOTE — TELEPHONE ENCOUNTER
DIAGNOSIS: Left shoulder pain with pull down activities   APPOINTMENT DATE: 07/23/2022   NOTES STATUS DETAILS   OFFICE NOTE from referring provider N/A    OFFICE NOTE from other specialist N/A    DISCHARGE SUMMARY from hospital N/A    DISCHARGE REPORT from the ER N/A    OPERATIVE REPORT N/A    EMG report N/A    MEDICATION LIST N/A    MRI N/A    DEXA (osteoporosis/bone health) N/A    CT SCAN N/A    XRAYS (IMAGES & REPORTS) N/A             supervision/verbal cues

## 2024-11-18 ENCOUNTER — VIRTUAL VISIT (OUTPATIENT)
Dept: ENDOCRINOLOGY | Facility: CLINIC | Age: 54
End: 2024-11-18
Payer: COMMERCIAL

## 2024-11-18 DIAGNOSIS — E03.4 HYPOTHYROIDISM DUE TO ACQUIRED ATROPHY OF THYROID: Primary | ICD-10-CM

## 2024-11-18 DIAGNOSIS — R73.03 PRE-DIABETES: ICD-10-CM

## 2024-11-18 NOTE — NURSING NOTE
Current patient location: At another dr hoffman w/wife    Is the patient currently in the state of [place]? YES    Visit mode:VIDEO    If the visit is dropped, the patient can be reconnected by:VIDEO VISIT: Text to cell phone:   Telephone Information:   Mobile [phone]       Will anyone else be joining the visit? NO  (If patient encounters technical issues they should call [phone])    Are changes needed to the allergy or medication list? No and Pt stated no med changes    Are refills needed on medications prescribed by this physician? NO    Rooming Documentation:  Not applicable    Reason for visit: ELISE EISENBERGF

## 2024-11-18 NOTE — PROGRESS NOTES
"Virtual Visit Details    Type of service:  Video Visit     Originating Location (pt. Location): {video visit patient location:609567::\"Home\"}  {PROVIDER LOCATION On-site should be selected for visits conducted from your clinic location or adjoining [facility] hospital, academic office, or other nearby [facility] building. Off-site should be selected for all other provider locations, including home:231268}  Distant Location (provider location):  {virtual location provider:175873}  Platform used for Video Visit: {Virtual Visit Platforms:821706::\"Tandem\"}    "

## 2024-11-18 NOTE — PATIENT INSTRUCTIONS
Have your lab tests  done Please contact us to schedule at any of our [place] lab locations  Call [phone] ([phone]), select option 1

## 2024-11-18 NOTE — LETTER
11/18/2024       RE: Natalie Ny  [address]  [place] [address]     Dear Colleague,    Thank you for referring your patient, Natalie Ny, to the [facility] [place] at [facility]. Please see a copy of my visit note below.    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  He is status post radioactive iodine ablation in 2007, and has been on levothyroxine replacement ever since.  He is currently taking 200 mcg each day.  He does admit that he sometimes misses the dose.  When that happens, he doubles up the next day.    He noted no change in his weight, palpitations, shortness of breath, or temperature tolerance.  Overall he feels well.      He was noted to have prediabetes based on A1c a couple of years ago he changed his diet and lost some weight that is now stabilized.  He continues to use the continuous glucose monitor that he finds very helpful.  We have no data to review today but he thinks his calculated A1c is in the 6s.  His primary care doctor checked his cholesterol and told him it was a little bit high.  He is finding it challenging to avoid all high fat foods.  He is not against taking a lipid lowering drug if necessary.    He has no paresthesias.  He has no foot ulcers.    Current Outpatient Medications   Medication Sig Dispense Refill     albuterol (VENTOLIN HFA) 108 (90 Base) MCG/ACT inhaler Inhale 2 puffs into the lungs every 6 hours as needed for shortness of breath 18 g 3     Continuous Blood Gluc  (FREESTYLE JAMES 2 READER) VENESSA 1 each See Admin Instructions Use per 's instructions for continuous glucose monitoring. 1 each 1     Continuous Blood Gluc Sensor (FREESTYLE JAMES 2 SENSOR) MISC 1 each See Admin Instructions Change every 14 days. 6 each 3     Continuous Blood Gluc Sensor (FREESTYLE JAMES 3 SENSOR) MISC 1 each every 14 days 6 each 3     esomeprazole (NEXIUM) 20 MG  "DR capsule Take 20 mg by mouth daily       famotidine (PEPCID) 20 MG tablet Take 20 mg by mouth daily       finasteride (PROPECIA) 1 MG tablet Take 1 tablet (1 mg) by mouth daily 90 tablet 3     fluticasone-vilanterol (BREO ELLIPTA) 100-25 MCG/ACT inhaler Inhale 1 puff into the lungs daily 60 each 3     levothyroxine (SYNTHROID/LEVOTHROID) 200 MCG tablet Take 1 tablet daily on 6 days of week and 2 tablets on the 7th day of the week 96 tablet 3     nadolol (CORGARD) 20 MG tablet Take 1 tablet (20 mg) by mouth daily 90 tablet 0     No current facility-administered medications for this visit.                59  as of 6/13/2024       BP: 131/81 131/81  as of 6/13/2024     Resp: -- 20  as of 1/4/2024     SpO2: 96 % 96%  as of 6/13/2024     Body Mass Index:  None     Systolic (Patient Repo...: -- Not taken     Diastolic (Patient Rep...: -- Not taken     Height: -- 1.798 m (5' 10.79\")   as of 7/18/2022     Weight: 88.4 kg (194 lb 12.8 oz) 88.4 kg (194 lb 12.8 oz)  as of 6/13/2024     Body Surface Area:  None     On exam he is in no acute distress     Latest Ref Rng 5/5/2022  2:56 PM 1/4/2024  2:22 PM 6/13/2024  2:27 PM   ENDO THYROID LABS-P       TSH 0.40 - 4.00 mU/L 0.02 (L)      TSH 0.30 - 4.20 uIU/mL  21.02 (H)  2.31    Triiodothyronine (T3) 60 - 181 ng/dL      FREE T4 0.90 - 1.70 ng/dL 1.42  1.34        Recent Labs   Lab Test 06/13/24  1427 01/04/24  1422 07/25/22  0734 05/05/22  1456   A1C 6.2* 5.9*  --  6.4*   TSH 2.31 21.02*  --  0.02*   T4  --  1.34  --  1.42   * 177*   < > 168*   HDL 53 53   < > 50   TRIG 112 93   < > 74   CR 1.07 0.98  --  0.95    < > = values in this interval not displayed.       Assessment and plan:    1.radioiodine induced hypothyroidism.  I will check his TSH.    2.prediabetes.  I will check his A1c.  I reminded him of the importance of managing his risk factors.  He will try to limit the fat in his diet.  If his LDL remains up above 130 when he is 56, we may want to start a statin " at that time.  I note his blood pressure was normal earlier this year.    Follow-up with me in 1 year.    I spent 5 minutes on the video visit with the patient today (start time 9: 3 0 and end time 9: 3 5).  On the same day of visit I spent an additional 10 minutes reviewing his chart, reviewing and interpreting labs, ordering tests, and doing documentation.  The visit was done at my office away from clinic.    Jane Calvo MD      Again, thank you for allowing me to participate in the care of your patient.      Sincerely,    Jane Calvo MD

## 2024-12-31 ENCOUNTER — TELEPHONE (OUTPATIENT)
Dept: ENDOCRINOLOGY | Facility: CLINIC | Age: 54
End: 2024-12-31
Payer: COMMERCIAL

## 2024-12-31 NOTE — TELEPHONE ENCOUNTER
Patient Contacted for the patient to call back and schedule the following:    Appointment type: RETURN DIABETES  Provider: Jane Calvo MD  Return date: 11/2025  Specialty phone number: [phone]  Additional appointment(s) needed:   Additonal Notes: Pt stated he will contact for an appointment in his own time.

## 2025-01-17 ENCOUNTER — LAB (OUTPATIENT)
Dept: LAB | Facility: CLINIC | Age: 55
End: 2025-01-17
Payer: COMMERCIAL

## 2025-01-17 DIAGNOSIS — R73.03 PRE-DIABETES: ICD-10-CM

## 2025-01-17 DIAGNOSIS — E03.4 HYPOTHYROIDISM DUE TO ACQUIRED ATROPHY OF THYROID: ICD-10-CM

## 2025-01-17 DIAGNOSIS — R79.89 ELEVATED LIVER FUNCTION TESTS: ICD-10-CM

## 2025-01-17 LAB
ALBUMIN SERPL BCG-MCNC: 4.7 G/DL (ref 3.5–5.2)
ALP SERPL-CCNC: 33 U/L (ref 40–150)
ALT SERPL W P-5'-P-CCNC: 25 U/L (ref 0–70)
AST SERPL W P-5'-P-CCNC: 30 U/L (ref 0–45)
BILIRUB DIRECT SERPL-MCNC: <0.2 MG/DL (ref 0–0.3)
BILIRUB SERPL-MCNC: 0.9 MG/DL
EST. AVERAGE GLUCOSE BLD GHB EST-MCNC: 128 MG/DL
HBA1C MFR BLD: 6.1 %
PROT SERPL-MCNC: 7.9 G/DL (ref 6.4–8.3)
T4 FREE SERPL-MCNC: 1.27 NG/DL (ref 0.9–1.7)
TSH SERPL DL<=0.005 MIU/L-ACNC: 20.74 UIU/ML (ref 0.3–4.2)

## 2025-01-17 PROCEDURE — 84443 ASSAY THYROID STIM HORMONE: CPT

## 2025-01-17 PROCEDURE — 36415 COLL VENOUS BLD VENIPUNCTURE: CPT

## 2025-01-17 PROCEDURE — 83036 HEMOGLOBIN GLYCOSYLATED A1C: CPT

## 2025-01-17 PROCEDURE — 84439 ASSAY OF FREE THYROXINE: CPT

## 2025-01-17 PROCEDURE — 80076 HEPATIC FUNCTION PANEL: CPT

## 2025-01-28 ENCOUNTER — TELEPHONE (OUTPATIENT)
Dept: FAMILY MEDICINE | Facility: CLINIC | Age: 55
End: 2025-01-28
Payer: COMMERCIAL

## 2025-01-28 NOTE — TELEPHONE ENCOUNTER
Left Voicemail (1st Attempt) and Sent Mychart (1st Attempt) for the patient to call back and schedule the following:    Appointment type: [facility] Physical  Provider: Dr. Jed Knox  Return date: Approx. 6/13/25  Specialty phone number: [phone]    Additional Notes:

## 2025-01-30 ENCOUNTER — TELEPHONE (OUTPATIENT)
Dept: FAMILY MEDICINE | Facility: CLINIC | Age: 55
End: 2025-01-30
Payer: COMMERCIAL

## 2025-01-30 NOTE — TELEPHONE ENCOUNTER
Left Voicemail (1st Attempt) and Sent Mychart (1st Attempt) for the patient to call back and schedule the following:    Appointment type: [facility] Physical  Provider: Dr. Knox  Return date: 6/14/25  Specialty phone number: [phone]

## 2025-04-24 ENCOUNTER — MYC MEDICAL ADVICE (OUTPATIENT)
Dept: FAMILY MEDICINE | Facility: CLINIC | Age: 55
End: 2025-04-24
Payer: COMMERCIAL

## 2025-04-24 DIAGNOSIS — E78.00 HIGH BLOOD CHOLESTEROL: Primary | ICD-10-CM

## 2025-04-25 ENCOUNTER — LAB (OUTPATIENT)
Dept: LAB | Facility: CLINIC | Age: 55
End: 2025-04-25
Payer: COMMERCIAL

## 2025-04-25 DIAGNOSIS — E78.00 HIGH BLOOD CHOLESTEROL: ICD-10-CM

## 2025-04-25 DIAGNOSIS — E03.4 HYPOTHYROIDISM DUE TO ACQUIRED ATROPHY OF THYROID: ICD-10-CM

## 2025-04-25 LAB
ALBUMIN SERPL BCG-MCNC: 4.6 G/DL (ref 3.5–5.2)
ALP SERPL-CCNC: 33 U/L (ref 40–150)
ALT SERPL W P-5'-P-CCNC: 24 U/L (ref 0–70)
ANION GAP SERPL CALCULATED.3IONS-SCNC: 9 MMOL/L (ref 7–15)
AST SERPL W P-5'-P-CCNC: 28 U/L (ref 0–45)
BILIRUB SERPL-MCNC: 0.5 MG/DL
BUN SERPL-MCNC: 17.6 MG/DL (ref 6–20)
CALCIUM SERPL-MCNC: 9.4 MG/DL (ref 8.8–10.4)
CHLORIDE SERPL-SCNC: 95 MMOL/L (ref 98–107)
CHOLEST SERPL-MCNC: 248 MG/DL
CREAT SERPL-MCNC: 1.23 MG/DL (ref 0.67–1.17)
EGFRCR SERPLBLD CKD-EPI 2021: 70 ML/MIN/1.73M2
FASTING STATUS PATIENT QL REPORTED: YES
FASTING STATUS PATIENT QL REPORTED: YES
GLUCOSE SERPL-MCNC: 102 MG/DL (ref 70–99)
HCO3 SERPL-SCNC: 28 MMOL/L (ref 22–29)
HDLC SERPL-MCNC: 56 MG/DL
LDLC SERPL CALC-MCNC: 158 MG/DL
NONHDLC SERPL-MCNC: 192 MG/DL
POTASSIUM SERPL-SCNC: 3.9 MMOL/L (ref 3.4–5.3)
PROT SERPL-MCNC: 7.9 G/DL (ref 6.4–8.3)
SODIUM SERPL-SCNC: 132 MMOL/L (ref 135–145)
T4 FREE SERPL-MCNC: 1.33 NG/DL (ref 0.9–1.7)
TRIGL SERPL-MCNC: 169 MG/DL
TSH SERPL DL<=0.005 MIU/L-ACNC: 17.17 UIU/ML (ref 0.3–4.2)

## 2025-04-25 PROCEDURE — 84155 ASSAY OF PROTEIN SERUM: CPT

## 2025-04-25 PROCEDURE — 84439 ASSAY OF FREE THYROXINE: CPT

## 2025-04-25 PROCEDURE — 36415 COLL VENOUS BLD VENIPUNCTURE: CPT

## 2025-04-25 PROCEDURE — 84478 ASSAY OF TRIGLYCERIDES: CPT

## 2025-04-25 PROCEDURE — 84443 ASSAY THYROID STIM HORMONE: CPT

## 2025-05-06 ENCOUNTER — TELEPHONE (OUTPATIENT)
Dept: FAMILY MEDICINE | Facility: CLINIC | Age: 55
End: 2025-05-06
Payer: COMMERCIAL

## 2025-05-06 NOTE — TELEPHONE ENCOUNTER
Left Voicemail (1st Attempt) and Sent Mychart (1st Attempt) for the patient to call back and schedule the following:    Appointment type: Annual  Provider: Dr. Knox  Return date: 6/20/25  Specialty phone number: [phone]  Additional appointment(s) needed: -  Additonal Notes: Due to change in provider schedule Dr. Knox is not available for your scheduled 6/20 visit. Please call to reschedule

## 2025-06-30 ENCOUNTER — PATIENT OUTREACH (OUTPATIENT)
Dept: CARE COORDINATION | Facility: CLINIC | Age: 55
End: 2025-06-30
Payer: COMMERCIAL

## 2025-07-02 ENCOUNTER — PATIENT OUTREACH (OUTPATIENT)
Dept: CARE COORDINATION | Facility: CLINIC | Age: 55
End: 2025-07-02
Payer: COMMERCIAL

## 2025-07-09 ENCOUNTER — TELEPHONE (OUTPATIENT)
Dept: GASTROENTEROLOGY | Facility: CLINIC | Age: 55
End: 2025-07-09
Payer: COMMERCIAL

## 2025-07-09 NOTE — TELEPHONE ENCOUNTER
"Endoscopy Scheduling Screen    Caller: patient    Have you had any respiratory illness or flu-like symptoms in the last 10 days?  No    Patient is ACTIVE on HipChat.  Inform patient that all appointment instructions will be sent via HipChat.    Review patient's insurance for any non participating payor.    Ordering/Referring Provider: Jed Knox MD   (If ordering provider performs procedure, schedule with ordering provider unless otherwise instructed. )    BMI: Estimated body mass index is 28.27 kg/m  as calculated from the following:    Height as of 6/27/25: 1.798 m (5' 10.79\").    Weight as of 6/27/25: 91.4 kg (201 lb 8 oz).     Sedation Ordered  moderate sedation.   If patient BMI > 50 do not schedule in [facility].    If patient BMI > 45 do not schedule at [facility].    Are you taking methadone or Suboxone?  NO, No RN review required.    Have you been diagnosed and are being treated for severe PTSD or severe anxiety?  NO, No RN review required.    Are you taking any prescription medications for pain 3 or more times per week?   NO, No RN review required.    Do you have a history of malignant hyperthermia?  No    (Females) Are you currently pregnant?   NA     Have you been diagnosed or told you have pulmonary hypertension?   No    Do you have an LVAD?  No    Have you been told you have moderate to severe sleep apnea?  No.    Have you been told you have COPD, asthma, or any other lung disease?  No    Has your doctor ordered any cardiac tests like echo, angiogram, stress test, ablation, or EKG, that you have not completed yet?  No    Do you  have a history of any heart conditions?  No     Have you ever had or are you waiting for an organ transplant?  No. Continue scheduling, no site restrictions.    Have you had a stroke or transient ischemic attack (TIA aka \"mini stroke\") in the last 2 years?   No.    Have you been diagnosed with or been told you have cirrhosis of the liver?   No.    Are you currently on " "dialysis?   No    Do you need assistance transferring?   No    BMI: Estimated body mass index is 28.27 kg/m  as calculated from the following:    Height as of 6/27/25: 1.798 m (5' 10.79\").    Weight as of 6/27/25: 91.4 kg (201 lb 8 oz).     Is patients BMI > 40 and scheduling location UP?  No    Do you take an injectable or oral medication for weight loss or diabetes (excluding insulin)?  No    Do you take the medication Naltrexone?  No    Do you take blood thinners?  No       Prep   Are you currently on dialysis or do you have chronic kidney disease?  No    Do you have a diagnosis of diabetes?  No    Do you have a diagnosis of cystic fibrosis (CF)?  No    On a regular basis do you go 3 -5 days between bowel movements?  No    BMI > 40?  No    Preferred Pharmacy:    "[id number] [place], [place] - [address] AT [place] [address]  [address]  [facility] [address]  Phone: [phone] Fax: [phone]      Final Scheduling Details     Procedure scheduled  Colonoscopy / Upper endoscopy (EGD)    Surgeon:  Cadence     Date of procedure:  8.31.25     Pre-OP / PAC:   No - Not required for this site.    Location  [facility] - Patient preference.    Sedation   Moderate Sedation - Per order.      Patient Reminders:   You will receive a call from a Nurse to review instructions and health history.  This assessment must be completed prior to your procedure.  Failure to complete the Nurse assessment may result in the procedure being cancelled.      On the day of your procedure, please designate an adult(s) who can drive you home stay with you for the next 24 hours. The medicines used in the exam will make you sleepy. You will not be able to drive.      You cannot take public transportation, ride share services, or non-medical taxi service without a responsible caregiver.  Medical transport services are allowed with the requirement that a responsible caregiver will receive you at your " destination.  We require that drivers and caregivers are confirmed prior to your procedure.     Detail Level: Detailed

## 2025-07-10 ENCOUNTER — TELEPHONE (OUTPATIENT)
Dept: GASTROENTEROLOGY | Facility: CLINIC | Age: 55
End: 2025-07-10
Payer: COMMERCIAL

## 2025-07-10 NOTE — TELEPHONE ENCOUNTER
Pre assessment completed for upcoming procedure.   (Please see previous telephone encounter notes for complete details)    Patient returned call.       Procedure details:    Procedure date 7/31/25, arrival time 0930 and facility location reviewed.    Pre op exam needed? No.    Designated  policy reviewed. Instructed to have someone stay 6  hours post procedure.       Medication review:    Medications reviewed. Please see supporting documentation below. Holding recommendations discussed (if applicable).       Prep for procedure:     Patient declined to review procedure prep instructions on the phone. Instructed patient to review the procedure prep instructions at least 7 days prior to procedure due to necessary dietary modifications. NPO instructions reviewed.    Patient was instructed to call if any questions or concerns arise.          Any additional information needed:  N/A      Patient verbalized understanding and had no questions or concerns at this time.      Marily Beltran RN  Endoscopy Procedure Pre Assessment   [phone] option 3

## 2025-07-10 NOTE — TELEPHONE ENCOUNTER
Attempted to contact patient in order to complete pre assessment questions.     No answer. Unable to leave a voicemail. Phone was picked up and then disconnected x 2    Callback communication sent via [employer].    Rita Huertas LPN

## 2025-07-10 NOTE — TELEPHONE ENCOUNTER
Pre visit planning completed.      Procedure details:    Patient scheduled for Colonoscopy/Upper endoscopy (EGD) on 7/31/25.     Approximate arrival time: 0930. Procedure time 1015.   *Ensure patient is aware that endoscopy team will be calling about 2 days prior to procedure date to confirm arrival time as this may change.     Facility location: [facility]; [address], [place] [address]. Check in location: 2nd Floor at Surgery desk.  *Disclaimer: Drivers are to check in with patient and stay on campus during procedure.     Sedation type: Conscious sedation     Pre op exam needed? No.    Indication for procedure:   Diagnosis   Screen for colon cancer   Chronic GERD         Chart review:     Electronic implanted devices? No    Recent diagnosis of diverticulitis within the last 6 weeks? No      Medication review:    Diabetic? Prediabetic. Not on diabetic medications.    Anticoagulants? No    Weight loss medication/injectable? No GLP-1 medication per patient's medication list. Nursing to verify with pre-assessment call.    Other medication HOLDING recommendations:  N/A      Prep for procedure:     Bowel prep recommendation: Standard Miralax.   Due to: standard bowel prep    Procedure information and instructions sent via [employer]         Bell Anderson RN  Endoscopy Procedure Pre Assessment   [phone] option 3

## 2025-07-13 NOTE — PROGRESS NOTES
[facility] Dermatology Note    Encounter Date: Jul 14, 2025    Dermatology Problem List:  1. Onychomycosis vs traumatic changes to the toenails  - left great toe, s/p nail clipping 8/7/19  - terbinafine 250 mg po every day x 12 weeks initiated 7/29/20, reinitiated 11/14/22  - baseline LFTs ordered 7/29/20    ______________________________________    Impression/Plan:  1. Onychomycosis: recurrence following completion of prior terbinafine course. We discussed risks/benefits of next steps in treatment and will restart systemic terbinafine.  - terbinafine 250 mg daily      Follow-up in 1 year.       Staff Involved:  Staff/Scribe    Scribe Disclosure:   By signing my name below, I, Carri Rodriguez, attest that this document has been prepared under the direction and in the presence of Javad Moser MD      Electronically signed by: Carri Rodriguez 7/14/25    Provider Disclosure:   The documentation recorded by the scribe accurately reflects the services I personally performed and the decisions made by me.    Javad Moser MD   of Dermatology  Department of Dermatology  [facility]        CC:   Chief Complaint   Patient presents with    Skin Check     Full body skin check. No spots of concern. No personal history of skin cancer.    Derm Problem     Onychomycosis.Left great toe. Patient used prescribed terbinafine this past winter and would like nail rechecked.       History of Present Illness:  Mr. Natalie Ny is a 54 year old male who presents as a return patient.    Onychomycosis - prescribed oral terbinafine but didn't start  - tried topical - some improvement  - started oral terbinafine in January  - then start topical in addition about 1 month into treatment  - base of nail is clearing except on medial side    Labs:  4/2025 CMP reviewed    Physical exam:  Vitals: There were no vitals taken for this visit.  GEN: This is a well developed,  well-nourished male in no acute distress, in a pleasant mood.    SKIN: Carpio phototype II  - Full skin, which includes the head/face, both arms, chest, back, abdomen,both legs, genitalia and/or groin buttocks, digits and/or nails, was examined.  - hyperkeratosis and subungual debris affecting the great toenail  - There are dome shaped bright red papules on the head/neck, trunk, extremities.   - Multiple regular brown pigmented macules and papules are identified on the head/neck, trunk, extremities.   - Scattered brown macules on sun exposed areas.  - There are waxy stuck on tan to brown papules on the head/neck, trunk, extremities.  - No other lesions of concern on areas examined.     Past Medical History:   No past medical history on file.  No past surgical history on file.    Social History:   reports that he has never smoked. He has never used smokeless tobacco.    Family History:  No family history on file.    Medications:  Current Outpatient Medications   Medication Sig Dispense Refill    albuterol (VENTOLIN HFA) 108 (90 Base) MCG/ACT inhaler Inhale 2 puffs into the lungs every 6 hours as needed for shortness of breath 18 g 3    Continuous Glucose Sensor (FREESTYLE JAMES 3 SENSOR) MISC 1 each every 14 days. 6 each 3    esomeprazole (NEXIUM) 20 MG DR capsule Take 20 mg by mouth daily      famotidine (PEPCID) 20 MG tablet Take 20 mg by mouth daily      finasteride (PROPECIA) 1 MG tablet Take 1 tablet (1 mg) by mouth daily. 90 tablet 3    levothyroxine (SYNTHROID/LEVOTHROID) 200 MCG tablet Take 1 tablet daily on 6 days of week and 2 tablets on the 7th day of the week 96 tablet 3    nadolol (CORGARD) 20 MG tablet Take 1 tablet (20 mg) by mouth daily. 90 tablet 0    Continuous Blood Gluc  (FREESTYLE JAEMS 2 READER) VENESSA 1 each See Admin Instructions Use per 's instructions for continuous glucose monitoring. (Patient not taking: Reported on 7/14/2025) 1 each 1    Continuous Blood Gluc Sensor  (FREESTYLE JAMES 2 SENSOR) MISC 1 each See Admin Instructions Change every 14 days. (Patient not taking: Reported on 7/14/2025) 6 each 3    terbinafine (LAMISIL) 250 MG tablet Take 1 tablet (250 mg) by mouth daily. (Patient not taking: Reported on 7/14/2025) 90 tablet 0     Allergies   Allergen Reactions    Amoxicillin Rash    Penicillins Rash

## 2025-07-14 ENCOUNTER — OFFICE VISIT (OUTPATIENT)
Dept: DERMATOLOGY | Facility: CLINIC | Age: 55
End: 2025-07-14
Attending: FAMILY MEDICINE
Payer: COMMERCIAL

## 2025-07-14 DIAGNOSIS — B35.1 ONYCHOMYCOSIS: Primary | ICD-10-CM

## 2025-07-14 DIAGNOSIS — D23.9 ANGIOKERATOMA: ICD-10-CM

## 2025-07-14 DIAGNOSIS — D18.01 CHERRY ANGIOMA: ICD-10-CM

## 2025-07-14 DIAGNOSIS — L81.4 SOLAR LENTIGO: ICD-10-CM

## 2025-07-14 DIAGNOSIS — D22.9 MULTIPLE MELANOCYTIC NEVI: ICD-10-CM

## 2025-07-14 DIAGNOSIS — L82.1 SEBORRHEIC KERATOSIS: ICD-10-CM

## 2025-07-14 RX ORDER — TERBINAFINE HYDROCHLORIDE 250 MG/1
250 TABLET ORAL DAILY
Qty: 90 TABLET | Refills: 0 | Status: SHIPPED | OUTPATIENT
Start: 2025-07-14

## 2025-07-14 NOTE — LETTER
7/14/2025      Natalie Ny  [address]  [place] [address]      Dear Colleague,    Thank you for referring your patient, Natalie Ny, to the [facility]. Please see a copy of my visit note below.    [facility] Dermatology Note    Encounter Date: Jul 14, 2025    Dermatology Problem List:  1. Onychomycosis vs traumatic changes to the toenails  - left great toe, s/p nail clipping 8/7/19  - terbinafine 250 mg po every day x 12 weeks initiated 7/29/20, reinitiated 11/14/22  - baseline LFTs ordered 7/29/20    ______________________________________    Impression/Plan:  1. Onychomycosis: recurrence following completion of prior terbinafine course. We discussed risks/benefits of next steps in treatment and will restart systemic terbinafine.  - terbinafine 250 mg daily      Follow-up in 1 year.       Staff Involved:  Staff/Scribe    Scribe Disclosure:   By signing my name below, I, Carri Jennifer, attest that this document has been prepared under the direction and in the presence of Javad Moser MD      Electronically signed by: Carri Rodriguez 7/14/25    Provider Disclosure:   The documentation recorded by the scribe accurately reflects the services I personally performed and the decisions made by me.    Javad Moser MD   of Dermatology  Department of Dermatology  [facility]        CC:   Chief Complaint   Patient presents with     Skin Check     Full body skin check. No spots of concern. No personal history of skin cancer.     Derm Problem     Onychomycosis.Left great toe. Patient used prescribed terbinafine this past winter and would like nail rechecked.       History of Present Illness:  Mr. Natalie Ny is a 54 year old male who presents as a return patient.    Onychomycosis - prescribed oral terbinafine but didn't start  - tried topical - some improvement  - started oral terbinafine in January  -  then start topical in addition about 1 month into treatment  - base of nail is clearing except on medial side    Labs:  4/2025 [facility] reviewed    Physical exam:  Vitals: There were no vitals taken for this visit.  GEN: This is a well developed, well-nourished male in no acute distress, in a pleasant mood.    SKIN: Carpio phototype II  - Full skin, which includes the head/face, both arms, chest, back, abdomen,both legs, genitalia and/or groin buttocks, digits and/or nails, was examined.  - hyperkeratosis and subungual debris affecting the great toenail  - There are dome shaped bright red papules on the head/neck, trunk, extremities.   - Multiple regular brown pigmented macules and papules are identified on the head/neck, trunk, extremities.   - Scattered brown macules on sun exposed areas.  - There are waxy stuck on tan to brown papules on the head/neck, trunk, extremities.  - No other lesions of concern on areas examined.     Past Medical History:   No past medical history on file.  No past surgical history on file.    Social History:   reports that he has never smoked. He has never used smokeless tobacco.    Family History:  No family history on file.    Medications:  Current Outpatient Medications   Medication Sig Dispense Refill     albuterol (VENTOLIN HFA) 108 (90 Base) MCG/ACT inhaler Inhale 2 puffs into the lungs every 6 hours as needed for shortness of breath 18 g 3     Continuous Glucose Sensor (FREESTYLE JAMES 3 SENSOR) MISC 1 each every 14 days. 6 each 3     esomeprazole (NEXIUM) 20 MG DR capsule Take 20 mg by mouth daily       famotidine (PEPCID) 20 MG tablet Take 20 mg by mouth daily       finasteride (PROPECIA) 1 MG tablet Take 1 tablet (1 mg) by mouth daily. 90 tablet 3     levothyroxine (SYNTHROID/LEVOTHROID) 200 MCG tablet Take 1 tablet daily on 6 days of week and 2 tablets on the 7th day of the week 96 tablet 3     nadolol (CORGARD) 20 MG tablet Take 1 tablet (20 mg) by mouth daily. 90 tablet 0      Continuous Blood Gluc  (FREESTYLE JAMES 2 READER) VENESSA 1 each See Admin Instructions Use per 's instructions for continuous glucose monitoring. (Patient not taking: Reported on 7/14/2025) 1 each 1     Continuous Blood Gluc Sensor (FREESTYLE JAMES 2 SENSOR) MISC 1 each See Admin Instructions Change every 14 days. (Patient not taking: Reported on 7/14/2025) 6 each 3     terbinafine (LAMISIL) 250 MG tablet Take 1 tablet (250 mg) by mouth daily. (Patient not taking: Reported on 7/14/2025) 90 tablet 0     Allergies   Allergen Reactions     Amoxicillin Rash     Penicillins Rash       Again, thank you for allowing me to participate in the care of your patient.        Sincerely,        Javad Moser MD    Electronically signed

## 2025-07-14 NOTE — NURSING NOTE
Natalie Ny's goals for this visit include:   Chief Complaint   Patient presents with    Skin Check     Full body skin check. No spots of concern. No personal history of skin cancer.    Derm Problem     Onychomycosis.Left great toe. Patient used prescribed terbinafine this past winter and would like nail rechecked.       He requests these members of his care team be copied on today's visit information:     PCP: Jed Knox    Referring Provider:  Jed Knox MD  [address] [place] [id number]    There were no vitals taken for this visit.    Do you need any medication refills at today's visit? No  Gilma Gilmore CMA

## 2025-07-29 ENCOUNTER — TELEPHONE (OUTPATIENT)
Dept: GASTROENTEROLOGY | Facility: CLINIC | Age: 55
End: 2025-07-29
Payer: COMMERCIAL

## 2025-07-29 NOTE — TELEPHONE ENCOUNTER
Left voicemail of arrival time of 9:30 AM.     S[employer] message sent with updated arrival time.

## 2025-08-30 ENCOUNTER — MYC MEDICAL ADVICE (OUTPATIENT)
Dept: FAMILY MEDICINE | Facility: CLINIC | Age: 55
End: 2025-08-30
Payer: COMMERCIAL

## 2025-08-30 DIAGNOSIS — R73.03 PREDIABETES: ICD-10-CM

## 2025-08-30 DIAGNOSIS — E78.00 HIGH BLOOD CHOLESTEROL: Primary | ICD-10-CM

## 2025-09-02 ENCOUNTER — TELEPHONE (OUTPATIENT)
Dept: GASTROENTEROLOGY | Facility: CLINIC | Age: 55
End: 2025-09-02
Payer: COMMERCIAL

## (undated) RX ORDER — ALBUTEROL SULFATE 0.83 MG/ML
SOLUTION RESPIRATORY (INHALATION)
Status: DISPENSED
Start: 2017-03-01